# Patient Record
Sex: MALE | Race: WHITE | Employment: OTHER | URBAN - NONMETROPOLITAN AREA
[De-identification: names, ages, dates, MRNs, and addresses within clinical notes are randomized per-mention and may not be internally consistent; named-entity substitution may affect disease eponyms.]

---

## 2017-07-20 ENCOUNTER — OFFICE VISIT (OUTPATIENT)
Dept: PRIMARY CARE CLINIC | Age: 62
End: 2017-07-20
Payer: COMMERCIAL

## 2017-07-20 VITALS
BODY MASS INDEX: 23.7 KG/M2 | OXYGEN SATURATION: 97 % | WEIGHT: 175 LBS | SYSTOLIC BLOOD PRESSURE: 138 MMHG | DIASTOLIC BLOOD PRESSURE: 76 MMHG | HEIGHT: 72 IN | HEART RATE: 71 BPM | TEMPERATURE: 97.9 F

## 2017-07-20 DIAGNOSIS — L57.0 AK (ACTINIC KERATOSIS): ICD-10-CM

## 2017-07-20 DIAGNOSIS — J40 BRONCHITIS: ICD-10-CM

## 2017-07-20 DIAGNOSIS — R06.2 WHEEZING: ICD-10-CM

## 2017-07-20 DIAGNOSIS — J06.9 UPPER RESPIRATORY TRACT INFECTION, UNSPECIFIED TYPE: Primary | ICD-10-CM

## 2017-07-20 PROCEDURE — 99203 OFFICE O/P NEW LOW 30 MIN: CPT | Performed by: NURSE PRACTITIONER

## 2017-07-20 RX ORDER — CEFDINIR 300 MG/1
300 CAPSULE ORAL 2 TIMES DAILY
Qty: 20 CAPSULE | Refills: 0 | Status: SHIPPED | OUTPATIENT
Start: 2017-07-20 | End: 2017-07-30

## 2017-07-20 RX ORDER — BUTALBITAL, ACETAMINOPHEN AND CAFFEINE 50; 325; 40 MG/1; MG/1; MG/1
1 TABLET ORAL EVERY 6 HOURS PRN
Qty: 60 TABLET | Refills: 1 | Status: SHIPPED | OUTPATIENT
Start: 2017-07-20 | End: 2018-07-30 | Stop reason: SDUPTHER

## 2017-07-20 RX ORDER — METHYLPREDNISOLONE 4 MG/1
TABLET ORAL
Qty: 1 KIT | Refills: 0 | Status: SHIPPED | OUTPATIENT
Start: 2017-07-20 | End: 2018-07-30

## 2017-07-20 RX ORDER — LORATADINE 10 MG/1
10 TABLET ORAL DAILY
Qty: 30 TABLET | Refills: 5 | Status: SHIPPED | OUTPATIENT
Start: 2017-07-20 | End: 2018-07-30

## 2017-07-20 RX ORDER — OMEPRAZOLE 40 MG/1
40 CAPSULE, DELAYED RELEASE ORAL DAILY
Qty: 30 CAPSULE | Refills: 5 | Status: SHIPPED | OUTPATIENT
Start: 2017-07-20 | End: 2018-07-30 | Stop reason: SDUPTHER

## 2017-07-20 RX ORDER — ASPIRIN/CALCIUM/MAG/ALUMINUM 325 MG
325 TABLET ORAL DAILY
COMMUNITY
End: 2022-04-29

## 2017-07-20 RX ORDER — FLUTICASONE FUROATE AND VILANTEROL 200; 25 UG/1; UG/1
1 POWDER RESPIRATORY (INHALATION) DAILY
Qty: 1 EACH | Refills: 0
Start: 2017-07-20 | End: 2018-07-30

## 2017-07-20 ASSESSMENT — ENCOUNTER SYMPTOMS
SORE THROAT: 0
DIARRHEA: 0
WHEEZING: 1
EYE REDNESS: 0
EYE DISCHARGE: 0
BLOOD IN STOOL: 0
COUGH: 1
CONSTIPATION: 0
RHINORRHEA: 1
CHOKING: 0

## 2017-08-28 ENCOUNTER — HOSPITAL ENCOUNTER (EMERGENCY)
Age: 62
Discharge: HOME | End: 2017-08-28
Payer: COMMERCIAL

## 2017-08-28 VITALS
HEIGHT: 72 IN | OXYGEN SATURATION: 97 % | DIASTOLIC BLOOD PRESSURE: 99 MMHG | WEIGHT: 175 LBS | SYSTOLIC BLOOD PRESSURE: 161 MMHG | TEMPERATURE: 98.6 F | BODY MASS INDEX: 23.7 KG/M2 | HEART RATE: 88 BPM | RESPIRATION RATE: 20 BRPM

## 2017-08-28 DIAGNOSIS — S61.213A LACERATION OF LEFT MIDDLE FINGER W/O FOREIGN BODY W/O DAMAGE TO NAIL, INITIAL ENCOUNTER: Primary | ICD-10-CM

## 2017-08-28 PROCEDURE — 99282 EMERGENCY DEPT VISIT SF MDM: CPT | Performed by: NURSE PRACTITIONER

## 2017-08-28 PROCEDURE — 6360000002 HC RX W HCPCS: Performed by: NURSE PRACTITIONER

## 2017-08-28 PROCEDURE — 99282 EMERGENCY DEPT VISIT SF MDM: CPT

## 2017-08-28 PROCEDURE — 90715 TDAP VACCINE 7 YRS/> IM: CPT | Performed by: NURSE PRACTITIONER

## 2017-08-28 PROCEDURE — 90471 IMMUNIZATION ADMIN: CPT | Performed by: NURSE PRACTITIONER

## 2017-08-28 RX ORDER — CEPHALEXIN 500 MG/1
500 CAPSULE ORAL 4 TIMES DAILY
Qty: 28 CAPSULE | Refills: 0 | Status: SHIPPED | OUTPATIENT
Start: 2017-08-28 | End: 2018-07-30

## 2017-08-28 RX ADMIN — TETANUS TOXOID, REDUCED DIPHTHERIA TOXOID AND ACELLULAR PERTUSSIS VACCINE, ADSORBED 0.5 ML: 5; 2.5; 8; 8; 2.5 SUSPENSION INTRAMUSCULAR at 13:43

## 2018-07-30 ENCOUNTER — TELEPHONE (OUTPATIENT)
Dept: PRIMARY CARE CLINIC | Age: 63
End: 2018-07-30

## 2018-07-30 ENCOUNTER — OFFICE VISIT (OUTPATIENT)
Dept: PRIMARY CARE CLINIC | Age: 63
End: 2018-07-30
Payer: COMMERCIAL

## 2018-07-30 VITALS
TEMPERATURE: 97.5 F | BODY MASS INDEX: 23.16 KG/M2 | HEART RATE: 64 BPM | DIASTOLIC BLOOD PRESSURE: 88 MMHG | WEIGHT: 171 LBS | SYSTOLIC BLOOD PRESSURE: 138 MMHG | HEIGHT: 72 IN | OXYGEN SATURATION: 98 %

## 2018-07-30 DIAGNOSIS — G44.229 CHRONIC TENSION-TYPE HEADACHE, NOT INTRACTABLE: ICD-10-CM

## 2018-07-30 DIAGNOSIS — L57.0 AK (ACTINIC KERATOSIS): ICD-10-CM

## 2018-07-30 DIAGNOSIS — F51.01 PRIMARY INSOMNIA: Primary | ICD-10-CM

## 2018-07-30 DIAGNOSIS — K02.9 DENTAL CARIES: ICD-10-CM

## 2018-07-30 PROCEDURE — G8428 CUR MEDS NOT DOCUMENT: HCPCS | Performed by: NURSE PRACTITIONER

## 2018-07-30 PROCEDURE — 17003 DESTRUCT PREMALG LES 2-14: CPT | Performed by: NURSE PRACTITIONER

## 2018-07-30 PROCEDURE — 4004F PT TOBACCO SCREEN RCVD TLK: CPT | Performed by: NURSE PRACTITIONER

## 2018-07-30 PROCEDURE — 3017F COLORECTAL CA SCREEN DOC REV: CPT | Performed by: NURSE PRACTITIONER

## 2018-07-30 PROCEDURE — G8420 CALC BMI NORM PARAMETERS: HCPCS | Performed by: NURSE PRACTITIONER

## 2018-07-30 PROCEDURE — 17000 DESTRUCT PREMALG LESION: CPT | Performed by: NURSE PRACTITIONER

## 2018-07-30 PROCEDURE — 99214 OFFICE O/P EST MOD 30 MIN: CPT | Performed by: NURSE PRACTITIONER

## 2018-07-30 RX ORDER — CLONAZEPAM 1 MG/1
1 TABLET ORAL 2 TIMES DAILY PRN
Qty: 60 TABLET | Refills: 0 | Status: SHIPPED | OUTPATIENT
Start: 2018-07-30 | End: 2018-10-30 | Stop reason: SDUPTHER

## 2018-07-30 RX ORDER — PENICILLIN V POTASSIUM 500 MG/1
500 TABLET ORAL 2 TIMES DAILY
Qty: 20 TABLET | Refills: 0 | Status: SHIPPED | OUTPATIENT
Start: 2018-07-30 | End: 2019-05-24 | Stop reason: SDUPTHER

## 2018-07-30 RX ORDER — BUTALBITAL, ACETAMINOPHEN AND CAFFEINE 50; 325; 40 MG/1; MG/1; MG/1
1-2 TABLET ORAL EVERY 8 HOURS PRN
Qty: 90 TABLET | Refills: 2 | Status: SHIPPED | OUTPATIENT
Start: 2018-07-30 | End: 2018-10-30 | Stop reason: SDUPTHER

## 2018-07-30 RX ORDER — OMEPRAZOLE 40 MG/1
40 CAPSULE, DELAYED RELEASE ORAL DAILY
Qty: 30 CAPSULE | Refills: 5 | Status: SHIPPED | OUTPATIENT
Start: 2018-07-30 | End: 2018-08-31 | Stop reason: SDUPTHER

## 2018-07-30 ASSESSMENT — ENCOUNTER SYMPTOMS
DIARRHEA: 0
CHOKING: 0
EYE REDNESS: 0
COUGH: 0
ROS SKIN COMMENTS: AK
WHEEZING: 0
RHINORRHEA: 0
CONSTIPATION: 0
BLOOD IN STOOL: 0
SORE THROAT: 0
EYE DISCHARGE: 0

## 2018-07-30 NOTE — PROGRESS NOTES
tablet 0    Aspirin Buf,YuMqn-JdNvs-ThQyo, (BUFFERED ASPIRIN) 325 MG TABS Take 325 mg by mouth daily       No current facility-administered medications for this visit. No Known Allergies    Health Maintenance   Topic Date Due    Hepatitis C screen  1955    HIV screen  06/07/1970    Lipid screen  06/07/1995    Shingles Vaccine (1 of 2 - 2 Dose Series) 06/07/2005    Pneumococcal med risk (1 of 1 - PPSV23) 07/30/2019 (Originally 6/7/1974)    Colon cancer screen colonoscopy  07/30/2019 (Originally 6/7/2005)    Flu vaccine (1) 09/01/2018    DTaP/Tdap/Td vaccine (2 - Td) 08/28/2027        Subjective:      Review of Systems   Constitutional: Negative for appetite change and unexpected weight change. HENT: Positive for dental problem. Negative for congestion, ear pain, rhinorrhea and sore throat. Eyes: Negative for discharge and redness. Respiratory: Negative for cough, choking and wheezing. Cardiovascular: Negative for chest pain. Gastrointestinal: Negative for blood in stool, constipation and diarrhea. Genitourinary: Negative for decreased urine volume and dysuria. Skin: Negative for rash. AK   Neurological: Positive for headaches. Negative for weakness. Hematological: Negative for adenopathy. Psychiatric/Behavioral: Negative for suicidal ideas. Objective:     Physical Exam   Constitutional: He is oriented to person, place, and time. He appears well-developed and well-nourished. HENT:   Head: Normocephalic. Nose: Nasal deformity (septum removed, wears bandage over nose) present. Mouth/Throat: Abnormal dentition. Dental caries present. Eyes: Conjunctivae are normal.   Neck: Normal range of motion. Neck supple. Cardiovascular: Normal rate, regular rhythm and normal heart sounds. Pulmonary/Chest: Effort normal and breath sounds normal. He has no wheezes. He has no rales. Abdominal: Soft. Bowel sounds are normal. There is no tenderness.    Musculoskeletal: He

## 2018-08-31 RX ORDER — OMEPRAZOLE 40 MG/1
40 CAPSULE, DELAYED RELEASE ORAL DAILY
Qty: 30 CAPSULE | Refills: 5 | Status: SHIPPED | OUTPATIENT
Start: 2018-08-31 | End: 2019-05-24 | Stop reason: SDUPTHER

## 2018-10-30 ENCOUNTER — OFFICE VISIT (OUTPATIENT)
Dept: PRIMARY CARE CLINIC | Age: 63
End: 2018-10-30
Payer: COMMERCIAL

## 2018-10-30 VITALS
WEIGHT: 170 LBS | OXYGEN SATURATION: 97 % | BODY MASS INDEX: 23.03 KG/M2 | HEART RATE: 102 BPM | SYSTOLIC BLOOD PRESSURE: 138 MMHG | HEIGHT: 72 IN | TEMPERATURE: 98.4 F | DIASTOLIC BLOOD PRESSURE: 86 MMHG

## 2018-10-30 DIAGNOSIS — F51.01 PRIMARY INSOMNIA: ICD-10-CM

## 2018-10-30 DIAGNOSIS — I10 ESSENTIAL HYPERTENSION: ICD-10-CM

## 2018-10-30 DIAGNOSIS — G44.229 CHRONIC TENSION-TYPE HEADACHE, NOT INTRACTABLE: ICD-10-CM

## 2018-10-30 DIAGNOSIS — F41.1 GAD (GENERALIZED ANXIETY DISORDER): Primary | ICD-10-CM

## 2018-10-30 PROCEDURE — G8484 FLU IMMUNIZE NO ADMIN: HCPCS | Performed by: NURSE PRACTITIONER

## 2018-10-30 PROCEDURE — G8427 DOCREV CUR MEDS BY ELIG CLIN: HCPCS | Performed by: NURSE PRACTITIONER

## 2018-10-30 PROCEDURE — 3017F COLORECTAL CA SCREEN DOC REV: CPT | Performed by: NURSE PRACTITIONER

## 2018-10-30 PROCEDURE — 4004F PT TOBACCO SCREEN RCVD TLK: CPT | Performed by: NURSE PRACTITIONER

## 2018-10-30 PROCEDURE — 99214 OFFICE O/P EST MOD 30 MIN: CPT | Performed by: NURSE PRACTITIONER

## 2018-10-30 PROCEDURE — G8420 CALC BMI NORM PARAMETERS: HCPCS | Performed by: NURSE PRACTITIONER

## 2018-10-30 RX ORDER — CLONAZEPAM 1 MG/1
1 TABLET ORAL 2 TIMES DAILY PRN
Qty: 60 TABLET | Refills: 0 | Status: SHIPPED | OUTPATIENT
Start: 2018-10-30 | End: 2018-11-02 | Stop reason: SDUPTHER

## 2018-10-30 RX ORDER — PROPRANOLOL HCL 60 MG
60 CAPSULE, EXTENDED RELEASE 24HR ORAL DAILY
Qty: 30 CAPSULE | Refills: 5 | Status: SHIPPED | OUTPATIENT
Start: 2018-10-30 | End: 2019-05-24 | Stop reason: ALTCHOICE

## 2018-10-30 RX ORDER — BUTALBITAL, ACETAMINOPHEN AND CAFFEINE 50; 325; 40 MG/1; MG/1; MG/1
1-2 TABLET ORAL EVERY 6 HOURS PRN
Qty: 120 TABLET | Refills: 0 | Status: SHIPPED | OUTPATIENT
Start: 2018-10-30 | End: 2019-01-22 | Stop reason: SDUPTHER

## 2018-10-30 ASSESSMENT — PATIENT HEALTH QUESTIONNAIRE - PHQ9
SUM OF ALL RESPONSES TO PHQ9 QUESTIONS 1 & 2: 0
SUM OF ALL RESPONSES TO PHQ QUESTIONS 1-9: 0
2. FEELING DOWN, DEPRESSED OR HOPELESS: 0
1. LITTLE INTEREST OR PLEASURE IN DOING THINGS: 0
SUM OF ALL RESPONSES TO PHQ QUESTIONS 1-9: 0

## 2018-10-30 NOTE — PROGRESS NOTES
Refill:  5         Discussed use, benefit, and side effectsof prescribed medications. All patient questions answered. Pt voiced understanding. Reviewed health maintenance. .  Patient agreed with treatment plan. Follow up asdirected. There are no Patient Instructions on file for this visit.       Electronically signed by CAPO Garcia on11/1/2018 at 5:09 PM

## 2018-11-01 ASSESSMENT — ENCOUNTER SYMPTOMS
WHEEZING: 0
ROS SKIN COMMENTS: AK
COUGH: 0
DIARRHEA: 0
BLOOD IN STOOL: 0
RHINORRHEA: 0
EYE DISCHARGE: 0
EYE REDNESS: 0
CONSTIPATION: 0
SORE THROAT: 0
CHOKING: 0

## 2018-11-02 DIAGNOSIS — F41.1 GAD (GENERALIZED ANXIETY DISORDER): ICD-10-CM

## 2018-11-02 DIAGNOSIS — F51.01 PRIMARY INSOMNIA: ICD-10-CM

## 2018-11-02 RX ORDER — CLONAZEPAM 1 MG/1
1 TABLET ORAL 2 TIMES DAILY PRN
Qty: 60 TABLET | Refills: 0 | Status: SHIPPED | OUTPATIENT
Start: 2018-11-02 | End: 2019-01-22 | Stop reason: SDUPTHER

## 2018-11-02 NOTE — TELEPHONE ENCOUNTER
[facility] does not have Klonopin 1mg right now. Wants to [facility] instead. Called [facility] and spoke with Shiva Ferguson and cancelled rx.

## 2019-01-22 ENCOUNTER — OFFICE VISIT (OUTPATIENT)
Dept: PRIMARY CARE CLINIC | Age: 64
End: 2019-01-22
Payer: COMMERCIAL

## 2019-01-22 VITALS
HEART RATE: 67 BPM | OXYGEN SATURATION: 97 % | TEMPERATURE: 97.4 F | DIASTOLIC BLOOD PRESSURE: 84 MMHG | HEIGHT: 72 IN | BODY MASS INDEX: 22.89 KG/M2 | WEIGHT: 169 LBS | SYSTOLIC BLOOD PRESSURE: 136 MMHG

## 2019-01-22 DIAGNOSIS — Z72.0 TOBACCO ABUSE: ICD-10-CM

## 2019-01-22 DIAGNOSIS — Z85.89 HX OF SQUAMOUS CELL CARCINOMA: Primary | ICD-10-CM

## 2019-01-22 DIAGNOSIS — F51.01 PRIMARY INSOMNIA: ICD-10-CM

## 2019-01-22 DIAGNOSIS — L98.9 CHANGING SKIN LESION: ICD-10-CM

## 2019-01-22 DIAGNOSIS — F41.1 GAD (GENERALIZED ANXIETY DISORDER): ICD-10-CM

## 2019-01-22 DIAGNOSIS — G44.229 CHRONIC TENSION-TYPE HEADACHE, NOT INTRACTABLE: ICD-10-CM

## 2019-01-22 PROCEDURE — 99406 BEHAV CHNG SMOKING 3-10 MIN: CPT | Performed by: NURSE PRACTITIONER

## 2019-01-22 PROCEDURE — G8427 DOCREV CUR MEDS BY ELIG CLIN: HCPCS | Performed by: NURSE PRACTITIONER

## 2019-01-22 PROCEDURE — 3017F COLORECTAL CA SCREEN DOC REV: CPT | Performed by: NURSE PRACTITIONER

## 2019-01-22 PROCEDURE — 99214 OFFICE O/P EST MOD 30 MIN: CPT | Performed by: NURSE PRACTITIONER

## 2019-01-22 PROCEDURE — G8420 CALC BMI NORM PARAMETERS: HCPCS | Performed by: NURSE PRACTITIONER

## 2019-01-22 PROCEDURE — G8484 FLU IMMUNIZE NO ADMIN: HCPCS | Performed by: NURSE PRACTITIONER

## 2019-01-22 PROCEDURE — 4004F PT TOBACCO SCREEN RCVD TLK: CPT | Performed by: NURSE PRACTITIONER

## 2019-01-22 RX ORDER — CLONAZEPAM 1 MG/1
1 TABLET ORAL 2 TIMES DAILY PRN
Qty: 60 TABLET | Refills: 0 | Status: SHIPPED | OUTPATIENT
Start: 2019-01-22 | End: 2019-02-14 | Stop reason: SDUPTHER

## 2019-01-22 RX ORDER — BUTALBITAL, ACETAMINOPHEN AND CAFFEINE 50; 325; 40 MG/1; MG/1; MG/1
1 TABLET ORAL EVERY 6 HOURS PRN
Qty: 120 TABLET | Refills: 0 | Status: SHIPPED | OUTPATIENT
Start: 2019-01-22 | End: 2019-03-26

## 2019-01-22 ASSESSMENT — ENCOUNTER SYMPTOMS
CONSTIPATION: 0
WHEEZING: 0
BLOOD IN STOOL: 0
CHOKING: 0
COLOR CHANGE: 1
EYE DISCHARGE: 0
DIARRHEA: 0
EYE REDNESS: 0
COUGH: 0
SORE THROAT: 0
RHINORRHEA: 0

## 2019-02-14 DIAGNOSIS — F51.01 PRIMARY INSOMNIA: ICD-10-CM

## 2019-02-14 DIAGNOSIS — F41.1 GAD (GENERALIZED ANXIETY DISORDER): ICD-10-CM

## 2019-02-14 RX ORDER — CLONAZEPAM 1 MG/1
1 TABLET ORAL 2 TIMES DAILY PRN
Qty: 60 TABLET | Refills: 0 | Status: SHIPPED | OUTPATIENT
Start: 2019-02-14 | End: 2019-05-24 | Stop reason: SDUPTHER

## 2019-03-25 DIAGNOSIS — G44.229 CHRONIC TENSION-TYPE HEADACHE, NOT INTRACTABLE: ICD-10-CM

## 2019-03-26 RX ORDER — BUTALBITAL, ACETAMINOPHEN AND CAFFEINE 50; 325; 40 MG/1; MG/1; MG/1
TABLET ORAL
Qty: 120 TABLET | Refills: 0 | Status: SHIPPED | OUTPATIENT
Start: 2019-03-26 | End: 2019-03-28 | Stop reason: SDUPTHER

## 2019-03-28 DIAGNOSIS — G44.229 CHRONIC TENSION-TYPE HEADACHE, NOT INTRACTABLE: ICD-10-CM

## 2019-03-29 RX ORDER — BUTALBITAL, ACETAMINOPHEN AND CAFFEINE 50; 325; 40 MG/1; MG/1; MG/1
1 TABLET ORAL EVERY 6 HOURS PRN
Qty: 48 TABLET | Refills: 0 | Status: SHIPPED | OUTPATIENT
Start: 2019-03-29 | End: 2019-05-24 | Stop reason: SDUPTHER

## 2019-05-24 ENCOUNTER — OFFICE VISIT (OUTPATIENT)
Dept: PRIMARY CARE CLINIC | Age: 64
End: 2019-05-24
Payer: COMMERCIAL

## 2019-05-24 VITALS
BODY MASS INDEX: 22.75 KG/M2 | HEIGHT: 72 IN | HEART RATE: 62 BPM | SYSTOLIC BLOOD PRESSURE: 220 MMHG | OXYGEN SATURATION: 96 % | DIASTOLIC BLOOD PRESSURE: 102 MMHG | WEIGHT: 168 LBS | TEMPERATURE: 98 F

## 2019-05-24 DIAGNOSIS — G44.229 CHRONIC TENSION-TYPE HEADACHE, NOT INTRACTABLE: ICD-10-CM

## 2019-05-24 DIAGNOSIS — F41.1 GAD (GENERALIZED ANXIETY DISORDER): ICD-10-CM

## 2019-05-24 DIAGNOSIS — I10 ESSENTIAL HYPERTENSION: Primary | ICD-10-CM

## 2019-05-24 DIAGNOSIS — F51.01 PRIMARY INSOMNIA: ICD-10-CM

## 2019-05-24 DIAGNOSIS — J01.10 ACUTE NON-RECURRENT FRONTAL SINUSITIS: ICD-10-CM

## 2019-05-24 PROCEDURE — G8427 DOCREV CUR MEDS BY ELIG CLIN: HCPCS | Performed by: NURSE PRACTITIONER

## 2019-05-24 PROCEDURE — 99213 OFFICE O/P EST LOW 20 MIN: CPT | Performed by: NURSE PRACTITIONER

## 2019-05-24 PROCEDURE — 4004F PT TOBACCO SCREEN RCVD TLK: CPT | Performed by: NURSE PRACTITIONER

## 2019-05-24 PROCEDURE — G8420 CALC BMI NORM PARAMETERS: HCPCS | Performed by: NURSE PRACTITIONER

## 2019-05-24 PROCEDURE — 3017F COLORECTAL CA SCREEN DOC REV: CPT | Performed by: NURSE PRACTITIONER

## 2019-05-24 RX ORDER — PROPRANOLOL HYDROCHLORIDE 120 MG/1
120 CAPSULE, EXTENDED RELEASE ORAL DAILY
Qty: 30 CAPSULE | Refills: 11 | Status: SHIPPED | OUTPATIENT
Start: 2019-05-24 | End: 2020-02-21 | Stop reason: SDUPTHER

## 2019-05-24 RX ORDER — OMEPRAZOLE 40 MG/1
40 CAPSULE, DELAYED RELEASE ORAL DAILY
Qty: 30 CAPSULE | Refills: 5 | Status: SHIPPED | OUTPATIENT
Start: 2019-05-24 | End: 2019-08-22 | Stop reason: SDUPTHER

## 2019-05-24 RX ORDER — BUTALBITAL, ACETAMINOPHEN AND CAFFEINE 50; 325; 40 MG/1; MG/1; MG/1
1 TABLET ORAL EVERY 6 HOURS PRN
Qty: 48 TABLET | Refills: 0 | Status: SHIPPED | OUTPATIENT
Start: 2019-05-24 | End: 2019-08-22 | Stop reason: SDUPTHER

## 2019-05-24 RX ORDER — CLONAZEPAM 1 MG/1
1 TABLET ORAL 2 TIMES DAILY PRN
Qty: 60 TABLET | Refills: 0 | Status: SHIPPED | OUTPATIENT
Start: 2019-05-24 | End: 2019-08-22 | Stop reason: SDUPTHER

## 2019-05-24 RX ORDER — CLONIDINE HYDROCHLORIDE 0.1 MG/1
0.1 TABLET ORAL EVERY 4 HOURS PRN
Qty: 60 TABLET | Refills: 3 | Status: SHIPPED | OUTPATIENT
Start: 2019-05-24 | End: 2019-08-22 | Stop reason: SDUPTHER

## 2019-05-24 RX ORDER — PENICILLIN V POTASSIUM 500 MG/1
500 TABLET ORAL 2 TIMES DAILY
Qty: 20 TABLET | Refills: 0 | Status: SHIPPED | OUTPATIENT
Start: 2019-05-24 | End: 2019-07-22 | Stop reason: SDUPTHER

## 2019-05-24 ASSESSMENT — ENCOUNTER SYMPTOMS
SORE THROAT: 1
SHORTNESS OF BREATH: 0
BACK PAIN: 0
COUGH: 1
EYES NEGATIVE: 1
WHEEZING: 0
TROUBLE SWALLOWING: 0
ABDOMINAL PAIN: 0

## 2019-05-24 ASSESSMENT — PATIENT HEALTH QUESTIONNAIRE - PHQ9
2. FEELING DOWN, DEPRESSED OR HOPELESS: 0
1. LITTLE INTEREST OR PLEASURE IN DOING THINGS: 0
SUM OF ALL RESPONSES TO PHQ QUESTIONS 1-9: 0
SUM OF ALL RESPONSES TO PHQ QUESTIONS 1-9: 0
SUM OF ALL RESPONSES TO PHQ9 QUESTIONS 1 & 2: 0

## 2019-05-24 NOTE — PATIENT INSTRUCTIONS
Patient Education        High Blood Pressure: Care Instructions  Overview    It's normal for blood pressure to go up and down throughout the day. But if it stays up, you have high blood pressure. Another name for high blood pressure is hypertension. Despite what a lot of people think, high blood pressure usually doesn't cause headaches or make you feel dizzy or lightheaded. It usually has no symptoms. But it does increase your risk of stroke, heart attack, and other problems. You and your doctor will talk about your risks of these problems based on your blood pressure. Your doctor will give you a goal for your blood pressure. Your goal will be based on your health and your age. Lifestyle changes, such as eating healthy and being active, are always important to help lower blood pressure. You might also take medicine to reach your blood pressure goal.  Follow-up care is a key part of your treatment and safety. Be sure to make and go to all appointments, and call your doctor if you are having problems. It's also a good idea to know your test results and keep a list of the medicines you take. How can you care for yourself at home? Medical treatment  · If you stop taking your medicine, your blood pressure will go back up. You may take one or more types of medicine to lower your blood pressure. Be safe with medicines. Take your medicine exactly as prescribed. Call your doctor if you think you are having a problem with your medicine. · Talk to your doctor before you start taking aspirin every day. Aspirin can help certain people lower their risk of a heart attack or stroke. But taking aspirin isn't right for everyone, because it can cause serious bleeding. · See your doctor regularly. You may need to see the doctor more often at first or until your blood pressure comes down.   · If you are taking blood pressure medicine, talk to your doctor before you take decongestants or anti-inflammatory medicine, such as irregular heartbeat.     · You have symptoms of a stroke. These may include:  ? Sudden numbness, tingling, weakness, or loss of movement in your face, arm, or leg, especially on only one side of your body. ? Sudden vision changes. ? Sudden trouble speaking. ? Sudden confusion or trouble understanding simple statements. ? Sudden problems with walking or balance. ? A sudden, severe headache that is different from past headaches.     · You have severe back or belly pain.    Do not wait until your blood pressure comes down on its own. Get help right away.   Call your doctor now or seek immediate care if:    · Your blood pressure is much higher than normal (such as 180/120 or higher), but you don't have symptoms.     · You think high blood pressure is causing symptoms, such as:  ? Severe headache.  ? Blurry vision.    Watch closely for changes in your health, and be sure to contact your doctor if:    · Your blood pressure measures higher than your doctor recommends at least 2 times. That means the top number is higher or the bottom number is higher, or both.     · You think you may be having side effects from your blood pressure medicine. Where can you learn more? Go to https://OptiScan Biomedicalpepiceweb.[employer]. org and sign in to your [employer] account. Enter Y755 in the [employer] box to learn more about \"High Blood Pressure: Care Instructions. \"     If you do not have an account, please click on the \"Sign Up Now\" link. Current as of: July 22, 2018  Content Version: 12.0  © 4778-7033 Healthwise, Incorporated. Care instructions adapted under license by [facility] ([place], [place]) [facility] ([facility]). If you have questions about a medical condition or this instruction, always ask your healthcare professional. [place] [address] any warranty or liability for your use of this information.

## 2019-05-24 NOTE — PROGRESS NOTES
[address]  [place], [address] [place] [address] Rd  Phone [phone]   Fax [phone]      OFFICE VISIT: 2019    Komal Jacobson- : 1955      Reason For Visit:  Ernst Pryor is a 61 y.o. Thalia Burkett is here for Pharyngitis (hasnt been feeling good for a few days)         Health Maintenance       HPI    Patient is here for issues with sore throat  Reports that it was bothersome off and on  It is doing better today  But is seems to be getting better    He reports headache  Reports has been daily  Frontal headache  He has been getting it lately  He has a history of good blood pressure    He reports it comes and goes  He has started the inderal daily  Reports some days it gets better    He takes klonipin generally at bedtime  Will take 1/2-1 at night  It is helpful for sleep           height is 6' (1.829 m) and weight is 168 lb (76.2 kg). His temporal temperature is 98 °F (36.7 °C). His blood pressure is 220/102 (abnormal) and his pulse is 62. His oxygen saturation is 96%. Body mass index is 22.78 kg/m². No results found for this or any previous visit. I have reviewed the following with the Mr. Bill Raines   Lab Review   No visits with results within 6 Month(s) from this visit. Latest known visit with results is:   No results found for any previous visit. Copies of these are in the chart. Prior to Visit Medications    Medication Sig Taking? Authorizing Provider   propranolol (INDERAL LA) 120 MG extended release capsule Take 1 capsule by mouth daily Yes Rula Pro, APRN   clonazePAM (KLONOPIN) 1 MG tablet Take 1 tablet by mouth 2 times daily as needed for Anxiety for up to 30 days.  Yes Rula Pro, APRN   penicillin v potassium (VEETID) 500 MG tablet Take 1 tablet by mouth 2 times daily for 10 days Yes Rula Pro, APRN   butalbital-acetaminophen-caffeine (FIORICET, ESGIC) -40 MG per tablet Take 1 tablet by mouth every 6 hours as needed for Headaches Yes CAPO Calderón   omeprazole (PRILOSEC) 40 MG delayed release capsule Take 1 capsule by mouth daily Yes Chio CAPO Wood   Aspirin Buf,GlWqu-TuJlp-TlNat, (BUFFERED ASPIRIN) 325 MG TABS Take 325 mg by mouth daily Yes Historical Provider, MD       Allergies: Patient has no known allergies. Past Medical History:   Diagnosis Date    Cancer [facility])     Headache        Past Surgical History:   Procedure Laterality Date    NOSE SURGERY  09/2016       Social History     Tobacco Use    Smoking status: Current Every Day Smoker     Packs/day: 2.00     Years: 48.00     Pack years: 96.00     Types: Cigarettes     Start date: 7/20/1970    Smokeless tobacco: Former User   Substance Use Topics    Alcohol use: No       Review of Systems   Constitutional: Positive for activity change. Negative for fatigue and fever. HENT: Positive for congestion and sore throat. Negative for postnasal drip and trouble swallowing. Eyes: Negative. Respiratory: Positive for cough. Negative for shortness of breath and wheezing. Cardiovascular: Negative for chest pain and palpitations. Gastrointestinal: Negative for abdominal pain. Genitourinary: Negative for difficulty urinating. Musculoskeletal: Negative for arthralgias and back pain. Skin: Negative for rash. Neurological: Positive for headaches (off and on). Psychiatric/Behavioral: Negative for behavioral problems and sleep disturbance. The patient is not nervous/anxious and is not hyperactive. Physical Exam   Constitutional: He is oriented to person, place, and time. He appears well-developed and well-nourished. No distress. HENT:   Head: Normocephalic. Right Ear: External ear normal.   Left Ear: External ear normal.   Mouth/Throat: No oropharyngeal exudate. bandaid on nose     Eyes: Right eye exhibits no discharge. Left eye exhibits no discharge. Pulmonary/Chest: Effort normal and breath sounds normal. No respiratory distress. He has no wheezes.    Musculoskeletal: He exhibits no edema. Neurological: He is alert and oriented to person, place, and time. No cranial nerve deficit. Skin: Skin is warm. Capillary refill takes less than 2 seconds. No rash noted. He is not diaphoretic. Psychiatric: He has a normal mood and affect. His behavior is normal.       ASSESSMENT/ PLAN    1. Essential hypertension  Will increase  To help with headache  Daughter will check and call blood pressures   - propranolol (INDERAL LA) 120 MG extended release capsule; Take 1 capsule by mouth daily  Dispense: 30 capsule; Refill: 11    2. Chronic tension-type headache, not intractable  Reports doing well  - propranolol (INDERAL LA) 120 MG extended release capsule; Take 1 capsule by mouth daily  Dispense: 30 capsule; Refill: 11    3. Primary insomnia  Cont present  As needed stable  - clonazePAM (KLONOPIN) 1 MG tablet; Take 1 tablet by mouth 2 times daily as needed for Anxiety for up to 30 days. Dispense: 60 tablet; Refill: 0    4. ABHINAV (generalized anxiety disorder)  diong well  - clonazePAM (KLONOPIN) 1 MG tablet; Take 1 tablet by mouth 2 times daily as needed for Anxiety for up to 30 days. Dispense: 60 tablet; Refill: 0    5. Acute non-recurrent frontal sinusitis  Will do as needed  - penicillin v potassium (VEETID) 500 MG tablet; Take 1 tablet by mouth 2 times daily for 10 days  Dispense: 20 tablet; Refill: 0      No orders of the defined types were placed in this encounter. Return if symptoms worsen or fail to improve. Patient Instructions       Patient Education        High Blood Pressure: Care Instructions  Overview    It's normal for blood pressure to go up and down throughout the day. But if it stays up, you have high blood pressure. Another name for high blood pressure is hypertension. Despite what a lot of people think, high blood pressure usually doesn't cause headaches or make you feel dizzy or lightheaded. It usually has no symptoms.  But it does increase your risk of stroke, heart attack, and other problems. You and your doctor will talk about your risks of these problems based on your blood pressure. Your doctor will give you a goal for your blood pressure. Your goal will be based on your health and your age. Lifestyle changes, such as eating healthy and being active, are always important to help lower blood pressure. You might also take medicine to reach your blood pressure goal.  Follow-up care is a key part of your treatment and safety. Be sure to make and go to all appointments, and call your doctor if you are having problems. It's also a good idea to know your test results and keep a list of the medicines you take. How can you care for yourself at home? Medical treatment  · If you stop taking your medicine, your blood pressure will go back up. You may take one or more types of medicine to lower your blood pressure. Be safe with medicines. Take your medicine exactly as prescribed. Call your doctor if you think you are having a problem with your medicine. · Talk to your doctor before you start taking aspirin every day. Aspirin can help certain people lower their risk of a heart attack or stroke. But taking aspirin isn't right for everyone, because it can cause serious bleeding. · See your doctor regularly. You may need to see the doctor more often at first or until your blood pressure comes down. · If you are taking blood pressure medicine, talk to your doctor before you take decongestants or anti-inflammatory medicine, such as ibuprofen. Some of these medicines can raise blood pressure. · Learn how to check your blood pressure at home. Lifestyle changes  · Stay at a healthy weight. This is especially important if you put on weight around the waist. Losing even 10 pounds can help you lower your blood pressure. · If your doctor recommends it, get more exercise. Walking is a good choice. Bit by bit, increase the amount you walk every day.  Try for at least 30 minutes on most days of the week. You also may want to swim, bike, or do other activities. · Avoid or limit alcohol. Talk to your doctor about whether you can drink any alcohol. · Try to limit how much sodium you eat to less than 2,300 milligrams (mg) a day. Your doctor may ask you to try to eat less than 1,500 mg a day. · Eat plenty of fruits (such as bananas and oranges), vegetables, legumes, whole grains, and low-fat dairy products. · Lower the amount of saturated fat in your diet. Saturated fat is found in animal products such as milk, cheese, and meat. Limiting these foods may help you lose weight and also lower your risk for heart disease. · Do not smoke. Smoking increases your risk for heart attack and stroke. If you need help quitting, talk to your doctor about stop-smoking programs and medicines. These can increase your chances of quitting for good. When should you call for help? Call 911 anytime you think you may need emergency care. This may mean having symptoms that suggest that your blood pressure is causing a serious heart or blood vessel problem. Your blood pressure may be over 180/120.   For example, call 911 if:    · You have symptoms of a heart attack. These may include:  ? Chest pain or pressure, or a strange feeling in the chest.  ? Sweating. ? Shortness of breath. ? Nausea or vomiting. ? Pain, pressure, or a strange feeling in the back, neck, jaw, or upper belly or in one or both shoulders or arms. ? Lightheadedness or sudden weakness. ? A fast or irregular heartbeat.     · You have symptoms of a stroke. These may include:  ? Sudden numbness, tingling, weakness, or loss of movement in your face, arm, or leg, especially on only one side of your body. ? Sudden vision changes. ? Sudden trouble speaking. ? Sudden confusion or trouble understanding simple statements. ? Sudden problems with walking or balance.   ? A sudden, severe headache that is different from past headaches.     · You have severe back or belly pain.    Do not wait until your blood pressure comes down on its own. Get help right away.   Call your doctor now or seek immediate care if:    · Your blood pressure is much higher than normal (such as 180/120 or higher), but you don't have symptoms.     · You think high blood pressure is causing symptoms, such as:  ? Severe headache.  ? Blurry vision.    Watch closely for changes in your health, and be sure to contact your doctor if:    · Your blood pressure measures higher than your doctor recommends at least 2 times. That means the top number is higher or the bottom number is higher, or both.     · You think you may be having side effects from your blood pressure medicine. Where can you learn more? Go to https://WebTunerpepiceweb.[employer]. org and sign in to your [employer] account. Enter Z958 in the [employer] box to learn more about \"High Blood Pressure: Care Instructions. \"     If you do not have an account, please click on the \"Sign Up Now\" link. Current as of: July 22, 2018  Content Version: 12.0  © 1347-1788 Healthwise, Incorporated. Care instructions adapted under license by [facility] ([facility]). If you have questions about a medical condition or this instruction, always ask your healthcare professional. [place] [address] any warranty or liability for your use of this information. Controlled Substances Monitoring: Additional Instructions: As always, patient is advisedto bring in medication bottles in order to correctly reconcile with our current list.      Rencandido Kali received counseling on the following healthy behaviors: none    Patient giveneducational materials on plan of care    I have instructed Yang Bass to complete a self tracking handout on blood pressure and instructed them to bring it with them to his next appointment. Discussed use, benefit, and side effects of prescribed medications. Barriers to medication compliance addressed.   All patient questions answered. Pt voiced understanding.      Vinod Lopez, CAPO

## 2019-07-22 ENCOUNTER — OFFICE VISIT (OUTPATIENT)
Dept: PRIMARY CARE CLINIC | Age: 64
End: 2019-07-22
Payer: COMMERCIAL

## 2019-07-22 VITALS
TEMPERATURE: 97 F | HEIGHT: 67 IN | HEART RATE: 68 BPM | DIASTOLIC BLOOD PRESSURE: 92 MMHG | WEIGHT: 161.44 LBS | BODY MASS INDEX: 25.34 KG/M2 | SYSTOLIC BLOOD PRESSURE: 180 MMHG

## 2019-07-22 DIAGNOSIS — I10 ESSENTIAL HYPERTENSION: Primary | ICD-10-CM

## 2019-07-22 DIAGNOSIS — J01.10 ACUTE NON-RECURRENT FRONTAL SINUSITIS: ICD-10-CM

## 2019-07-22 PROCEDURE — 4004F PT TOBACCO SCREEN RCVD TLK: CPT | Performed by: NURSE PRACTITIONER

## 2019-07-22 PROCEDURE — 3017F COLORECTAL CA SCREEN DOC REV: CPT | Performed by: NURSE PRACTITIONER

## 2019-07-22 PROCEDURE — G8419 CALC BMI OUT NRM PARAM NOF/U: HCPCS | Performed by: NURSE PRACTITIONER

## 2019-07-22 PROCEDURE — G8427 DOCREV CUR MEDS BY ELIG CLIN: HCPCS | Performed by: NURSE PRACTITIONER

## 2019-07-22 PROCEDURE — 99213 OFFICE O/P EST LOW 20 MIN: CPT | Performed by: NURSE PRACTITIONER

## 2019-07-22 RX ORDER — PENICILLIN V POTASSIUM 500 MG/1
500 TABLET ORAL 2 TIMES DAILY
Qty: 20 TABLET | Refills: 0 | Status: SHIPPED | OUTPATIENT
Start: 2019-07-22 | End: 2019-08-01

## 2019-07-22 RX ORDER — IRBESARTAN 150 MG/1
150 TABLET ORAL DAILY
Qty: 30 TABLET | Refills: 5 | Status: SHIPPED | OUTPATIENT
Start: 2019-07-22 | End: 2019-08-22 | Stop reason: SDUPTHER

## 2019-07-22 ASSESSMENT — ENCOUNTER SYMPTOMS
SHORTNESS OF BREATH: 0
TROUBLE SWALLOWING: 0
BACK PAIN: 0
WHEEZING: 0
ABDOMINAL PAIN: 0
SORE THROAT: 0
EYES NEGATIVE: 1
COUGH: 0

## 2019-07-22 NOTE — PROGRESS NOTES
(CATAPRES) 0.1 MG tablet Take 1 tablet by mouth every 4 hours as needed for High Blood Pressure sbp great 180 or dbp great 95 Yes CAPO Dickson   Aspirin Buf,GjOsq-VsSfj-OxOjl, (BUFFERED ASPIRIN) 325 MG TABS Take 325 mg by mouth daily Yes Historical Provider, MD   clonazePAM (KLONOPIN) 1 MG tablet Take 1 tablet by mouth 2 times daily as needed for Anxiety for up to 30 days. CAPO Dickson       Allergies: Patient has no known allergies. Past Medical History:   Diagnosis Date    [facility])     Headache        Past Surgical History:   Procedure Laterality Date    NOSE SURGERY  09/2016       Social History     Tobacco Use    Smoking status: Current Every Day Smoker     Packs/day: 2.00     Years: 48.00     Pack years: 96.00     Types: Cigarettes     Start date: 7/20/1970    Smokeless tobacco: Former User   Substance Use Topics    Alcohol use: No       Review of Systems   Constitutional: Negative for activity change, fatigue and fever. HENT: Negative for congestion, postnasal drip, sore throat and trouble swallowing. Eyes: Negative. Respiratory: Negative for cough, shortness of breath and wheezing. Cardiovascular: Negative for chest pain and palpitations. Gastrointestinal: Negative for abdominal pain. Genitourinary: Negative for difficulty urinating. Musculoskeletal: Negative for arthralgias and back pain. Skin: Negative for rash. Neurological: Positive for headaches (off and on the same daily). Psychiatric/Behavioral: Negative for behavioral problems and sleep disturbance. The patient is not nervous/anxious and is not hyperactive. Physical Exam   Constitutional: He is oriented to person, place, and time. He appears well-developed and well-nourished. No distress. HENT:   Head: Normocephalic. Right Ear: External ear normal.   Left Ear: External ear normal.   Mouth/Throat: No oropharyngeal exudate.    bandaid on nose     Eyes: Right eye exhibits no

## 2019-08-13 ENCOUNTER — TELEPHONE (OUTPATIENT)
Dept: PRIMARY CARE CLINIC | Age: 64
End: 2019-08-13

## 2019-08-22 ENCOUNTER — OFFICE VISIT (OUTPATIENT)
Dept: PRIMARY CARE CLINIC | Age: 64
End: 2019-08-22
Payer: COMMERCIAL

## 2019-08-22 VITALS
WEIGHT: 167 LBS | DIASTOLIC BLOOD PRESSURE: 86 MMHG | BODY MASS INDEX: 26.21 KG/M2 | OXYGEN SATURATION: 98 % | HEIGHT: 67 IN | TEMPERATURE: 97.5 F | HEART RATE: 77 BPM | SYSTOLIC BLOOD PRESSURE: 132 MMHG

## 2019-08-22 DIAGNOSIS — F41.1 GAD (GENERALIZED ANXIETY DISORDER): ICD-10-CM

## 2019-08-22 DIAGNOSIS — I10 ESSENTIAL HYPERTENSION: ICD-10-CM

## 2019-08-22 DIAGNOSIS — G44.229 CHRONIC TENSION-TYPE HEADACHE, NOT INTRACTABLE: ICD-10-CM

## 2019-08-22 DIAGNOSIS — F51.01 PRIMARY INSOMNIA: ICD-10-CM

## 2019-08-22 PROCEDURE — G8427 DOCREV CUR MEDS BY ELIG CLIN: HCPCS | Performed by: NURSE PRACTITIONER

## 2019-08-22 PROCEDURE — G8419 CALC BMI OUT NRM PARAM NOF/U: HCPCS | Performed by: NURSE PRACTITIONER

## 2019-08-22 PROCEDURE — 4004F PT TOBACCO SCREEN RCVD TLK: CPT | Performed by: NURSE PRACTITIONER

## 2019-08-22 PROCEDURE — 3017F COLORECTAL CA SCREEN DOC REV: CPT | Performed by: NURSE PRACTITIONER

## 2019-08-22 PROCEDURE — 99214 OFFICE O/P EST MOD 30 MIN: CPT | Performed by: NURSE PRACTITIONER

## 2019-08-22 RX ORDER — CLONIDINE HYDROCHLORIDE 0.1 MG/1
0.1 TABLET ORAL EVERY 4 HOURS PRN
Qty: 60 TABLET | Refills: 3 | Status: SHIPPED | OUTPATIENT
Start: 2019-08-22 | End: 2020-02-21 | Stop reason: SDUPTHER

## 2019-08-22 RX ORDER — OMEPRAZOLE 40 MG/1
40 CAPSULE, DELAYED RELEASE ORAL DAILY
Qty: 30 CAPSULE | Refills: 5 | Status: SHIPPED | OUTPATIENT
Start: 2019-08-22 | End: 2020-02-21 | Stop reason: SDUPTHER

## 2019-08-22 RX ORDER — IRBESARTAN 300 MG/1
300 TABLET ORAL DAILY
Qty: 30 TABLET | Refills: 5 | Status: SHIPPED | OUTPATIENT
Start: 2019-08-22 | End: 2020-02-21 | Stop reason: SDUPTHER

## 2019-08-22 RX ORDER — CLONAZEPAM 1 MG/1
1 TABLET ORAL 2 TIMES DAILY PRN
Qty: 60 TABLET | Refills: 0 | Status: SHIPPED | OUTPATIENT
Start: 2019-08-22 | End: 2019-11-26 | Stop reason: SDUPTHER

## 2019-08-22 RX ORDER — BUTALBITAL, ACETAMINOPHEN AND CAFFEINE 50; 325; 40 MG/1; MG/1; MG/1
1 TABLET ORAL EVERY 6 HOURS PRN
Qty: 48 TABLET | Refills: 0 | Status: SHIPPED | OUTPATIENT
Start: 2019-08-22 | End: 2019-11-26 | Stop reason: SDUPTHER

## 2019-08-22 ASSESSMENT — ENCOUNTER SYMPTOMS
EYE REDNESS: 0
COUGH: 0
ROS SKIN COMMENTS: AK
BLOOD IN STOOL: 0
CHOKING: 0
DIARRHEA: 0
SORE THROAT: 0
SHORTNESS OF BREATH: 0
WHEEZING: 0
CONSTIPATION: 0
RHINORRHEA: 0
EYE DISCHARGE: 0

## 2019-08-22 NOTE — PROGRESS NOTES
1041 EKG completed. Results given to ROSENDA Blanco RN   tablet by mouth 2 times daily as needed for Anxiety for up to 30 days. Dispense:  60 tablet     Refill:  0    irbesartan (AVAPRO) 300 MG tablet     Sig: Take 1 tablet by mouth daily     Dispense:  30 tablet     Refill:  5    omeprazole (PRILOSEC) 40 MG delayed release capsule     Sig: Take 1 capsule by mouth daily     Dispense:  30 capsule     Refill:  5    butalbital-acetaminophen-caffeine (FIORICET, ESGIC) -40 MG per tablet     Sig: Take 1 tablet by mouth every 6 hours as needed for Headaches     Dispense:  48 tablet     Refill:  0    cloNIDine (CATAPRES) 0.1 MG tablet     Sig: Take 1 tablet by mouth every 4 hours as needed for High Blood Pressure sbp great 180 or dbp great 95     Dispense:  60 tablet     Refill:  3         Discussed use, benefit, and side effectsof prescribed medications. All patient questions answered. Pt voiced understanding. Reviewed health maintenance. .  Patient agreed with treatment plan. Follow up asdirected. There are no Patient Instructions on file for this visit.       Electronically signed by CAPO Godwin on8/22/2019 at 2:29 PM

## 2019-09-20 ENCOUNTER — TELEPHONE (OUTPATIENT)
Dept: PRIMARY CARE CLINIC | Age: 64
End: 2019-09-20

## 2019-10-07 ENCOUNTER — TELEPHONE (OUTPATIENT)
Dept: PRIMARY CARE CLINIC | Age: 64
End: 2019-10-07

## 2019-10-07 RX ORDER — AMLODIPINE BESYLATE 5 MG/1
5 TABLET ORAL DAILY
Qty: 30 TABLET | Refills: 5 | Status: SHIPPED | OUTPATIENT
Start: 2019-10-07 | End: 2019-11-26 | Stop reason: SDUPTHER

## 2019-11-26 ENCOUNTER — OFFICE VISIT (OUTPATIENT)
Dept: PRIMARY CARE CLINIC | Age: 64
End: 2019-11-26
Payer: COMMERCIAL

## 2019-11-26 VITALS
BODY MASS INDEX: 22.35 KG/M2 | DIASTOLIC BLOOD PRESSURE: 88 MMHG | OXYGEN SATURATION: 91 % | SYSTOLIC BLOOD PRESSURE: 138 MMHG | WEIGHT: 165 LBS | TEMPERATURE: 96.4 F | HEIGHT: 72 IN | HEART RATE: 56 BPM

## 2019-11-26 DIAGNOSIS — I10 ESSENTIAL HYPERTENSION: Primary | ICD-10-CM

## 2019-11-26 DIAGNOSIS — G44.229 CHRONIC TENSION-TYPE HEADACHE, NOT INTRACTABLE: ICD-10-CM

## 2019-11-26 DIAGNOSIS — F51.01 PRIMARY INSOMNIA: ICD-10-CM

## 2019-11-26 DIAGNOSIS — F41.1 GAD (GENERALIZED ANXIETY DISORDER): ICD-10-CM

## 2019-11-26 PROCEDURE — 3017F COLORECTAL CA SCREEN DOC REV: CPT | Performed by: NURSE PRACTITIONER

## 2019-11-26 PROCEDURE — G8484 FLU IMMUNIZE NO ADMIN: HCPCS | Performed by: NURSE PRACTITIONER

## 2019-11-26 PROCEDURE — G8420 CALC BMI NORM PARAMETERS: HCPCS | Performed by: NURSE PRACTITIONER

## 2019-11-26 PROCEDURE — 99214 OFFICE O/P EST MOD 30 MIN: CPT | Performed by: NURSE PRACTITIONER

## 2019-11-26 PROCEDURE — 4004F PT TOBACCO SCREEN RCVD TLK: CPT | Performed by: NURSE PRACTITIONER

## 2019-11-26 PROCEDURE — G8428 CUR MEDS NOT DOCUMENT: HCPCS | Performed by: NURSE PRACTITIONER

## 2019-11-26 RX ORDER — AMLODIPINE BESYLATE 10 MG/1
10 TABLET ORAL DAILY
Qty: 30 TABLET | Refills: 5 | Status: SHIPPED | OUTPATIENT
Start: 2019-11-26 | End: 2020-02-21 | Stop reason: SDUPTHER

## 2019-11-26 RX ORDER — BUTALBITAL, ACETAMINOPHEN AND CAFFEINE 50; 325; 40 MG/1; MG/1; MG/1
1 TABLET ORAL EVERY 6 HOURS PRN
Qty: 48 TABLET | Refills: 0 | Status: SHIPPED | OUTPATIENT
Start: 2019-11-26 | End: 2020-01-16 | Stop reason: SDUPTHER

## 2019-11-26 RX ORDER — CLONAZEPAM 1 MG/1
1 TABLET ORAL 3 TIMES DAILY PRN
Qty: 90 TABLET | Refills: 0 | Status: SHIPPED | OUTPATIENT
Start: 2019-11-26 | End: 2020-02-21 | Stop reason: SDUPTHER

## 2019-11-26 RX ORDER — AMITRIPTYLINE HYDROCHLORIDE 25 MG/1
25 TABLET, FILM COATED ORAL NIGHTLY
Qty: 30 TABLET | Refills: 5 | Status: SHIPPED | OUTPATIENT
Start: 2019-11-26 | End: 2020-02-21 | Stop reason: SDUPTHER

## 2019-11-26 ASSESSMENT — ENCOUNTER SYMPTOMS
COUGH: 0
CHOKING: 0
SORE THROAT: 0
ROS SKIN COMMENTS: AK
SHORTNESS OF BREATH: 0
DIARRHEA: 0
BLOOD IN STOOL: 0
EYE REDNESS: 0
WHEEZING: 0
RHINORRHEA: 0
CONSTIPATION: 0
EYE DISCHARGE: 0

## 2019-12-03 ENCOUNTER — TELEPHONE (OUTPATIENT)
Dept: PRIMARY CARE CLINIC | Age: 64
End: 2019-12-03

## 2020-01-16 RX ORDER — BUTALBITAL, ACETAMINOPHEN AND CAFFEINE 50; 325; 40 MG/1; MG/1; MG/1
1 TABLET ORAL EVERY 6 HOURS PRN
Qty: 48 TABLET | Refills: 0 | Status: SHIPPED | OUTPATIENT
Start: 2020-01-16 | End: 2020-02-21 | Stop reason: SDUPTHER

## 2020-01-16 NOTE — TELEPHONE ENCOUNTER
Received fax from pharmacy requesting refill on pts medication(s). Pt was last seen in office on 11/26/2019  and has a follow up scheduled for Visit date not found. Will send request to  [facility]  for patient.      Requested Prescriptions     Pending Prescriptions Disp Refills    butalbital-acetaminophen-caffeine (FIORICET, ESGIC) -40 MG per tablet 48 tablet 0     Sig: Take 1 tablet by mouth every 6 hours as needed for Headaches yes

## 2020-02-21 ENCOUNTER — TELEPHONE (OUTPATIENT)
Dept: PRIMARY CARE CLINIC | Age: 65
End: 2020-02-21

## 2020-02-21 ENCOUNTER — OFFICE VISIT (OUTPATIENT)
Dept: PRIMARY CARE CLINIC | Age: 65
End: 2020-02-21
Payer: MEDICAID

## 2020-02-21 VITALS
TEMPERATURE: 97.3 F | OXYGEN SATURATION: 94 % | SYSTOLIC BLOOD PRESSURE: 128 MMHG | HEART RATE: 78 BPM | DIASTOLIC BLOOD PRESSURE: 82 MMHG | BODY MASS INDEX: 22.67 KG/M2 | HEIGHT: 72 IN | WEIGHT: 167.4 LBS

## 2020-02-21 PROCEDURE — 99213 OFFICE O/P EST LOW 20 MIN: CPT | Performed by: NURSE PRACTITIONER

## 2020-02-21 RX ORDER — AMITRIPTYLINE HYDROCHLORIDE 25 MG/1
25 TABLET, FILM COATED ORAL NIGHTLY
Qty: 30 TABLET | Refills: 11 | Status: SHIPPED | OUTPATIENT
Start: 2020-02-21 | End: 2022-05-11

## 2020-02-21 RX ORDER — PROPRANOLOL HYDROCHLORIDE 120 MG/1
120 CAPSULE, EXTENDED RELEASE ORAL DAILY
Qty: 30 CAPSULE | Refills: 11 | Status: SHIPPED | OUTPATIENT
Start: 2020-02-21 | End: 2021-10-29

## 2020-02-21 RX ORDER — OMEPRAZOLE 40 MG/1
40 CAPSULE, DELAYED RELEASE ORAL DAILY
Qty: 30 CAPSULE | Refills: 11 | Status: SHIPPED | OUTPATIENT
Start: 2020-02-21 | End: 2021-03-22 | Stop reason: SDUPTHER

## 2020-02-21 RX ORDER — METHYLPREDNISOLONE 4 MG/1
TABLET ORAL
Qty: 1 KIT | Refills: 0 | Status: SHIPPED | OUTPATIENT
Start: 2020-02-21 | End: 2021-10-05

## 2020-02-21 RX ORDER — IRBESARTAN 300 MG/1
300 TABLET ORAL DAILY
Qty: 30 TABLET | Refills: 11 | Status: SHIPPED | OUTPATIENT
Start: 2020-02-21 | End: 2021-02-17 | Stop reason: SDUPTHER

## 2020-02-21 RX ORDER — CLONIDINE HYDROCHLORIDE 0.1 MG/1
0.1 TABLET ORAL EVERY 4 HOURS PRN
Qty: 60 TABLET | Refills: 11 | Status: SHIPPED | OUTPATIENT
Start: 2020-02-21 | End: 2021-10-29

## 2020-02-21 RX ORDER — BUTALBITAL, ACETAMINOPHEN AND CAFFEINE 50; 325; 40 MG/1; MG/1; MG/1
1 TABLET ORAL EVERY 6 HOURS PRN
Qty: 48 TABLET | Refills: 0 | Status: SHIPPED | OUTPATIENT
Start: 2020-02-21 | End: 2020-04-16 | Stop reason: SDUPTHER

## 2020-02-21 RX ORDER — AMLODIPINE BESYLATE 10 MG/1
10 TABLET ORAL DAILY
Qty: 30 TABLET | Refills: 11 | Status: SHIPPED | OUTPATIENT
Start: 2020-02-21 | End: 2021-02-03 | Stop reason: SDUPTHER

## 2020-02-21 RX ORDER — HYDROCODONE BITARTRATE AND ACETAMINOPHEN 7.5; 325 MG/1; MG/1
1 TABLET ORAL EVERY 6 HOURS PRN
Qty: 12 TABLET | Refills: 0 | Status: SHIPPED | OUTPATIENT
Start: 2020-02-21 | End: 2020-02-24

## 2020-02-21 RX ORDER — CLONAZEPAM 1 MG/1
1 TABLET ORAL 3 TIMES DAILY PRN
Qty: 90 TABLET | Refills: 0 | Status: SHIPPED | OUTPATIENT
Start: 2020-02-21 | End: 2020-05-22 | Stop reason: SDUPTHER

## 2020-02-21 ASSESSMENT — PATIENT HEALTH QUESTIONNAIRE - PHQ9
1. LITTLE INTEREST OR PLEASURE IN DOING THINGS: 0
2. FEELING DOWN, DEPRESSED OR HOPELESS: 0
SUM OF ALL RESPONSES TO PHQ9 QUESTIONS 1 & 2: 0
SUM OF ALL RESPONSES TO PHQ QUESTIONS 1-9: 0
SUM OF ALL RESPONSES TO PHQ QUESTIONS 1-9: 0

## 2020-02-21 ASSESSMENT — ENCOUNTER SYMPTOMS
SORE THROAT: 0
BLOOD IN STOOL: 0
SHORTNESS OF BREATH: 0
CONSTIPATION: 0
DIARRHEA: 0
EYE DISCHARGE: 0
BACK PAIN: 1
WHEEZING: 0
EYE REDNESS: 0
COUGH: 0
ROS SKIN COMMENTS: AK
RHINORRHEA: 0
CHOKING: 0

## 2020-02-21 NOTE — TELEPHONE ENCOUNTER
Pharmacy calls and leaves a message saying that they need more detailed Dx than low back pain for his Hydrocodone.

## 2020-02-21 NOTE — PROGRESS NOTES
[address]  [place], [address] [place] [address] Rd  Phone [phone]   Fax [phone]      OFFICE VISIT: 2/21/2020    Jarred Lyon is a 59 y.o. male whopresents today for his medical conditions/complaints as noted below. Jarred Lyon isc/o of Check-Up and Medication Refill        HPI:      HPI  Hypertension   This is a chronic problem. The current episode started more than 1 year ago. The problem has been gradually improving since onset. The problem is uncontrolled. Associated symptoms include headaches. Pertinent negatives include no chest pain, palpitations, peripheral edema or shortness of breath. There are no associated agents to hypertension. The current treatment provides moderate improvement. There are no compliance problems. There is no history of angina. BP has been improving. Still running too high but much better. He brought in his log.         Headaches  fiorecet helps. He will have somedays and not have a HA and then some days it will hurt all day. Denies imaging and referral to neurology     HM  Declines all     Insomnia. This has improved with the klonopin. He is taking it at night. It is working to help him sleep   He is needing a refill on this medication. Back pain  Has been hurting for about 2 weeks. This is a chronic ongoing problem. Reports that some days are better than others some times it does not hurt and just has flareups from time to time. Denies any paresthesias or loss of bowel or bladder control. No sciatica    Past Medical History:   Diagnosis Date    Cancer [facility])     Headache       Past Surgical History:   Procedure Laterality Date    NOSE SURGERY  09/2016       No family history on file.     Social History     Tobacco Use    Smoking status: Current Every Day Smoker     Packs/day: 2.00     Years: 48.00     Pack years: 96.00     Types: Cigarettes     Start date: 7/20/1970    Smokeless tobacco: Former User   Substance Use Topics    Alcohol use: No      Current vaccine  Aged Out    Hepatitis B vaccine  Aged Out    Hib vaccine  Aged Out    Meningococcal (ACWY) vaccine  Aged Out        Subjective:     Review of Systems   Constitutional: Negative for appetite change and unexpected weight change. HENT: Negative for congestion, ear pain, rhinorrhea and sore throat. Eyes: Negative for discharge and redness. Respiratory: Negative for cough, choking, shortness of breath and wheezing. Cardiovascular: Negative for chest pain and palpitations. Gastrointestinal: Negative for blood in stool, constipation and diarrhea. Genitourinary: Negative for decreased urine volume and dysuria. Musculoskeletal: Positive for back pain. Skin: Negative for rash. AK   Neurological: Positive for headaches. Negative for weakness. Hematological: Negative for adenopathy. Psychiatric/Behavioral: Negative for suicidal ideas. Objective:      Physical Exam  Vitals signs reviewed. Constitutional:       Appearance: He is well-developed. HENT:      Head: Normocephalic. Nose: Nasal deformity (septum removed, wears bandage over nose) present. Mouth/Throat:      Dentition: Abnormal dentition. Dental caries present. Eyes:      Conjunctiva/sclera: Conjunctivae normal.   Neck:      Musculoskeletal: Normal range of motion and neck supple. Cardiovascular:      Rate and Rhythm: Normal rate and regular rhythm. Heart sounds: Normal heart sounds. Pulmonary:      Effort: Pulmonary effort is normal.      Breath sounds: Normal breath sounds. No wheezing or rales. Abdominal:      General: Bowel sounds are normal.      Palpations: Abdomen is soft. Tenderness: There is no abdominal tenderness. Musculoskeletal:      Lumbar back: He exhibits pain. Skin:     General: Skin is warm and dry. Comments: Multiple AK on arms, hands,ears, and face   Neurological:      Mental Status: He is alert and oriented to person, place, and time.    Psychiatric: Behavior: Behavior normal.       /82 (Site: Left Upper Arm, Position: Sitting, Cuff Size: Large Adult)   Pulse 78   Temp 97.3 °F (36.3 °C) (Temporal)   Ht 6' (1.829 m)   Wt 167 lb 6.4 oz (75.9 kg)   SpO2 94%   BMI 22.70 kg/m²     Assessment:           ICD-10-CM    1. Acute midline low back pain without sciatica M54.5 HYDROcodone-acetaminophen (NORCO) 7.5-325 MG per tablet   2. Primary insomnia F51.01 clonazePAM (KLONOPIN) 1 MG tablet   3. ABHINAV (generalized anxiety disorder) F41.1 clonazePAM (KLONOPIN) 1 MG tablet   4. Chronic tension-type headache, not intractable G44.229 butalbital-acetaminophen-caffeine (FIORICET, ESGIC) -40 MG per tablet     amitriptyline (ELAVIL) 25 MG tablet     propranolol (INDERAL LA) 120 MG extended release capsule   5. Essential hypertension I10 amLODIPine (NORVASC) 10 MG tablet     irbesartan (AVAPRO) 300 MG tablet     cloNIDine (CATAPRES) 0.1 MG tablet     propranolol (INDERAL LA) 120 MG extended release capsule       Plan:      Controlled Substance Monitoring:    Acute and Chronic Pain Monitoring:   RX Monitoring 2/21/2020   Attestation The Prescription Monitoring Report for this patient was reviewed today. Periodic Controlled Substance Monitoring Possible medication side effects, risk of tolerance/dependence & alternative treatments discussed. ;No signs of potential drug abuse or diversion identified. Chronic Pain > 80 MEDD -         Return in about 3 months (around 5/21/2020). No orders of the defined types were placed in this encounter. Orders Placed This Encounter   Medications    clonazePAM (KLONOPIN) 1 MG tablet     Sig: Take 1 tablet by mouth 3 times daily as needed for Anxiety for up to 30 days.      Dispense:  90 tablet     Refill:  0    butalbital-acetaminophen-caffeine (FIORICET, ESGIC) -40 MG per tablet     Sig: Take 1 tablet by mouth every 6 hours as needed for Headaches     Dispense:  48 tablet     Refill:  0    amLODIPine (NORVASC) 10 MG tablet     Sig: Take 1 tablet by mouth daily     Dispense:  30 tablet     Refill:  11    amitriptyline (ELAVIL) 25 MG tablet     Sig: Take 1 tablet by mouth nightly For headache prevention and sleep     Dispense:  30 tablet     Refill:  11    irbesartan (AVAPRO) 300 MG tablet     Sig: Take 1 tablet by mouth daily     Dispense:  30 tablet     Refill:  11    omeprazole (PRILOSEC) 40 MG delayed release capsule     Sig: Take 1 capsule by mouth daily     Dispense:  30 capsule     Refill:  11    cloNIDine (CATAPRES) 0.1 MG tablet     Sig: Take 1 tablet by mouth every 4 hours as needed for High Blood Pressure sbp great 180 or dbp great 95     Dispense:  60 tablet     Refill:  11    propranolol (INDERAL LA) 120 MG extended release capsule     Sig: Take 1 capsule by mouth daily     Dispense:  30 capsule     Refill:  11    HYDROcodone-acetaminophen (NORCO) 7.5-325 MG per tablet     Sig: Take 1 tablet by mouth every 6 hours as needed for Pain for up to 3 days. Intended supply: 3 days. Take lowest dose possible to manage pain     Dispense:  12 tablet     Refill:  0     Reduce doses taken as pain becomes manageable    methylPREDNISolone (MEDROL, BEVERLY,) 4 MG tablet     Sig: Take p.o. as directed     Dispense:  1 kit     Refill:  0         Discussed use, benefit, and side effectsof prescribed medications. All patient questions answered. Pt voiced understanding. Reviewed health maintenance. .  Patient agreed with treatment plan. Follow up asdirected. There are no Patient Instructions on file for this visit.       Electronically signed by CAPO Goldsmith on2/21/2020 at 12:53 PM

## 2020-03-03 ENCOUNTER — TELEPHONE (OUTPATIENT)
Dept: PRIMARY CARE CLINIC | Age: 65
End: 2020-03-03

## 2020-03-05 NOTE — TELEPHONE ENCOUNTER
Called and spoke with daughter. She will call back after speaking with him to make him a follow up appt.

## 2020-04-16 RX ORDER — BUTALBITAL, ACETAMINOPHEN AND CAFFEINE 50; 325; 40 MG/1; MG/1; MG/1
1 TABLET ORAL EVERY 6 HOURS PRN
Qty: 48 TABLET | Refills: 0 | Status: SHIPPED | OUTPATIENT
Start: 2020-04-16 | End: 2020-05-22 | Stop reason: SDUPTHER

## 2020-05-22 ENCOUNTER — OFFICE VISIT (OUTPATIENT)
Dept: PRIMARY CARE CLINIC | Age: 65
End: 2020-05-22
Payer: MEDICAID

## 2020-05-22 PROCEDURE — 99443 PR PHYS/QHP TELEPHONE EVALUATION 21-30 MIN: CPT | Performed by: NURSE PRACTITIONER

## 2020-05-22 RX ORDER — CLONAZEPAM 1 MG/1
1 TABLET ORAL 3 TIMES DAILY PRN
Qty: 90 TABLET | Refills: 0 | Status: SHIPPED | OUTPATIENT
Start: 2020-05-22 | End: 2020-09-03 | Stop reason: SDUPTHER

## 2020-05-22 RX ORDER — BUTALBITAL, ACETAMINOPHEN AND CAFFEINE 50; 325; 40 MG/1; MG/1; MG/1
1 TABLET ORAL EVERY 6 HOURS PRN
Qty: 48 TABLET | Refills: 0 | Status: SHIPPED | OUTPATIENT
Start: 2020-05-22 | End: 2020-07-16 | Stop reason: SDUPTHER

## 2020-05-22 ASSESSMENT — ENCOUNTER SYMPTOMS
EYE REDNESS: 0
DIARRHEA: 0
WHEEZING: 0
RHINORRHEA: 0
COUGH: 0
BACK PAIN: 1
CHOKING: 0
SORE THROAT: 0
BLOOD IN STOOL: 0
EYE DISCHARGE: 0
SHORTNESS OF BREATH: 0
CONSTIPATION: 0

## 2020-05-22 NOTE — PROGRESS NOTES
[address], [address]  Phone [phone]   Fax [phone]      telephone VISIT: 5/22/2020    Cammy Zhao is a 59 y.o. male who presents today for his medical conditions/complaints as noted below. Cammy Zhao isc/o of Insomnia and Headache        :     HPI  Headaches  fiorecet helps. He will have somedays and not have a HA and then some days it will hurt all day. Denies imaging and referral to neurology    Insomnia. This has improved with the klonopin. He is taking it at night. It is working to help him sleep   He is needing a refill on this medication.     Hypertension   This is a chronic problem. The current episode started more than 1 year ago. The problem has been gradually improving since onset. The problem is uncontrolled. Associated symptoms include headaches. Pertinent negatives include no chest pain, palpitations, peripheral edema or shortness of breath. There are no associated agents to hypertension. The current treatment provides moderate improvement. There are no compliance problems. Julio Brown is no history of angina.   BP has been improving. The highest it has been in 138/ 88 otherwise it has been a lot lower. Past Medical History:   Diagnosis Date    Cancer [facility])     Headache       Past Surgical History:   Procedure Laterality Date    NOSE SURGERY  09/2016       No family history on file. Social History     Tobacco Use    Smoking status: Current Every Day Smoker     Packs/day: 2.00     Years: 48.00     Pack years: 96.00     Types: Cigarettes     Start date: 7/20/1970    Smokeless tobacco: Former User   Substance Use Topics    Alcohol use: No      Current Outpatient Medications   Medication Sig Dispense Refill    clonazePAM (KLONOPIN) 1 MG tablet Take 1 tablet by mouth 3 times daily as needed for Anxiety for up to 30 days.  90 tablet 0    butalbital-acetaminophen-caffeine (FIORICET, ESGIC) -40 MG per tablet Take 1 tablet by mouth every 6 hours as Negative for blood in stool, constipation and diarrhea. Genitourinary: Negative for decreased urine volume and dysuria. Musculoskeletal: Positive for back pain. Skin: Negative for rash. Neurological: Positive for headaches. Negative for weakness. Hematological: Negative for adenopathy. Psychiatric/Behavioral: Negative for suicidal ideas.       :     Physical Exam  Neurological:      Mental Status: He is alert and oriented to person, place, and time. Psychiatric:         Mood and Affect: Mood normal.     telephone encounter  There were no vitals taken for this visit.    :        ICD-10-CM    1. Essential hypertension I10    2. Primary insomnia F51.01 clonazePAM (KLONOPIN) 1 MG tablet   3. ABHINAV (generalized anxiety disorder) F41.1 clonazePAM (KLONOPIN) 1 MG tablet   4. Chronic tension-type headache, not intractable G44.229 butalbital-acetaminophen-caffeine (FIORICET, ESGIC) -40 MG per tablet       :    Due to patients co-morbidities and risk for potential exposure of COVID 19 pandemic, Telehealth was initiated. Verbal consent was given to conduct a teleheath visit. Provider performed history of present illness and review of systems. Diagnosis and treatment plan was discussed with patient. Pharmacy of choice was reviewed along with past medical history, medication allergies, and current medications. Education provided to patient or patient parents/guardian with current illness diagnosis as well as when to seek additional healthcare due to changing or for worsening symptoms. Patient voiced understanding. Services were provided through a telephone encounter to substitute for in-person clinic visit. Patient was located at their individual home and provider was located at the office of [facility]. Patient identification was verified at the start of the visit: Yes    Total time spent on this encounter: 22 min    Return in about 3 months (around 8/22/2020).     No orders of the

## 2020-07-16 RX ORDER — BUTALBITAL, ACETAMINOPHEN AND CAFFEINE 50; 325; 40 MG/1; MG/1; MG/1
1 TABLET ORAL EVERY 6 HOURS PRN
Qty: 48 TABLET | Refills: 0 | Status: SHIPPED | OUTPATIENT
Start: 2020-07-16 | End: 2020-09-03 | Stop reason: SDUPTHER

## 2020-07-16 NOTE — TELEPHONE ENCOUNTER
Received fax from pharmacy requesting refill on pts medication(s). Pt was last seen in office on 5/22/2020  and has a follow up scheduled for Visit date not found. Will send request to  Andra Rosario  for patient.      Requested Prescriptions     Pending Prescriptions Disp Refills    butalbital-acetaminophen-caffeine (FIORICET, ESGIC) -40 MG per tablet 48 tablet 0     Sig: Take 1 tablet by mouth every 6 hours as needed for Headaches

## 2020-09-03 ENCOUNTER — VIRTUAL VISIT (OUTPATIENT)
Dept: PRIMARY CARE CLINIC | Age: 65
End: 2020-09-03
Payer: MEDICAID

## 2020-09-03 PROCEDURE — 99214 OFFICE O/P EST MOD 30 MIN: CPT | Performed by: NURSE PRACTITIONER

## 2020-09-03 RX ORDER — CLONAZEPAM 1 MG/1
1 TABLET ORAL 3 TIMES DAILY PRN
Qty: 90 TABLET | Refills: 0 | Status: SHIPPED | OUTPATIENT
Start: 2020-09-03 | End: 2020-12-03 | Stop reason: SDUPTHER

## 2020-09-03 RX ORDER — BUTALBITAL, ACETAMINOPHEN AND CAFFEINE 50; 325; 40 MG/1; MG/1; MG/1
1 TABLET ORAL EVERY 6 HOURS PRN
Qty: 120 TABLET | Refills: 0 | Status: SHIPPED | OUTPATIENT
Start: 2020-09-03 | End: 2020-12-03 | Stop reason: SDUPTHER

## 2020-09-03 ASSESSMENT — ENCOUNTER SYMPTOMS
WHEEZING: 0
SORE THROAT: 0
ROS SKIN COMMENTS: AK
DIARRHEA: 0
EYE REDNESS: 0
SHORTNESS OF BREATH: 0
CHOKING: 0
EYE DISCHARGE: 0
RHINORRHEA: 0
COUGH: 0
CONSTIPATION: 0
BLOOD IN STOOL: 0

## 2020-09-03 NOTE — PROGRESS NOTES
Medication Sig Taking? Authorizing Provider   clonazePAM (KLONOPIN) 1 MG tablet Take 1 tablet by mouth 3 times daily as needed for Anxiety for up to 30 days.  Yes CAPO Charles   butalbital-acetaminophen-caffeine (FIORICET, ESGIC) -40 MG per tablet Take 1 tablet by mouth every 6 hours as needed for Headaches Yes CAPO Gutierrez   amLODIPine (NORVASC) 10 MG tablet Take 1 tablet by mouth daily  CAPO Gutierrez   amitriptyline (ELAVIL) 25 MG tablet Take 1 tablet by mouth nightly For headache prevention and sleep  CAPO Gutierrez   irbesartan (AVAPRO) 300 MG tablet Take 1 tablet by mouth daily  CAPO Gutierrez   omeprazole (PRILOSEC) 40 MG delayed release capsule Take 1 capsule by mouth daily  CAPO Gutierrez   cloNIDine (CATAPRES) 0.1 MG tablet Take 1 tablet by mouth every 4 hours as needed for High Blood Pressure sbp great 180 or dbp great 95  CAPO Gutierrez   propranolol (INDERAL LA) 120 MG extended release capsule Take 1 capsule by mouth daily  CAPO Gutierrez   methylPREDNISolone (MEDROL, BEVERLY,) 4 MG tablet Take p.o. as directed  CAPO Mendez   Aspirin Buf,LjIsd-AlFet-CwYpz, (BUFFERED ASPIRIN) 325 MG TABS Take 325 mg by mouth daily  Historical Provider, MD       Social History     Tobacco Use    Smoking status: Current Every Day Smoker     Packs/day: 2.00     Years: 48.00     Pack years: 96.00     Types: Cigarettes     Start date: 7/20/1970    Smokeless tobacco: Former User   Substance Use Topics    Alcohol use: No    Drug use: No        No Known Allergies,   Past Medical History:   Diagnosis Date    [facility] ([facility] [address]     Headache    ,   Past Surgical History:   Procedure Laterality Date    NOSE SURGERY  09/2016   ,   Social History     Tobacco Use    Smoking status: Current Every Day Smoker     Packs/day: 2.00     Years: 48.00     Pack years: 96.00     Types: Cigarettes     Start date: 7/20/1970    Smokeless tobacco: Former User   Substance Use Topics    Alcohol use: No    Drug use: No   , No family history on file.,   Immunization History   Administered Date(s) Administered    Tdap (Boostrix, Adacel) 08/28/2017   ,   Health Maintenance   Topic Date Due    Potassium monitoring  1955    Creatinine monitoring  1955    AAA screen  1955    Hepatitis C screen  1955    HIV screen  06/07/1970    Lipid screen  06/07/1995    Shingles Vaccine (1 of 2) 06/07/2005    Colon cancer screen colonoscopy  06/07/2005    Low dose CT lung screening  06/07/2010    Flu vaccine (1) 09/01/2020    Pneumococcal 65+ years Vaccine (1 of 1 - PPSV23) 09/03/2021 (Originally 6/7/2020)    DTaP/Tdap/Td vaccine (2 - Td) 08/28/2027    Hepatitis A vaccine  Aged Out    Hepatitis B vaccine  Aged Out    Hib vaccine  Aged Out    Meningococcal (ACWY) vaccine  Aged Out       PHYSICAL EXAMINATION:  [ INSTRUCTIONS:  \"[x]\" Indicates a positive item  \"[]\" Indicates a negative item  -- DELETE ALL ITEMS NOT EXAMINED]  Vital Signs: (As obtained by patient/caregiver or practitioner observation)    Blood pressure-  Heart rate-    Respiratory rate-    Temperature-  Pulse oximetry-     Constitutional: [x] Appears well-developed and well-nourished [x] No apparent distress      [] Abnormal-   Mental status  [x] Alert and awake  [] Oriented to person/place/time []Able to follow commands      Eyes:  EOM    []  Normal  [] Abnormal-  Sclera  []  Normal  [] Abnormal -         Discharge [x]  None visible  [] Abnormal -    HENT:   [] Normocephalic, atraumatic.   [] Abnormal   [x] Mouth/Throat: Mucous membranes are moist.     External Ears [x] Normal  [] Abnormal-     Neck: [x] No visualized mass     Pulmonary/Chest: [x] Respiratory effort normal.  [x] No visualized signs of difficulty breathing or respiratory distress        [] Abnormal-      Musculoskeletal:   [] Normal gait with no signs of ataxia         [x] Normal range of motion of neck        [] Abnormal- Neurological:        [x] No Facial Asymmetry (Cranial nerve 7 motor function) (limited exam to video visit)          [] No gaze palsy        [] Abnormal-         Skin:        [x] No significant exanthematous lesions or discoloration noted on facial skin         [] Abnormal-            Psychiatric:       [] Normal Affect [] No Hallucinations        [] Abnormal-     Other pertinent observable physical exam findings-     ASSESSMENT/PLAN:    ICD-10-CM    1. Essential hypertension  I10    2. Primary insomnia  F51.01 clonazePAM (KLONOPIN) 1 MG tablet   3. ABHINAV (generalized anxiety disorder)  F41.1 clonazePAM (KLONOPIN) 1 MG tablet   4. Chronic tension-type headache, not intractable  G44.229 butalbital-acetaminophen-caffeine (FIORICET, ESGIC) -40 MG per tablet       Return in about 3 months (around 12/3/2020). Lonny Hdez is a 72 y.o. male being evaluated by a Virtual Visit (video visit) encounter to address concerns as mentioned above. Verbal consent was given to conduct a teleheath visit. A caregiver was present when appropriate. Due to this being a TeleHealth encounter (During GYYOX-41 public health emergency), evaluation of the following organ systems was limited: Vitals/Constitutional/EENT/Resp/CV/GI//MS/Neuro/Skin/Heme-Lymph-Imm. Pursuant to the emergency declaration under the [address] [place] [address] authority and the [employer] Act, this Virtual Visit was conducted with patient's (and/or legal guardian's) consent, to reduce the patient's risk of exposure to COVID-19 and provide necessary medical care. The patient (and/or legal guardian) has also been advised to contact this office for worsening conditions or problems, and seek emergency medical treatment and/or call 911 if deemed necessary.      Services were provided through a video synchronous discussion virtually to substitute for in-person clinic visit. Patient was located at their individual home and provider was located at the office of [facility]. Patient identification was verified at the start of the visit: Yes    Total time spent on this encounter: Not billed by time    Due to patients risk for potential exposure of COVID 19 pandemic, a virtual visit was initiated. Provider performed history of present illness and review of systems. Diagnosis and treatment plan was discussed with patient. Pharmacy of choice was reviewed along with past medical history, medication allergies, and current medications. Education provided to patient or patient parents/guardian with current illness diagnosis as well as when to seek additional healthcare due to changing or for worsening symptoms. Patient voiced understanding. --CAPO Ford on 9/3/2020 at 10:40 AM    An electronic signature was used to authenticate this note.

## 2020-12-03 ENCOUNTER — VIRTUAL VISIT (OUTPATIENT)
Dept: PRIMARY CARE CLINIC | Age: 65
End: 2020-12-03
Payer: MEDICAID

## 2020-12-03 VITALS — DIASTOLIC BLOOD PRESSURE: 88 MMHG | SYSTOLIC BLOOD PRESSURE: 138 MMHG

## 2020-12-03 PROCEDURE — 99214 OFFICE O/P EST MOD 30 MIN: CPT | Performed by: NURSE PRACTITIONER

## 2020-12-03 RX ORDER — AMOXICILLIN AND CLAVULANATE POTASSIUM 875; 125 MG/1; MG/1
1 TABLET, FILM COATED ORAL 2 TIMES DAILY
Qty: 20 TABLET | Refills: 0 | Status: SHIPPED | OUTPATIENT
Start: 2020-12-03 | End: 2020-12-13

## 2020-12-03 RX ORDER — CLONAZEPAM 1 MG/1
1 TABLET ORAL 3 TIMES DAILY PRN
Qty: 90 TABLET | Refills: 0 | Status: SHIPPED | OUTPATIENT
Start: 2020-12-03 | End: 2021-03-25 | Stop reason: SDUPTHER

## 2020-12-03 RX ORDER — BUTALBITAL, ACETAMINOPHEN AND CAFFEINE 50; 325; 40 MG/1; MG/1; MG/1
1 TABLET ORAL EVERY 6 HOURS PRN
Qty: 120 TABLET | Refills: 0 | Status: SHIPPED | OUTPATIENT
Start: 2020-12-03 | End: 2021-03-22 | Stop reason: SDUPTHER

## 2020-12-03 ASSESSMENT — ENCOUNTER SYMPTOMS
DIARRHEA: 0
ROS SKIN COMMENTS: AK
EYE REDNESS: 0
CHOKING: 0
SHORTNESS OF BREATH: 0
RHINORRHEA: 0
COUGH: 0
WHEEZING: 0
BLOOD IN STOOL: 0
SORE THROAT: 0
CONSTIPATION: 0
EYE DISCHARGE: 0

## 2020-12-03 NOTE — PROGRESS NOTES
Negative for adenopathy. Psychiatric/Behavioral: Negative for suicidal ideas. Prior to Visit Medications    Medication Sig Taking? Authorizing Provider   clonazePAM (KLONOPIN) 1 MG tablet Take 1 tablet by mouth 3 times daily as needed for Anxiety for up to 30 days.  Yes CAPO Campos   butalbital-acetaminophen-caffeine (FIORICET, ESGIC) -40 MG per tablet Take 1 tablet by mouth every 6 hours as needed for Headaches Yes CAPO Gutierrez   amoxicillin-clavulanate (AUGMENTIN) 875-125 MG per tablet Take 1 tablet by mouth 2 times daily for 10 days Yes CAPO Mendoza   amLODIPine (NORVASC) 10 MG tablet Take 1 tablet by mouth daily  CAPO Gutierrez   amitriptyline (ELAVIL) 25 MG tablet Take 1 tablet by mouth nightly For headache prevention and sleep  CAPO Gutierrez   irbesartan (AVAPRO) 300 MG tablet Take 1 tablet by mouth daily  CAPO Gutierrez   omeprazole (PRILOSEC) 40 MG delayed release capsule Take 1 capsule by mouth daily  CAPO Gutierrez   cloNIDine (CATAPRES) 0.1 MG tablet Take 1 tablet by mouth every 4 hours as needed for High Blood Pressure sbp great 180 or dbp great 95  CAPO Gutierrez   propranolol (INDERAL LA) 120 MG extended release capsule Take 1 capsule by mouth daily  CAPO Gutierrez   methylPREDNISolone (MEDROLBEVERLY,) 4 MG tablet Take p.o. as directed  CAPO Mendoza   Aspirin Buf,GwGrq-PtIju-AuJrt, (BUFFERED ASPIRIN) 325 MG TABS Take 325 mg by mouth daily  Historical Provider, MD       Social History     Tobacco Use    Smoking status: Current Every Day Smoker     Packs/day: 2.00     Years: 48.00     Pack years: 96.00     Types: Cigarettes     Start date: 7/20/1970    Smokeless tobacco: Former User   Substance Use Topics    Alcohol use: No    Drug use: No        No Known Allergies,   Past Medical History:   Diagnosis Date    Cancer ([facility] [address]     Headache    ,   Past Surgical History:   Procedure Laterality Date    NOSE SURGERY 09/2016   ,   Social History     Tobacco Use    Smoking status: Current Every Day Smoker     Packs/day: 2.00     Years: 48.00     Pack years: 96.00     Types: Cigarettes     Start date: 7/20/1970    Smokeless tobacco: Former User   Substance Use Topics    Alcohol use: No    Drug use: No   , No family history on file.,   Immunization History   Administered Date(s) Administered    Tdap (Boostrix, Adacel) 08/28/2017   ,   Health Maintenance   Topic Date Due    Potassium monitoring  1955    Creatinine monitoring  1955    AAA screen  1955    Hepatitis C screen  1955    HIV screen  06/07/1970    Lipid screen  06/07/1995    Shingles Vaccine (1 of 2) 06/07/2005    Colon cancer screen colonoscopy  06/07/2005    Low dose CT lung screening  06/07/2010    Flu vaccine (1) 09/01/2020    Pneumococcal 65+ years Vaccine (1 of 1 - PPSV23) 09/03/2021 (Originally 6/7/2020)    DTaP/Tdap/Td vaccine (2 - Td) 08/28/2027    Hepatitis A vaccine  Aged Out    Hepatitis B vaccine  Aged Out    Hib vaccine  Aged Out    Meningococcal (ACWY) vaccine  Aged Out       PHYSICAL EXAMINATION:  [ INSTRUCTIONS:  \"[x]\" Indicates a positive item  \"[]\" Indicates a negative item  -- DELETE ALL ITEMS NOT EXAMINED]  Vital Signs: (As obtained by patient/caregiver or practitioner observation)    Blood pressure-  Heart rate-    Respiratory rate-    Temperature-  Pulse oximetry-     Constitutional: [x] Appears well-developed and well-nourished [x] No apparent distress      [] Abnormal-   Mental status  [x] Alert and awake  [] Oriented to person/place/time []Able to follow commands      Eyes:  EOM    []  Normal  [] Abnormal-  Sclera  []  Normal  [] Abnormal -         Discharge [x]  None visible  [] Abnormal -    HENT:   [] Normocephalic, atraumatic.   [] Abnormal   [x] Mouth/Throat: Mucous membranes are moist.     External Ears [x] Normal  [] Abnormal-     Neck: [x] No visualized mass     Pulmonary/Chest: [x] Respiratory effort normal.  [x] No visualized signs of difficulty breathing or respiratory distress        [] Abnormal-      Musculoskeletal:   [] Normal gait with no signs of ataxia         [x] Normal range of motion of neck        [] Abnormal-       Neurological:        [x] No Facial Asymmetry (Cranial nerve 7 motor function) (limited exam to video visit)          [] No gaze palsy        [] Abnormal-         Skin:        [x] No significant exanthematous lesions or discoloration noted on facial skin         [] Abnormal-            Psychiatric:       [] Normal Affect [] No Hallucinations        [] Abnormal-     Other pertinent observable physical exam findings-     ASSESSMENT/PLAN:    ICD-10-CM    1. Chronic tension-type headache, not intractable  G44.229 butalbital-acetaminophen-caffeine (FIORICET, ESGIC) -40 MG per tablet   2. Primary insomnia  F51.01 clonazePAM (KLONOPIN) 1 MG tablet   3. ABHINAV (generalized anxiety disorder)  F41.1 clonazePAM (KLONOPIN) 1 MG tablet   4. Absence of tooth, acquired  K08.409    5. Abscessed tooth  K04.7 amoxicillin-clavulanate (AUGMENTIN) 875-125 MG per tablet     Controlled Substance Monitoring:    Acute and Chronic Pain Monitoring:   RX Monitoring 12/3/2020   Attestation The Prescription Monitoring Report for this patient was reviewed today. Periodic Controlled Substance Monitoring Possible medication side effects, risk of tolerance/dependence & alternative treatments discussed. ;No signs of potential drug abuse or diversion identified. Chronic Pain > 80 MEDD -         Return in about 3 months (around 3/3/2021). Keke Hi is a 72 y.o. male being evaluated by a Virtual Visit (video visit) encounter to address concerns as mentioned above. Verbal consent was given to conduct a teleheath visit. A caregiver was present when appropriate.  Due to this being a TeleHealth encounter (During KXCEG-00 public health emergency), evaluation of the following organ systems was limited:

## 2021-02-03 DIAGNOSIS — I10 ESSENTIAL HYPERTENSION: ICD-10-CM

## 2021-02-03 RX ORDER — AMLODIPINE BESYLATE 10 MG/1
10 TABLET ORAL DAILY
Qty: 30 TABLET | Refills: 11 | Status: SHIPPED | OUTPATIENT
Start: 2021-02-03 | End: 2021-10-29

## 2021-02-03 NOTE — TELEPHONE ENCOUNTER
Received call from daughter requesting refill on pts medication(s). Pt was last seen in office on 12/3/2020  and has a follow up scheduled for Visit date not found. Will send request to  Beny Matthews  for patient.      Requested Prescriptions     Pending Prescriptions Disp Refills    amLODIPine (NORVASC) 10 MG tablet 30 tablet 11     Sig: Take 1 tablet by mouth daily

## 2021-02-17 DIAGNOSIS — I10 ESSENTIAL HYPERTENSION: ICD-10-CM

## 2021-02-17 NOTE — TELEPHONE ENCOUNTER
Received fax from pharmacy requesting refill on pts medication(s). Pt was last seen in office on 12/3/2020  and has a follow up scheduled for Visit date not found. Will send request to  Dr. Yvonne Thomas  for patient   .      Requested Prescriptions     Pending Prescriptions Disp Refills    irbesartan (AVAPRO) 300 MG tablet 30 tablet 11     Sig: Take 1 tablet by mouth daily

## 2021-02-18 RX ORDER — IRBESARTAN 300 MG/1
300 TABLET ORAL DAILY
Qty: 90 TABLET | Refills: 3 | Status: SHIPPED | OUTPATIENT
Start: 2021-02-18 | End: 2021-10-29

## 2021-03-22 DIAGNOSIS — G44.229 CHRONIC TENSION-TYPE HEADACHE, NOT INTRACTABLE: ICD-10-CM

## 2021-03-22 RX ORDER — OMEPRAZOLE 40 MG/1
40 CAPSULE, DELAYED RELEASE ORAL DAILY
Qty: 90 CAPSULE | Refills: 3 | Status: SHIPPED | OUTPATIENT
Start: 2021-03-22 | End: 2022-05-11

## 2021-03-22 RX ORDER — BUTALBITAL, ACETAMINOPHEN AND CAFFEINE 50; 325; 40 MG/1; MG/1; MG/1
1 TABLET ORAL EVERY 6 HOURS PRN
Qty: 120 TABLET | Refills: 0 | Status: SHIPPED | OUTPATIENT
Start: 2021-03-22 | End: 2021-06-25 | Stop reason: SDUPTHER

## 2021-03-25 ENCOUNTER — VIRTUAL VISIT (OUTPATIENT)
Dept: PRIMARY CARE CLINIC | Age: 66
End: 2021-03-25
Payer: MEDICARE

## 2021-03-25 DIAGNOSIS — F51.01 PRIMARY INSOMNIA: ICD-10-CM

## 2021-03-25 DIAGNOSIS — I10 ESSENTIAL HYPERTENSION: Primary | ICD-10-CM

## 2021-03-25 DIAGNOSIS — G44.229 CHRONIC TENSION-TYPE HEADACHE, NOT INTRACTABLE: ICD-10-CM

## 2021-03-25 DIAGNOSIS — F41.1 GAD (GENERALIZED ANXIETY DISORDER): ICD-10-CM

## 2021-03-25 PROCEDURE — 99214 OFFICE O/P EST MOD 30 MIN: CPT | Performed by: NURSE PRACTITIONER

## 2021-03-25 PROCEDURE — 1123F ACP DISCUSS/DSCN MKR DOCD: CPT | Performed by: NURSE PRACTITIONER

## 2021-03-25 PROCEDURE — G8427 DOCREV CUR MEDS BY ELIG CLIN: HCPCS | Performed by: NURSE PRACTITIONER

## 2021-03-25 PROCEDURE — 3017F COLORECTAL CA SCREEN DOC REV: CPT | Performed by: NURSE PRACTITIONER

## 2021-03-25 PROCEDURE — 4040F PNEUMOC VAC/ADMIN/RCVD: CPT | Performed by: NURSE PRACTITIONER

## 2021-03-25 RX ORDER — CLONAZEPAM 1 MG/1
1 TABLET ORAL 3 TIMES DAILY PRN
Qty: 90 TABLET | Refills: 0 | Status: SHIPPED | OUTPATIENT
Start: 2021-03-25 | End: 2021-06-25 | Stop reason: SDUPTHER

## 2021-03-25 ASSESSMENT — ENCOUNTER SYMPTOMS
EYE DISCHARGE: 0
WHEEZING: 0
CONSTIPATION: 0
SORE THROAT: 0
COUGH: 0
EYE REDNESS: 0
DIARRHEA: 0
BLOOD IN STOOL: 0
CHOKING: 0
SHORTNESS OF BREATH: 0
RHINORRHEA: 0

## 2021-03-25 ASSESSMENT — PATIENT HEALTH QUESTIONNAIRE - PHQ9
1. LITTLE INTEREST OR PLEASURE IN DOING THINGS: 0
2. FEELING DOWN, DEPRESSED OR HOPELESS: 0
SUM OF ALL RESPONSES TO PHQ QUESTIONS 1-9: 0

## 2021-03-25 NOTE — PROGRESS NOTES
Nehemias Simpson (:  1955) is a 72 y.o. male,Established patient, here for evaluation of the following chief complaint(s): Headache, Hypertension, and Insomnia      ASSESSMENT/PLAN:  1. Essential hypertension  2. Primary insomnia  -     clonazePAM (KLONOPIN) 1 MG tablet; Take 1 tablet by mouth 3 times daily as needed for Anxiety for up to 30 days. , Disp-90 tablet, R-0Normal  3. ABHINAV (generalized anxiety disorder)  -     clonazePAM (KLONOPIN) 1 MG tablet; Take 1 tablet by mouth 3 times daily as needed for Anxiety for up to 30 days. , Disp-90 tablet, R-0Normal  4. Chronic tension-type headache, not intractable    Controlled Substance Monitoring:    Acute and Chronic Pain Monitoring:   RX Monitoring 3/25/2021   Attestation The Prescription Monitoring Report for this patient was reviewed today. Periodic Controlled Substance Monitoring Possible medication side effects, risk of tolerance/dependence & alternative treatments discussed. ;No signs of potential drug abuse or diversion identified. Chronic Pain > 80 MEDD -         Return in about 3 months (around 2021) for bp, insomnia, HAs.    SUBJECTIVE/OBJECTIVE:  HPI  Headaches  fiorecet helps. He will have somedays and not have a HA and then some days it will hurt all day.   Denies imaging and referral to neurology    Insomnia. This has improved with the klonopin. He is taking it at night. It is working to help him sleep   He is needing a refill on this medication    Hypertension   This is a chronic problem. The current episode started more than 1 year ago. The problem has been gradually improving since onset. The problem is uncontrolled. Associated symptoms include headaches. Pertinent negatives include no chest pain, palpitations, peripheral edema or shortness of breath. There are no associated agents to hypertension. The current treatment provides moderate improvement.  There are no compliance problems. Tala Gill is no history of angina.     Review of Systems   Constitutional: Negative for appetite change and unexpected weight change. HENT: Negative for congestion, ear pain, rhinorrhea and sore throat. Eyes: Negative for discharge and redness. Respiratory: Negative for cough, choking, shortness of breath and wheezing. Cardiovascular: Negative for chest pain and palpitations. Gastrointestinal: Negative for blood in stool, constipation and diarrhea. Genitourinary: Negative for decreased urine volume and dysuria. Skin: Negative for rash. Neurological: Positive for headaches. Negative for weakness. Hematological: Negative for adenopathy. Psychiatric/Behavioral: Negative for suicidal ideas.        Patient-Reported Vitals 3/25/2021   Patient-Reported Systolic 293   Patient-Reported Diastolic 82        Physical Exam    [INSTRUCTIONS:  \"[x]\" Indicates a positive item  \"[]\" Indicates a negative item  -- DELETE ALL ITEMS NOT EXAMINED]    Constitutional: [x] Appears well-developed and well-nourished [x] No apparent distress      [] Abnormal -     Mental status: [x] Alert and awake  [x] Oriented to person/place/time [x] Able to follow commands    [] Abnormal -     Eyes:   EOM    [x]  Normal    [] Abnormal -   Sclera  [x]  Normal    [] Abnormal -          Discharge [x]  None visible   [] Abnormal -     HENT: [x] Normocephalic, atraumatic  [] Abnormal -   [x] Mouth/Throat: Mucous membranes are moist    External Ears [x] Normal  [] Abnormal -    Neck: [x] No visualized mass [] Abnormal -     Pulmonary/Chest: [x] Respiratory effort normal   [x] No visualized signs of difficulty breathing or respiratory distress        [] Abnormal -      Musculoskeletal:   [x] Normal gait with no signs of ataxia         [x] Normal range of motion of neck        [] Abnormal -     Neurological:        [x] No Facial Asymmetry (Cranial nerve 7 motor function) (limited exam due to video visit)          [x] No gaze palsy        [] Abnormal -          Skin:        [] No significant exanthematous lesions or discoloration noted on facial skin         [x] Abnormal - bandage on nose           Psychiatric:       [x] Normal Affect [] Abnormal -        [x] No Hallucinations    Other pertinent observable physical exam findings:-                Falguni Rodriguez, was evaluated through a synchronous (real-time) audio-video encounter. The patient (or guardian if applicable) is aware that this is a billable service. Verbal consent to proceed has been obtained within the past 12 months. The visit was conducted pursuant to the emergency declaration under the [address] [place] [address], [address] [place] [address] authority and the [employer] and [employer] Act. Patient identification was verified, and a caregiver was present when appropriate. The patient was located in a state where the provider was credentialed to provide care. An electronic signature was used to authenticate this note.     --CAPO Espinoza

## 2021-06-25 ENCOUNTER — VIRTUAL VISIT (OUTPATIENT)
Dept: PRIMARY CARE CLINIC | Age: 66
End: 2021-06-25
Payer: MEDICARE

## 2021-06-25 DIAGNOSIS — F41.1 GAD (GENERALIZED ANXIETY DISORDER): ICD-10-CM

## 2021-06-25 DIAGNOSIS — G44.229 CHRONIC TENSION-TYPE HEADACHE, NOT INTRACTABLE: ICD-10-CM

## 2021-06-25 DIAGNOSIS — K04.7 ABSCESSED TOOTH: Primary | ICD-10-CM

## 2021-06-25 DIAGNOSIS — F51.01 PRIMARY INSOMNIA: ICD-10-CM

## 2021-06-25 PROCEDURE — 3017F COLORECTAL CA SCREEN DOC REV: CPT | Performed by: NURSE PRACTITIONER

## 2021-06-25 PROCEDURE — 4040F PNEUMOC VAC/ADMIN/RCVD: CPT | Performed by: NURSE PRACTITIONER

## 2021-06-25 PROCEDURE — G8427 DOCREV CUR MEDS BY ELIG CLIN: HCPCS | Performed by: NURSE PRACTITIONER

## 2021-06-25 PROCEDURE — 1123F ACP DISCUSS/DSCN MKR DOCD: CPT | Performed by: NURSE PRACTITIONER

## 2021-06-25 PROCEDURE — 99214 OFFICE O/P EST MOD 30 MIN: CPT | Performed by: NURSE PRACTITIONER

## 2021-06-25 RX ORDER — BUTALBITAL, ACETAMINOPHEN AND CAFFEINE 50; 325; 40 MG/1; MG/1; MG/1
1 TABLET ORAL EVERY 6 HOURS PRN
Qty: 120 TABLET | Refills: 0 | Status: SHIPPED | OUTPATIENT
Start: 2021-06-25 | End: 2021-10-05 | Stop reason: SDUPTHER

## 2021-06-25 RX ORDER — CLONAZEPAM 1 MG/1
1 TABLET ORAL 3 TIMES DAILY PRN
Qty: 90 TABLET | Refills: 0 | Status: SHIPPED | OUTPATIENT
Start: 2021-06-25 | End: 2021-10-05 | Stop reason: SDUPTHER

## 2021-06-25 RX ORDER — CLINDAMYCIN HYDROCHLORIDE 300 MG/1
300 CAPSULE ORAL 3 TIMES DAILY
Qty: 30 CAPSULE | Refills: 0 | Status: SHIPPED | OUTPATIENT
Start: 2021-06-25 | End: 2021-07-05

## 2021-06-25 ASSESSMENT — ENCOUNTER SYMPTOMS
SHORTNESS OF BREATH: 0
CONSTIPATION: 0
EYE REDNESS: 0
RHINORRHEA: 0
COUGH: 0
DIARRHEA: 0
WHEEZING: 0
BLOOD IN STOOL: 0
EYE DISCHARGE: 0
CHOKING: 0
SORE THROAT: 0

## 2021-06-25 NOTE — PROGRESS NOTES
[x] Normal  [] Abnormal -    Neck: [x] No visualized mass [] Abnormal -     Pulmonary/Chest: [x] Respiratory effort normal   [x] No visualized signs of difficulty breathing or respiratory distress        [] Abnormal -      Musculoskeletal:   [x] Normal gait with no signs of ataxia         [x] Normal range of motion of neck        [] Abnormal -     Neurological:        [x] No Facial Asymmetry (Cranial nerve 7 motor function) (limited exam due to video visit)          [x] No gaze palsy        [] Abnormal -          Skin:        [] No significant exanthematous lesions or discoloration noted on facial skin         [x] Abnormal - bandage on nose           Psychiatric:       [x] Normal Affect [] Abnormal -        [x] No Hallucinations    Other pertinent observable physical exam findings:-                Alivia Guzman, was evaluated through a synchronous (real-time) audio-video encounter. The patient (or guardian if applicable) is aware that this is a billable service. Verbal consent to proceed has been obtained within the past 12 months. The visit was conducted pursuant to the emergency declaration under the [address] [place] [address] authority and the [employer] Act. Patient identification was verified, and a caregiver was present when appropriate. The patient was located in a state where the provider was credentialed to provide care. An electronic signature was used to authenticate this note.     --CAPO Brennan

## 2021-09-17 ENCOUNTER — TELEPHONE (OUTPATIENT)
Dept: PRIMARY CARE CLINIC | Age: 66
End: 2021-09-17

## 2021-09-17 DIAGNOSIS — R06.02 SOB (SHORTNESS OF BREATH): Primary | ICD-10-CM

## 2021-09-17 NOTE — TELEPHONE ENCOUNTER
Daughter called, wants to know if she can get an inhaler for him. He is getting short of breath walking long distances. Pt has had his nose removed due to cancer. He smokes about 2 ppd. He is very stubborn. He has been walking from the parking lot up to the 7th floor of the hospital to visit his wife.

## 2021-09-24 ENCOUNTER — NURSE TRIAGE (OUTPATIENT)
Dept: OTHER | Facility: CLINIC | Age: 66
End: 2021-09-24

## 2021-09-24 NOTE — TELEPHONE ENCOUNTER
Reason for Disposition  Zannie Cowden Caller has already spoken with another triager or PCP AND has further questions AND triager able to answer questions. Protocols used: NO CONTACT OR DUPLICATE CONTACT CALL-ADULT-AH    Received call from Nicollette at [facility] with Red Flag Complaint. Brief description of triage: No triage. Pt/daughter called to state that inhaler prescribed for pt has not improved his SOB w/exertion over the last 2-3 weeks. Needs office appt per PCP instruction if no improvement. Care advice provided, patient verbalizes understanding; denies any other questions or concerns; instructed to call back for any new or worsening symptoms. Writer provided warm transfer to [facility] for appointment scheduling. Attention Provider: Thank you for allowing me to participate in the care of your patient. The patient was connected to triage in response to information provided to the ECC/PSC. Please do not respond through this encounter as the response is not directed to a shared pool.

## 2021-09-27 ENCOUNTER — TELEPHONE (OUTPATIENT)
Dept: PRIMARY CARE CLINIC | Age: 66
End: 2021-09-27

## 2021-09-27 NOTE — TELEPHONE ENCOUNTER
----- Message from Joe Face sent at 9/24/2021  4:42 PM CDT -----  Subject: Appointment Request    Reason for Call: Semi-Routine Return from RN Triage    QUESTIONS  Type of Appointment? Established Patient  Reason for appointment request? No appointments available during search  Additional Information for Provider? Return nurse triage needs to be seen   sometime next week or before his appointment on October 5th.   ---------------------------------------------------------------------------  --------------  CALL BACK INFO  What is the best way for the office to contact you? OK to leave message on   voicemail  Preferred Call Back Phone Number? [phone]  ---------------------------------------------------------------------------  --------------  SCRIPT ANSWERS  Patient needs to be seen within 5 days? Yes   Nurse Name? Krissy Zamora  Have you been diagnosed with, awaiting test results for, or told that you   are suspected of having COVID-19 (Coronavirus)? (If patient has tested   negative or was tested as a requirement for work, school, or travel and   not based on symptoms, answer no)? No  Within the past two weeks have you developed any of the following symptoms   (answer no if symptoms have been present longer than 2 weeks or began   more than 2 weeks ago)? Fever or Chills, Cough, Shortness of breath or   difficulty breathing, Loss of taste or smell, Sore throat, Nasal   congestion, Sneezing or runny nose, Fatigue or generalized body aches   (answer no if pain is specific to a body part e.g. back pain), Diarrhea,   Headache? No  Have you had close contact with someone with COVID-19 in the last 14 days? No  (Service Expert  click yes below to proceed with [employer] As Usual   Scheduling)?  Yes

## 2021-10-05 ENCOUNTER — TELEPHONE (OUTPATIENT)
Dept: PRIMARY CARE CLINIC | Age: 66
End: 2021-10-05

## 2021-10-05 ENCOUNTER — HOSPITAL ENCOUNTER (OUTPATIENT)
Dept: GENERAL RADIOLOGY | Age: 66
Discharge: HOME | End: 2021-10-05
Payer: MEDICARE

## 2021-10-05 ENCOUNTER — OFFICE VISIT (OUTPATIENT)
Dept: PRIMARY CARE CLINIC | Age: 66
End: 2021-10-05
Payer: MEDICARE

## 2021-10-05 VITALS
SYSTOLIC BLOOD PRESSURE: 130 MMHG | TEMPERATURE: 97.3 F | HEIGHT: 70 IN | OXYGEN SATURATION: 97 % | HEART RATE: 80 BPM | BODY MASS INDEX: 25.02 KG/M2 | DIASTOLIC BLOOD PRESSURE: 88 MMHG | WEIGHT: 174.75 LBS

## 2021-10-05 DIAGNOSIS — R07.89 CHEST TIGHTNESS: Primary | ICD-10-CM

## 2021-10-05 DIAGNOSIS — F17.218 NICOTINE DEPENDENCE, CIGARETTES, WITH OTHER NICOTINE-INDUCED DISORDERS: ICD-10-CM

## 2021-10-05 DIAGNOSIS — G44.229 CHRONIC TENSION-TYPE HEADACHE, NOT INTRACTABLE: ICD-10-CM

## 2021-10-05 DIAGNOSIS — R07.89 CHEST TIGHTNESS: ICD-10-CM

## 2021-10-05 DIAGNOSIS — R94.31 ABNORMAL EKG: ICD-10-CM

## 2021-10-05 DIAGNOSIS — R06.02 SOB (SHORTNESS OF BREATH): ICD-10-CM

## 2021-10-05 DIAGNOSIS — R07.89 CHEST DISCOMFORT: ICD-10-CM

## 2021-10-05 DIAGNOSIS — Z11.59 ENCOUNTER FOR HEPATITIS C SCREENING TEST FOR LOW RISK PATIENT: ICD-10-CM

## 2021-10-05 DIAGNOSIS — F41.1 GAD (GENERALIZED ANXIETY DISORDER): ICD-10-CM

## 2021-10-05 DIAGNOSIS — F51.01 PRIMARY INSOMNIA: ICD-10-CM

## 2021-10-05 PROCEDURE — 93000 ELECTROCARDIOGRAM COMPLETE: CPT | Performed by: NURSE PRACTITIONER

## 2021-10-05 PROCEDURE — 99214 OFFICE O/P EST MOD 30 MIN: CPT | Performed by: NURSE PRACTITIONER

## 2021-10-05 PROCEDURE — 99406 BEHAV CHNG SMOKING 3-10 MIN: CPT | Performed by: NURSE PRACTITIONER

## 2021-10-05 PROCEDURE — 71046 X-RAY EXAM CHEST 2 VIEWS: CPT

## 2021-10-05 RX ORDER — BUTALBITAL, ACETAMINOPHEN AND CAFFEINE 50; 325; 40 MG/1; MG/1; MG/1
1 TABLET ORAL EVERY 6 HOURS PRN
Qty: 120 TABLET | Refills: 0 | Status: SHIPPED | OUTPATIENT
Start: 2021-10-05 | End: 2021-12-10 | Stop reason: SDUPTHER

## 2021-10-05 RX ORDER — CLONAZEPAM 1 MG/1
1 TABLET ORAL 3 TIMES DAILY PRN
Qty: 90 TABLET | Refills: 0 | Status: SHIPPED | OUTPATIENT
Start: 2021-10-05 | End: 2021-12-10 | Stop reason: SDUPTHER

## 2021-10-05 RX ORDER — NICOTINE 21 MG/24HR
1 PATCH, TRANSDERMAL 24 HOURS TRANSDERMAL EVERY 24 HOURS
Qty: 30 PATCH | Refills: 3 | Status: SHIPPED | OUTPATIENT
Start: 2021-10-05 | End: 2022-04-29

## 2021-10-05 ASSESSMENT — ENCOUNTER SYMPTOMS
DIARRHEA: 0
CONSTIPATION: 0
EYE REDNESS: 0
BLOOD IN STOOL: 0
COUGH: 0
WHEEZING: 0
CHOKING: 0
SHORTNESS OF BREATH: 1
EYE DISCHARGE: 0
RHINORRHEA: 0
SORE THROAT: 0

## 2021-10-05 ASSESSMENT — PATIENT HEALTH QUESTIONNAIRE - PHQ9
2. FEELING DOWN, DEPRESSED OR HOPELESS: 0
SUM OF ALL RESPONSES TO PHQ QUESTIONS 1-9: 0
1. LITTLE INTEREST OR PLEASURE IN DOING THINGS: 0
SUM OF ALL RESPONSES TO PHQ9 QUESTIONS 1 & 2: 0
SUM OF ALL RESPONSES TO PHQ QUESTIONS 1-9: 0
SUM OF ALL RESPONSES TO PHQ QUESTIONS 1-9: 0

## 2021-10-05 NOTE — PROGRESS NOTES
Maryellen Ervin (:  1955) is a 77 y.o. male,Established patient, here for evaluation of the following chief complaint(s):  3 Month Follow-Up, Health Maintenance (labs, AAA screen, Colon cancer screen, CT lung screen, Shingles shot, Pneumonia shot, Flu shot, Covid shot), Hypertension (pt doesn't check it all the time. Has checked it a couple of times. Was fine. ), Shortness of Breath (been having alot of SOB. Did get an inhaler sent in but this isn't helping. ), Chest Pain (tightness. Will agree to do a chest xray. ), and Discuss Medications (would like some nicotine patches to slow down on smoking. )      ASSESSMENT/PLAN:    ICD-10-CM    1. Chest tightness  R07.89 XR CHEST (2 VW)     ECHO Stress Test   2. Encounter for hepatitis C screening test for low risk patient  Z11.59    3. Chest discomfort  R07.89 EKG 12 Lead     EKG 12 Lead     XR CHEST (2 VW)     ECHO Stress Test   4. SOB (shortness of breath)  R06.02 XR CHEST (2 VW)     ECHO Stress Test   5. Abnormal EKG  R94.31 ECHO Stress Test   6. Chronic tension-type headache, not intractable  G44.229 butalbital-acetaminophen-caffeine (FIORICET, ESGIC) -40 MG per tablet   7. Primary insomnia  F51.01 clonazePAM (KLONOPIN) 1 MG tablet   8. ABHINAV (generalized anxiety disorder)  F41.1 clonazePAM (KLONOPIN) 1 MG tablet   9. Nicotine dependence, cigarettes, with other nicotine-induced disorders  F17.218 nicotine (NICODERM CQ) 21 MG/24HR     MI TOBACCO USE CESSATION INTERMEDIATE 3-10 MINUTES     Approximately 3 minutes of education was provided about quit smoking and the harms of tobacco.  Patient does show understanding. Patient has  the desire to quit smoking in the near future. No follow-ups on file. SUBJECTIVE/OBJECTIVE:  HPI  He declines lab work and vaccines. And colonoscopy. SOB  With activity he is getting sob when he walks sometimes even short distances and will have to sit and catch his breath.  Gets chest tightness and tingling down his right and left arm. His inhaler does not help. Headaches  fiorecet helps. He will have somedays and not have a HA and then some days it will hurt all day.   Denies imaging and referral to neurology     Insomnia. This has improved with the klonopin. He is taking it at night. It is working to help him sleep   He is needing a refill on this medication     Review of Systems   Constitutional: Negative for appetite change and unexpected weight change. HENT: Negative for congestion, ear pain, rhinorrhea and sore throat. Eyes: Negative for discharge and redness. Respiratory: Positive for shortness of breath. Negative for cough, choking and wheezing. Cardiovascular: Positive for chest pain. Negative for palpitations and leg swelling. Gastrointestinal: Negative for blood in stool, constipation and diarrhea. Genitourinary: Negative for decreased urine volume and dysuria. Skin: Negative for rash. Neurological: Positive for headaches. Negative for weakness. Hematological: Negative for adenopathy. Psychiatric/Behavioral: Positive for sleep disturbance. Negative for suicidal ideas. The patient is nervous/anxious. /88   Pulse 80   Temp 97.3 °F (36.3 °C) (Temporal)   Ht 5' 10\" (1.778 m)   Wt 174 lb 12 oz (79.3 kg)   SpO2 97%   BMI 25.07 kg/m²    Physical Exam  Vitals reviewed. Constitutional:       Appearance: He is well-developed. HENT:      Head: Normocephalic. Nose: Nasal deformity (septum removed, wears bandage over nose) present. Mouth/Throat:      Dentition: Abnormal dentition. Dental caries present. Eyes:      Conjunctiva/sclera: Conjunctivae normal.   Cardiovascular:      Rate and Rhythm: Normal rate and regular rhythm. Heart sounds: Normal heart sounds. Pulmonary:      Effort: Pulmonary effort is normal.      Breath sounds: Normal breath sounds. No wheezing or rales.    Abdominal:      General: Bowel sounds are normal.      Palpations: Abdomen is soft.      Tenderness: There is no abdominal tenderness. Musculoskeletal:      Cervical back: Normal range of motion and neck supple. Skin:     General: Skin is warm and dry. Comments: Multiple AK on arms, hands,ears, and face   Neurological:      Mental Status: He is alert and oriented to person, place, and time. Psychiatric:         Behavior: Behavior normal.                 An electronic signature was used to authenticate this note.     --CAPO Villa

## 2021-10-05 NOTE — TELEPHONE ENCOUNTER
----- Message from CAPO Urbano sent at 10/5/2021  2:25 PM CDT -----  Please inform patient results show  Chronic lung changes with no acute disease.

## 2021-10-13 ENCOUNTER — HOSPITAL ENCOUNTER (OUTPATIENT)
Dept: NON INVASIVE DIAGNOSTICS | Age: 66
Discharge: HOME | End: 2021-10-13
Payer: MEDICARE

## 2021-10-13 DIAGNOSIS — R07.89 CHEST TIGHTNESS: ICD-10-CM

## 2021-10-13 DIAGNOSIS — R07.89 CHEST DISCOMFORT: ICD-10-CM

## 2021-10-13 DIAGNOSIS — R06.02 SOB (SHORTNESS OF BREATH): ICD-10-CM

## 2021-10-13 DIAGNOSIS — R94.31 ABNORMAL EKG: ICD-10-CM

## 2021-10-13 LAB
LV EF: 45 %
LVEF MODALITY: NORMAL

## 2021-10-13 PROCEDURE — 93350 STRESS TTE ONLY: CPT

## 2021-10-14 ENCOUNTER — TELEPHONE (OUTPATIENT)
Dept: CARDIOLOGY CLINIC | Age: 66
End: 2021-10-14

## 2021-10-14 NOTE — TELEPHONE ENCOUNTER
Called and spoke with patient, have cath scheduled for 10/20/21 at 130 with arrival of 1100. Patient is to be NPO after midnight. Patient instructed to arrive through front entrance of hospital and make immediate left. Patient advised they can have one person with them but they both must wear a mask. Patient advised may take morning medications with sip of water. Also advised patient must have COVID testing completed on 10/18/21 anywhere from 700 am - 1200 pm at [facility]. Advised patient that they will be able to proceed with procedure as long as test results are negative. Patient made aware that if testing is not resulted evening prior to procedure that they may have to be rescheduled and possibly retested. Given instructions on where to go and to self quarantine between testing and procedure. Patient does not have IV dye allergy. Patient verbally understood.

## 2021-10-18 ENCOUNTER — OFFICE VISIT (OUTPATIENT)
Age: 66
End: 2021-10-18

## 2021-10-18 DIAGNOSIS — Z11.59 SCREENING FOR VIRAL DISEASE: Primary | ICD-10-CM

## 2021-10-18 LAB — SARS-COV-2, PCR: NOT DETECTED

## 2021-10-18 PROCEDURE — 99999 PR OFFICE/OUTPT VISIT,PROCEDURE ONLY: CPT | Performed by: NURSE PRACTITIONER

## 2021-10-19 ENCOUNTER — TELEPHONE (OUTPATIENT)
Dept: PRIMARY CARE CLINIC | Age: 66
End: 2021-10-19

## 2021-10-19 DIAGNOSIS — R94.31 ABNORMAL EKG: ICD-10-CM

## 2021-10-19 DIAGNOSIS — R94.39 POSITIVE CARDIAC STRESS TEST: Primary | ICD-10-CM

## 2021-10-19 DIAGNOSIS — R07.89 CHEST DISCOMFORT: ICD-10-CM

## 2021-10-19 DIAGNOSIS — R06.02 SOB (SHORTNESS OF BREATH): ICD-10-CM

## 2021-10-19 DIAGNOSIS — R07.89 CHEST TIGHTNESS: ICD-10-CM

## 2021-10-19 NOTE — TELEPHONE ENCOUNTER
[id number] Clinical Staff  Subject: Message to Provider     QUESTIONS   Information for Provider? Pt had a heart cath appt but it had to be   cancelled because they couldnt get a PA for him because the doctors arent   providers on his insurance and daughter wants to know what they should do.     ---------------------------------------------------------------------------   --------------   CALL BACK INFO   What is the best way for the office to contact you? OK to leave message on   voicemail   Preferred Call Back Phone Number? [phone]   ---------------------------------------------------------------------------   --------------   SCRIPT ANSWERS   Relationship to Patient? Other   Representative Name? Marbella Thao   Is the Representative on the appropriate HIPAA document in Epic?  Yes

## 2021-10-19 NOTE — TELEPHONE ENCOUNTER
Called and spoke with patient's daughter, advised that Dr. Marco Heard was not in network for patient and procedure was not going to be covered. Advised we have to cancel at this time and [facility]. Advised to contact their office if she has not heard anything about getting scheduled with an in network provider. Verbally understood.

## 2021-10-19 NOTE — TELEPHONE ENCOUNTER
Spoke with pts daughter, she will call insurance and see who is in network.  She knows Santa Bynum is, but [facility]

## 2021-10-19 NOTE — TELEPHONE ENCOUNTER
Fatemeh with [employer], pt had a pos stress test and was set up for heart cath tomorrow.     It has been DENIED by insurance due to [facility] is not in network

## 2021-10-19 NOTE — TELEPHONE ENCOUNTER
Patient is scheduled for heart cath tomorrow due to positive stress test.  We have been waiting on insurance approval and it was sent back denied this AM due to Dr. Horacio Carbone not being in network for patient. I called and gave this message to Triage at [facility] office, they are going to pass message to her so they can get him set up somewhere that his insurance does cover.

## 2021-10-21 ENCOUNTER — APPOINTMENT (OUTPATIENT)
Dept: GENERAL RADIOLOGY | Age: 66
DRG: 251 | End: 2021-10-21
Payer: MEDICARE

## 2021-10-21 ENCOUNTER — HOSPITAL ENCOUNTER (OUTPATIENT)
Age: 66
Discharge: ANOTHER HOSPITAL | DRG: 251 | End: 2021-10-21
Attending: EMERGENCY MEDICINE | Admitting: INTERNAL MEDICINE
Payer: MEDICARE

## 2021-10-21 VITALS
DIASTOLIC BLOOD PRESSURE: 45 MMHG | RESPIRATION RATE: 26 BRPM | SYSTOLIC BLOOD PRESSURE: 105 MMHG | OXYGEN SATURATION: 97 % | TEMPERATURE: 97.9 F | HEART RATE: 102 BPM | WEIGHT: 182.8 LBS | HEIGHT: 70 IN | BODY MASS INDEX: 26.17 KG/M2

## 2021-10-21 DIAGNOSIS — I21.09 ST ELEVATION MYOCARDIAL INFARCTION (STEMI) INVOLVING OTHER CORONARY ARTERY OF ANTERIOR WALL (HCC): Primary | ICD-10-CM

## 2021-10-21 LAB
ALBUMIN SERPL-MCNC: 4.5 G/DL (ref 3.5–5.2)
ALP BLD-CCNC: 82 U/L (ref 40–130)
ALT SERPL-CCNC: 18 U/L (ref 5–41)
ANION GAP SERPL CALCULATED.3IONS-SCNC: 10 MMOL/L (ref 7–19)
APTT: 25.8 SEC (ref 26–36.2)
APTT: 59 SEC (ref 26–36.2)
AST SERPL-CCNC: 20 U/L (ref 5–40)
BASE EXCESS ARTERIAL: 0 (ref -3–3)
BILIRUB SERPL-MCNC: <0.2 MG/DL (ref 0.2–1.2)
BUN BLDV-MCNC: 15 MG/DL (ref 8–23)
CALCIUM IONIZED: 1.14 MMOL/L (ref 1.1–1.3)
CALCIUM SERPL-MCNC: 9.7 MG/DL (ref 8.8–10.2)
CHLORIDE BLD-SCNC: 100 MMOL/L (ref 98–111)
CHOLESTEROL, TOTAL: 200 MG/DL (ref 160–199)
CO2: 25 MEQ/L (ref 21–32)
CO2: 30 MMOL/L (ref 22–29)
CREAT SERPL-MCNC: 0.9 MG/DL (ref 0.5–1.2)
GFR AFRICAN AMERICAN: >59
GFR AFRICAN AMERICAN: >60
GFR NON-AFRICAN AMERICAN: >60
GFR NON-AFRICAN AMERICAN: >60
GLUCOSE BLD-MCNC: 118 MG/DL (ref 74–109)
GLUCOSE BLD-MCNC: 135 MG/DL (ref 70–99)
HCT VFR BLD CALC: 48.3 % (ref 42–52)
HDLC SERPL-MCNC: 43 MG/DL (ref 55–121)
HEMOGLOBIN: 12.9 GM/DL (ref 12–18)
HEMOGLOBIN: 15.4 G/DL (ref 14–18)
LACTIC ACID: 0.9 MMOL/L (ref 0.5–1.9)
LDL CHOLESTEROL CALCULATED: 147 MG/DL
LV EF: 28 %
LVEF MODALITY: NORMAL
MCH RBC QN AUTO: 31.2 PG (ref 27–31)
MCHC RBC AUTO-ENTMCNC: 31.9 G/DL (ref 33–37)
MCV RBC AUTO: 98 FL (ref 80–94)
O2 SAT, ARTERIAL: 93 % (ref 93–100)
PCO2 ARTERIAL: 41 MM HG (ref 35–48)
PDW BLD-RTO: 14.4 % (ref 11.5–14.5)
PERFORMED ON: ABNORMAL
PH ARTERIAL: 7.39 (ref 7.3–7.5)
PLATELET # BLD: 235 K/UL (ref 130–400)
PMV BLD AUTO: 10.3 FL (ref 9.4–12.4)
PO2 ARTERIAL: 67 MM HG (ref 83–108)
POC ANION GAP: 12
POC CHLORIDE: 101 MEQ/L (ref 99–110)
POC CREATININE: 0.8 MG/DL (ref 0.3–1.3)
POC HEMATOCRIT: 38 % (ref 37–52)
POC POTASSIUM: 3.7 MEQ/L (ref 3.5–5.1)
POC SAMPLE TYPE: ABNORMAL
POC SODIUM: 138 MEQ/L (ref 136–145)
POTASSIUM SERPL-SCNC: 3.7 MMOL/L (ref 3.5–5)
PRO-BNP: 513 PG/ML (ref 0–900)
RBC # BLD: 4.93 M/UL (ref 4.7–6.1)
SARS-COV-2, NAAT: NOT DETECTED
SODIUM BLD-SCNC: 140 MMOL/L (ref 136–145)
TCO2 ARTERIAL: 26 MMOL/L
TOTAL PROTEIN: 7.8 G/DL (ref 6.6–8.7)
TRIGL SERPL-MCNC: 49 MG/DL (ref 0–149)
TROPONIN: 0.09 NG/ML (ref 0–0.03)
TROPONIN: 0.22 NG/ML (ref 0–0.03)
TROPONIN: 1.79 NG/ML (ref 0–0.03)
WBC # BLD: 11.5 K/UL (ref 4.8–10.8)

## 2021-10-21 PROCEDURE — 87635 SARS-COV-2 COVID-19 AMP PRB: CPT

## 2021-10-21 PROCEDURE — 99152 MOD SED SAME PHYS/QHP 5/>YRS: CPT

## 2021-10-21 PROCEDURE — 85014 HEMATOCRIT: CPT

## 2021-10-21 PROCEDURE — 93005 ELECTROCARDIOGRAM TRACING: CPT | Performed by: INTERNAL MEDICINE

## 2021-10-21 PROCEDURE — 92941 PRQ TRLML REVSC TOT OCCL AMI: CPT | Performed by: INTERNAL MEDICINE

## 2021-10-21 PROCEDURE — 93005 ELECTROCARDIOGRAM TRACING: CPT | Performed by: EMERGENCY MEDICINE

## 2021-10-21 PROCEDURE — 82435 ASSAY OF BLOOD CHLORIDE: CPT

## 2021-10-21 PROCEDURE — C8929 TTE W OR WO FOL WCON,DOPPLER: HCPCS

## 2021-10-21 PROCEDURE — 6370000000 HC RX 637 (ALT 250 FOR IP): Performed by: EMERGENCY MEDICINE

## 2021-10-21 PROCEDURE — 82330 ASSAY OF CALCIUM: CPT

## 2021-10-21 PROCEDURE — C1769 GUIDE WIRE: HCPCS

## 2021-10-21 PROCEDURE — 93458 L HRT ARTERY/VENTRICLE ANGIO: CPT | Performed by: INTERNAL MEDICINE

## 2021-10-21 PROCEDURE — 83605 ASSAY OF LACTIC ACID: CPT

## 2021-10-21 PROCEDURE — 82374 ASSAY BLOOD CARBON DIOXIDE: CPT

## 2021-10-21 PROCEDURE — 84484 ASSAY OF TROPONIN QUANT: CPT

## 2021-10-21 PROCEDURE — 82810 BLOOD GASES O2 SAT ONLY: CPT

## 2021-10-21 PROCEDURE — 80053 COMPREHEN METABOLIC PANEL: CPT

## 2021-10-21 PROCEDURE — 82565 ASSAY OF CREATININE: CPT

## 2021-10-21 PROCEDURE — 82800 BLOOD PH: CPT

## 2021-10-21 PROCEDURE — 6360000004 HC RX CONTRAST MEDICATION: Performed by: INTERNAL MEDICINE

## 2021-10-21 PROCEDURE — 99152 MOD SED SAME PHYS/QHP 5/>YRS: CPT | Performed by: INTERNAL MEDICINE

## 2021-10-21 PROCEDURE — 36415 COLL VENOUS BLD VENIPUNCTURE: CPT

## 2021-10-21 PROCEDURE — 71045 X-RAY EXAM CHEST 1 VIEW: CPT

## 2021-10-21 PROCEDURE — 6360000002 HC RX W HCPCS: Performed by: EMERGENCY MEDICINE

## 2021-10-21 PROCEDURE — 6370000000 HC RX 637 (ALT 250 FOR IP): Performed by: INTERNAL MEDICINE

## 2021-10-21 PROCEDURE — 82947 ASSAY GLUCOSE BLOOD QUANT: CPT

## 2021-10-21 PROCEDURE — 99235 HOSP IP/OBS SAME DATE MOD 70: CPT | Performed by: INTERNAL MEDICINE

## 2021-10-21 PROCEDURE — 99284 EMERGENCY DEPT VISIT MOD MDM: CPT

## 2021-10-21 PROCEDURE — 84295 ASSAY OF SERUM SODIUM: CPT

## 2021-10-21 PROCEDURE — 85730 THROMBOPLASTIN TIME PARTIAL: CPT

## 2021-10-21 PROCEDURE — 99153 MOD SED SAME PHYS/QHP EA: CPT

## 2021-10-21 PROCEDURE — 2580000003 HC RX 258: Performed by: INTERNAL MEDICINE

## 2021-10-21 PROCEDURE — C1894 INTRO/SHEATH, NON-LASER: HCPCS

## 2021-10-21 PROCEDURE — 85027 COMPLETE CBC AUTOMATED: CPT

## 2021-10-21 PROCEDURE — C1725 CATH, TRANSLUMIN NON-LASER: HCPCS

## 2021-10-21 PROCEDURE — 93356 MYOCRD STRAIN IMG SPCKL TRCK: CPT

## 2021-10-21 PROCEDURE — 2500000003 HC RX 250 WO HCPCS

## 2021-10-21 PROCEDURE — 92941 PRQ TRLML REVSC TOT OCCL AMI: CPT

## 2021-10-21 PROCEDURE — 2709999900 HC NON-CHARGEABLE SUPPLY

## 2021-10-21 PROCEDURE — 96374 THER/PROPH/DIAG INJ IV PUSH: CPT

## 2021-10-21 PROCEDURE — 84132 ASSAY OF SERUM POTASSIUM: CPT

## 2021-10-21 PROCEDURE — 80061 LIPID PANEL: CPT

## 2021-10-21 PROCEDURE — 2000000000 HC ICU R&B

## 2021-10-21 PROCEDURE — 93458 L HRT ARTERY/VENTRICLE ANGIO: CPT

## 2021-10-21 PROCEDURE — C1887 CATHETER, GUIDING: HCPCS

## 2021-10-21 PROCEDURE — 6360000002 HC RX W HCPCS

## 2021-10-21 PROCEDURE — 83880 ASSAY OF NATRIURETIC PEPTIDE: CPT

## 2021-10-21 RX ORDER — CLOPIDOGREL 300 MG/1
300 TABLET, FILM COATED ORAL ONCE
Status: COMPLETED | OUTPATIENT
Start: 2021-10-21 | End: 2021-10-21

## 2021-10-21 RX ORDER — HEPARIN SODIUM 1000 [USP'U]/ML
4000 INJECTION, SOLUTION INTRAVENOUS; SUBCUTANEOUS ONCE
Status: COMPLETED | OUTPATIENT
Start: 2021-10-21 | End: 2021-10-21

## 2021-10-21 RX ORDER — ACETAMINOPHEN 325 MG/1
650 TABLET ORAL EVERY 4 HOURS PRN
Status: DISCONTINUED | OUTPATIENT
Start: 2021-10-21 | End: 2021-10-21 | Stop reason: HOSPADM

## 2021-10-21 RX ORDER — ATORVASTATIN CALCIUM 80 MG/1
80 TABLET, FILM COATED ORAL NIGHTLY
Status: DISCONTINUED | OUTPATIENT
Start: 2021-10-21 | End: 2021-10-21 | Stop reason: HOSPADM

## 2021-10-21 RX ORDER — ONDANSETRON 2 MG/ML
4 INJECTION INTRAMUSCULAR; INTRAVENOUS EVERY 6 HOURS PRN
Status: DISCONTINUED | OUTPATIENT
Start: 2021-10-21 | End: 2021-10-21 | Stop reason: HOSPADM

## 2021-10-21 RX ORDER — HEPARIN SODIUM 1000 [USP'U]/ML
4000 INJECTION, SOLUTION INTRAVENOUS; SUBCUTANEOUS PRN
Status: DISCONTINUED | OUTPATIENT
Start: 2021-10-21 | End: 2021-10-21 | Stop reason: HOSPADM

## 2021-10-21 RX ORDER — ASPIRIN 81 MG/1
81 TABLET ORAL DAILY
Status: DISCONTINUED | OUTPATIENT
Start: 2021-10-22 | End: 2021-10-21 | Stop reason: HOSPADM

## 2021-10-21 RX ORDER — HYDRALAZINE HYDROCHLORIDE 20 MG/ML
10 INJECTION INTRAMUSCULAR; INTRAVENOUS EVERY 10 MIN PRN
Status: DISCONTINUED | OUTPATIENT
Start: 2021-10-21 | End: 2021-10-21 | Stop reason: HOSPADM

## 2021-10-21 RX ORDER — SODIUM CHLORIDE 9 MG/ML
25 INJECTION, SOLUTION INTRAVENOUS PRN
Status: DISCONTINUED | OUTPATIENT
Start: 2021-10-21 | End: 2021-10-21 | Stop reason: HOSPADM

## 2021-10-21 RX ORDER — LISINOPRIL 5 MG/1
5 TABLET ORAL DAILY
Status: DISCONTINUED | OUTPATIENT
Start: 2021-10-21 | End: 2021-10-21 | Stop reason: HOSPADM

## 2021-10-21 RX ORDER — SODIUM CHLORIDE 0.9 % (FLUSH) 0.9 %
5-40 SYRINGE (ML) INJECTION PRN
Status: DISCONTINUED | OUTPATIENT
Start: 2021-10-21 | End: 2021-10-21 | Stop reason: HOSPADM

## 2021-10-21 RX ORDER — HEPARIN SODIUM 10000 [USP'U]/100ML
5-30 INJECTION, SOLUTION INTRAVENOUS CONTINUOUS
Status: DISCONTINUED | OUTPATIENT
Start: 2021-10-21 | End: 2021-10-21 | Stop reason: HOSPADM

## 2021-10-21 RX ORDER — SODIUM CHLORIDE 9 MG/ML
1000 INJECTION, SOLUTION INTRAVENOUS CONTINUOUS
Status: DISCONTINUED | OUTPATIENT
Start: 2021-10-21 | End: 2021-10-21 | Stop reason: HOSPADM

## 2021-10-21 RX ORDER — SODIUM CHLORIDE 0.9 % (FLUSH) 0.9 %
5-40 SYRINGE (ML) INJECTION EVERY 12 HOURS SCHEDULED
Status: DISCONTINUED | OUTPATIENT
Start: 2021-10-21 | End: 2021-10-21 | Stop reason: HOSPADM

## 2021-10-21 RX ORDER — CLOPIDOGREL BISULFATE 75 MG/1
75 TABLET ORAL DAILY
Status: DISCONTINUED | OUTPATIENT
Start: 2021-10-22 | End: 2021-10-21 | Stop reason: HOSPADM

## 2021-10-21 RX ORDER — HEPARIN SODIUM 1000 [USP'U]/ML
2000 INJECTION, SOLUTION INTRAVENOUS; SUBCUTANEOUS PRN
Status: DISCONTINUED | OUTPATIENT
Start: 2021-10-21 | End: 2021-10-21 | Stop reason: HOSPADM

## 2021-10-21 RX ORDER — SPIRONOLACTONE 25 MG/1
25 TABLET ORAL DAILY
Status: DISCONTINUED | OUTPATIENT
Start: 2021-10-21 | End: 2021-10-21 | Stop reason: HOSPADM

## 2021-10-21 RX ADMIN — METOPROLOL TARTRATE 12.5 MG: 25 TABLET, FILM COATED ORAL at 12:30

## 2021-10-21 RX ADMIN — HEPARIN SODIUM 12 UNITS/KG/HR: 10000 INJECTION, SOLUTION INTRAVENOUS at 04:07

## 2021-10-21 RX ADMIN — LISINOPRIL 5 MG: 5 TABLET ORAL at 12:31

## 2021-10-21 RX ADMIN — CLOPIDOGREL BISULFATE 300 MG: 300 TABLET, FILM COATED ORAL at 02:22

## 2021-10-21 RX ADMIN — IOPAMIDOL 170 ML: 612 INJECTION, SOLUTION INTRAVENOUS at 03:11

## 2021-10-21 RX ADMIN — SODIUM CHLORIDE 1000 ML: 9 INJECTION, SOLUTION INTRAVENOUS at 04:12

## 2021-10-21 RX ADMIN — HEPARIN SODIUM 4000 UNITS: 1000 INJECTION, SOLUTION INTRAVENOUS; SUBCUTANEOUS at 02:20

## 2021-10-21 RX ADMIN — CLOPIDOGREL BISULFATE 300 MG: 300 TABLET, FILM COATED ORAL at 02:20

## 2021-10-21 RX ADMIN — PERFLUTREN 1.65 MG: 6.52 INJECTION, SUSPENSION INTRAVENOUS at 10:50

## 2021-10-21 ASSESSMENT — ENCOUNTER SYMPTOMS
TROUBLE SWALLOWING: 0
ABDOMINAL DISTENTION: 0
EYE PAIN: 0
NAUSEA: 0
DIARRHEA: 0
CHEST TIGHTNESS: 1
BACK PAIN: 0
VOMITING: 0
COUGH: 0
SHORTNESS OF BREATH: 0
CHEST TIGHTNESS: 0
VOICE CHANGE: 0
PHOTOPHOBIA: 0
SORE THROAT: 0
SHORTNESS OF BREATH: 1
COLOR CHANGE: 0
WHEEZING: 0
CONSTIPATION: 0
GASTROINTESTINAL NEGATIVE: 1
EYE REDNESS: 0
ABDOMINAL PAIN: 0
SINUS PRESSURE: 0
EYES NEGATIVE: 1
RESPIRATORY NEGATIVE: 1

## 2021-10-21 NOTE — LETTER
[facility]  Cardiac Rehab Department  [address] [place] [address] Rc   [phone]  Toll Free [phone]              October 22, 2021    Dear Darren Bateman,    In follow-up to your recent hospitalization, please find this informational packet that has been sent to you on heart disease and the guidelines you are to follow concerning your present cardiac condition and immediate recovery. Due to your cardiac diagnosis and intervention you now qualify for participation in a Phase II Outpatient Cardiac Rehab Program.  This elective service has been shown to significantly reduce cardiac mortality by 26-31% among patients such as yourself! A brochure has been included in this mailing for the purpose of providing you with a brief overview of program components. Simply contact [facility] at [phone] or the hospital nearest your residence to inquire about program specifics and the opportunity to enroll. Feel free to reach out to us now or at any other time and our staff will be more than pleased to assist you. Thank you. To the betterment of your health,        [facility]    Krishan Barrow, BS, MA Mayela Ortega, MAYUR  Registered Nurse  Exercise Physiologist  Registered Nurse

## 2021-10-21 NOTE — Clinical Note
Cardiac Rehab MI/PTCA/Stent education packet was sent to the patient's address on record. Handouts included were titled; \"Home Instructions Following a Cardiac Event\", \"Cardiac Home Exercise Program - Phase I\", \"Risk Factors for Heart Disease and Stroke\" and \"Cardiac Diet/Low Cholesterol\". Patient was instructed to contact [facility] or the hospital nearest their residence for the opportunity to enroll in Phase II Outpatient Ca rdiac Rehab.

## 2021-10-21 NOTE — PROGRESS NOTES
Cardiology discussed case with Dr. Bird Carrillo at family's request in [place] he is agreeable to transfer and will contact us as soon as bed available anticipate transfer by helicopter. Family aware of risk of transfer.

## 2021-10-21 NOTE — PROGRESS NOTES
Pt is being transferred to [facility]. Report has been called to [address] [place] [address],[address] at [phone]. Family has been notified of pt's bed location. [facility] is here to  the pt. Vitals are stable. Pt transferring with a Heparin gtt at 12 u/kg/hr and Nitro gtt at 5 mcg/min. Pt has not reported any chest pain since last night. At time of transfer pt was in total agreement with the transfer; transfer paperwork was signed.

## 2021-10-21 NOTE — CONSULTS
**Physician Signature**  This document was electronically signed by: Petey Sol MD  10/21/2021   11:31 AM    **Consult Information**  Member Facility: [address] [place] [address] MRN: [id number]  Visit/Encounter Number: [id number]  Consult ID: [id number]  Facility Time Zone: CT  Date and Time of Request: 10/21/2021 10:42 AM  CT  Requesting Clinician: DR MONTEIRO  Patient Name: Connie Colvin  YOB: 1955  Gender: Male  Patient identity was confirmed at the beginning of the consult with the   patient/family/staff using two personal identifiers: Patient name and       **Reason for Consult**  Reason for Consult: [facility]    **Admission**  Admission Date: 10-  Chief reason for ICU admission: Angina    **Core Metrics**  General orienting sentence for patient: 821 AM: 77year old man with   unstable angina, gtt nitro, gtt heparin, trying to get transferred to   [place] for high risk cardiac surgery  Chief physiologic deterioration: None - Stable patient  Is the patient on DVT prophylaxis?: Yes  Prophylaxis type: Pharmacological  DVT Prophylaxis Comments: Gtt heparin  Is the patient on GI prophylaxis?: Not Indicated  Has this patient reached their nutritional goal?: No and issues are not being   addressed  Are there current issues with pain management in this patient?:   No  Are there issues with skin integrity?: No  Are there issues with delirium?: No  Has the patient been mobilized?: No  Is this patient currently intubated?: No  Are there ethical or care philosophy or family issues?: No  Do you recommend an in depth evaluation?: No  Disposition Recommendations: Transition/transfer to a higher level of   care    **Physician Signature**  This document was electronically signed by: Petey Sol MD  10/21/2021   11:31 AM

## 2021-10-21 NOTE — ED PROVIDER NOTES
[facility] EMERGENCY DEPT  EMERGENCY DEPARTMENT ENCOUNTER      Pt Name: Kait Eubanks  MRN: [id number]  [place] 1955  Date of evaluation: 10/21/2021  Provider: Preeti Rodriguez MD    [address] [place] [address]       Chief Complaint   Patient presents with    Chest Pain     Pt woke up with chest pain described as tightness, diaphoretic. Pt denies chest pain at this time, pt took 325 aspirin at home and 1 SL nitro that was his wifes. HISTORY OF PRESENT ILLNESS   (Location/Symptom, Timing/Onset,Context/Setting, Quality, Duration, Modifying Factors, Severity)  Note limiting factors. Kait Eubanks is a 77 y.o. male who presents to the emergency department for chest pain. Patient reports that he woke up around 0100 with sharp chest pain. The pain was in the center of his chest without radiation. He reports getting very diaphoretic. He did not have any nausea or vomiting associated with it. He did not have any shortness of breath. Prior to EMS arrival patient took 325 of ASA. He was also given 1 sublingual nitro by his wife. This was her medication. Patient denies a history of any prior CAD. Prior to his arrival here in the ED, EMS transmitted a twelve-lead which showed a anterior lateral STEMI. At [place] [address] I contacted cardiology and sent him the twelve-lead that EMS transmitted. I informed the cardiologist that they were about 20 minutes out. He then wanted me to call the Cath Lab and based on that twelve-lead. On patient's arrival he had a continued anterior lateral STEMI. Patient was started on Plavix and heparin here in the ED. He will be transferred to the Cath Lab. HPI    NursingNotes were reviewed. REVIEW OF SYSTEMS    (2-9 systems for level 4, 10 or more for level 5)     Review of Systems   Constitutional: Positive for diaphoresis. Negative for activity change, fatigue and fever. HENT: Negative for dental problem, ear pain, sinus pressure, sore throat and trouble swallowing.     Eyes: Negative capsule by mouth daily    PROPRANOLOL (INDERAL LA) 120 MG EXTENDED RELEASE CAPSULE    Take 1 capsule by mouth daily    VENTOLIN  (90 BASE) MCG/ACT INHALER    Inhale 2 puffs into the lungs every 6 hours as needed for Wheezing       ALLERGIES     Patient has no known allergies. FAMILY HISTORY     History reviewed. No pertinent family history. SOCIAL HISTORY       Social History     Socioeconomic History    Marital status:      Spouse name: None    Number of children: None    Years of education: None    Highest education level: None   Occupational History    None   Tobacco Use    Smoking status: Current Every Day Smoker     Packs/day: 2.00     Years: 48.00     Pack years: 96.00     Types: Cigarettes     Start date: 7/20/1970    Smokeless tobacco: Former User   Substance and Sexual Activity    Alcohol use: No    Drug use: No    Sexual activity: None   Other Topics Concern    None   Social History Narrative    None     Social Determinants of Health     Financial Resource Strain:     Difficulty of Paying Living Expenses:    Food Insecurity:     Worried About Running Out of Food in the Last Year:     Ran Out of Food in the Last Year:    Transportation Needs:     Lack of Transportation (Medical):      Lack of Transportation (Non-Medical):    Physical Activity:     Days of Exercise per Week:     Minutes of Exercise per Session:    Stress:     Feeling of Stress :    Social Connections:     Frequency of Communication with Friends and Family:     Frequency of Social Gatherings with Friends and Family:     Attends Nondenominational Services:     Active Member of Clubs or Organizations:     Attends Club or Organization Meetings:     Marital Status:    Intimate Partner Violence:     Fear of Current or Ex-Partner:     Emotionally Abused:     Physically Abused:     Sexually Abused:        SCREENINGS             PHYSICAL EXAM    (up to 7 for level 4, 8 or more for level 5)     ED Triage Vitals   BP Temp Temp src Pulse Resp SpO2 Height Weight   -- -- -- -- -- -- -- --       Physical Exam  Vitals and nursing note reviewed. Constitutional:       Appearance: Normal appearance. He is not ill-appearing. HENT:      Head: Normocephalic and atraumatic. Nose: Nose normal.      Mouth/Throat:      Mouth: Mucous membranes are moist.      Pharynx: Oropharynx is clear. Eyes:      Extraocular Movements: Extraocular movements intact. Pupils: Pupils are equal, round, and reactive to light. Cardiovascular:      Rate and Rhythm: Regular rhythm. Tachycardia present. Heart sounds: No murmur heard. Pulmonary:      Effort: Pulmonary effort is normal.      Breath sounds: Examination of the right-upper field reveals wheezing. Examination of the left-upper field reveals wheezing. Examination of the right-lower field reveals wheezing. Examination of the left-lower field reveals wheezing. Wheezing present. No decreased breath sounds, rhonchi or rales. Abdominal:      General: Abdomen is flat. There is no distension. Palpations: Abdomen is soft. Tenderness: There is no abdominal tenderness. There is no guarding. Musculoskeletal:         General: No swelling or tenderness. Normal range of motion. Cervical back: Normal range of motion and neck supple. No tenderness. Skin:     General: Skin is warm and dry. Capillary Refill: Capillary refill takes less than 2 seconds. Neurological:      General: No focal deficit present. Mental Status: He is alert and oriented to person, place, and time. Cranial Nerves: No cranial nerve deficit. Psychiatric:         Mood and Affect: Mood normal.         Behavior: Behavior normal.         Thought Content:  Thought content normal.         DIAGNOSTIC RESULTS     EKG: All EKG's areinterpreted by the Emergency Department Physician who either signs or Co-signs this chart in the absence of a cardiologist.    0207: Sinus tachycardia with a rate of 126, normal axis, anterior lateral STEMI in leads V2 through V4. There are reciprocal changes in leads II, III and aVF. RADIOLOGY:  Non-plain film images such as CT, Ultrasound and MRI are read by the radiologist. Plain radiographic images are visualized and preliminarily interpreted bythe emergency physician with the below findings:    CXR: Chronic lung changes with no acute process. XR CHEST PORTABLE    (Results Pending)           LABS:  Labs Reviewed   CBC - Abnormal; Notable for the following components:       Result Value    WBC 11.5 (*)     MCV 98.0 (*)     MCH 31.2 (*)     MCHC 31.9 (*)     All other components within normal limits   APTT - Abnormal; Notable for the following components:    aPTT 25.8 (*)     All other components within normal limits   COMPREHENSIVE METABOLIC PANEL - Abnormal; Notable for the following components:    CO2 30 (*)     Glucose 118 (*)     All other components within normal limits   TROPONIN - Abnormal; Notable for the following components:    Troponin 0.09 (*)     All other components within normal limits   COVID-19, RAPID   APTT   APTT       All other labs were within normal range or not returned as of this dictation. EMERGENCY DEPARTMENT COURSE and DIFFERENTIAL DIAGNOSIS/MDM:   Vitals:    Vitals:    10/21/21 0210   BP: (!) 137/103   Pulse: 114   Resp: 17   Temp: 97.8 °F (36.6 °C)   TempSrc: Oral   SpO2: 96%   Weight: 174 lb (78.9 kg)   Height: 5' 10\" (1.778 m)       MDM  Number of Diagnoses or Management Options  ST elevation myocardial infarction (STEMI) involving other coronary artery of anterior wall [facility]): new and requires workup  Diagnosis management comments: Patient presented for chest pain. He was found to have a anterior lateral STEMI. Cardiology was contacted. Cath Lab was contacted. Patient was treated with heparin and Plavix here in the ED. He was transferred over to Cath Lab in 17 minutes from the time he arrived.     Patient Progress  Patient progress: stable      Reassessment      Medications   heparin (porcine) injection 4,000 Units (has no administration in time range)   heparin (porcine) injection 2,000 Units (has no administration in time range)   heparin 25,000 units in dextrose 5% 250 mL (premix) infusion (has no administration in time range)   heparin (porcine) injection 4,000 Units (4,000 Units IntraVENous Given 10/21/21 0220)   clopidogrel (PLAVIX) tablet 300 mg (300 mg Oral Given 10/21/21 0220)   clopidogrel (PLAVIX) tablet 300 mg (300 mg Oral Given 10/21/21 0222)       CONSULTS:  IP CONSULT TO CARDIOLOGY:  Unless otherwise noted below, none     Procedures    FINAL IMPRESSION      1. ST elevation myocardial infarction (STEMI) involving other coronary artery of anterior wall ([address]          DISPOSITION/PLAN   DISPOSITION Decision To Admit 10/21/2021 02:20:44 AM      PATIENT REFERRED TO:  No follow-up provider specified.     DISCHARGE MEDICATIONS:  New Prescriptions    No medications on file          (Please note that portions of this note were completed with a voice recognition program.  Efforts were made to edit thedictations but occasionally words are mis-transcribed.)    Sobia Mcintyre MD (electronically signed)Emergency Physician         Quinn Albarran MD  10/21/21 4369

## 2021-10-21 NOTE — CONSULTS
Hospital Medicine Consult    Patient:  Juno Reyes  MRN: [id number]    Consulting Physician: Cleopatra Salvador MD  Reason for Consult: Medical Management  Primacy Care Physician: CAPO Champagne    HISTORY OF PRESENT ILLNESS:   The patient is a 77 y.o. male presents with persistent precordial chest pain. He states that he has been having chest discomfort and shortness of breath for several months now that is exacerbated with activity. He was given an inhaler by his primary provider with no improvement. He had a recent stress test which was abnormal.  He was scheduled to have a outpatient cardiac catheterization however this was delayed due to insurance issues. He was shown to have EKG changes last evening consistent with an acute ST segment elevation MI. He went emergently to the Cath Lab. He underwent stent placement to a 98% proximal LAD lesion. He is to be evaluated by CT surgery for possible bypass. We have been asked to see him for inpatient medical management. Past Medical History:        Diagnosis Date    Cancer ([address]     Headache     Hypertension        Past Surgical History:        Procedure Laterality Date    NOSE SURGERY  09/2016       Medications: Scheduled Meds:   sodium chloride flush  5-40 mL IntraVENous 2 times per day    atorvastatin  80 mg Oral Nightly    [START ON 10/22/2021] aspirin  81 mg Oral Daily    lisinopril  5 mg Oral Daily    spironolactone  25 mg Oral Daily    clopidogrel  75 mg Oral Daily     Continuous Infusions:   heparin (PORCINE) Infusion 12 Units/kg/hr (10/21/21 0407)    sodium chloride 1,000 mL (10/21/21 0412)    sodium chloride       PRN Meds:.heparin (porcine), heparin (porcine), sodium chloride flush, sodium chloride, acetaminophen, ondansetron, hydrALAZINE    Allergies:  Patient has no known allergies. Social History:   TOBACCO:   reports that he has been smoking cigarettes. He started smoking about 51 years ago.  He has a 96.00 pack-year smoking history. He has quit using smokeless tobacco.  ETOH:   reports no history of alcohol use. Family History:       Problem Relation Age of Onset    Cancer Mother     Coronary Art Dis Father     Coronary Art Dis Brother        Review of Systems   Constitutional: Negative for activity change and fever. HENT: Negative for congestion and voice change. Eyes: Negative for visual disturbance. Respiratory: Positive for chest tightness and shortness of breath. Cardiovascular: Positive for chest pain. Negative for leg swelling. Gastrointestinal: Negative for constipation, diarrhea, nausea and vomiting. Endocrine: Negative for polyuria. Genitourinary: Negative for difficulty urinating and dysuria. Musculoskeletal: Negative for back pain and neck pain. Skin: Negative for color change. Allergic/Immunologic: Negative for immunocompromised state. Neurological: Negative for dizziness and light-headedness. Psychiatric/Behavioral: The patient is not nervous/anxious. Physical Exam:    Vitals: /64   Pulse 107   Temp 97.9 °F (36.6 °C)   Resp 18   Ht 5' 10\" (1.778 m)   Wt 182 lb 12.8 oz (82.9 kg)   SpO2 97%   BMI 26.23 kg/m²     Physical Exam  Vitals and nursing note reviewed. HENT:      Head: Normocephalic and atraumatic. Right Ear: External ear normal.      Left Ear: External ear normal.      Nose: Nose normal.      Mouth/Throat:      Mouth: Mucous membranes are moist.   Eyes:      Conjunctiva/sclera: Conjunctivae normal.      Pupils: Pupils are equal, round, and reactive to light. Cardiovascular:      Rate and Rhythm: Normal rate and regular rhythm. Heart sounds: Normal heart sounds. Pulmonary:      Effort: Pulmonary effort is normal.      Breath sounds: Normal breath sounds. Abdominal:      General: Abdomen is flat. Palpations: Abdomen is soft. Musculoskeletal:         General: No swelling. Cervical back: Neck supple. No rigidity.    Skin: General: Skin is warm and dry. Neurological:      Mental Status: He is oriented to person, place, and time. Psychiatric:         Mood and Affect: Mood normal.         Thought Content: Thought content normal.         CBC:   Recent Labs     10/21/21  0212 10/21/21  0308   WBC 11.5*  --    HGB 15.4 12.9     --      BMP:    Recent Labs     10/21/21  0212 10/21/21  0308     --    K 3.7  --      --    CO2 30* 25   BUN 15  --    CREATININE 0.9 0.8   GLUCOSE 118*  --      Hepatic:   Recent Labs     10/21/21  0212   AST 20   ALT 18   BILITOT <0.2   ALKPHOS 82     Troponin:   Recent Labs     10/21/21  0212 10/21/21  0459   TROPONINI 0.09* 0.22*     BNP: No results for input(s): BNP in the last 72 hours. Lipids:   Recent Labs     10/21/21  0459   CHOL 200*   HDL 43*     ABGs:   Lab Results   Component Value Date    PHART 7.390 10/21/2021    PO2ART 67 10/21/2021    PHN3HCK 41 10/21/2021     INR: No results for input(s): INR in the last 72 hours. -----------------------------------------------------------------  ECHO Stress Test    Result Date: 10/13/2021  Stress Echocardiography Report  Demographics   Patient Name Damien Basket Date of Study         10/13/2021   MRN          [id number]          Gender                Male   Date of      1955      Room Number  Birth   Age          77 year(s)   Height:      70 inches       Referring Physician   Navarro Jean   Weight:      174 pounds      Sonographer           DESIREE Schultz   BSA:         1.97 m^2        Interpreting          Jason Bey MD                               Physician   BMI:         24.97 kg/m^2    CAPO SCANLON  Procedure Type of Study   ECHOCARDIOGRAM STRESS TEST:ECHOCARDIOGRAM STRESS TEST . Study Location: Echo Lab Technical Quality: Adequate visualization Patient Status: Outpatient Rhythm: Within normal limits HR: 81 bpm BP: 151/91 mmHg Indications:Chest pain and Dyspnea/SOB.   Conclusions 40-50%)   Global LVEF (stress): Moderately depressed (LVEF 30-40%)   ECG  Artifact seen in the EKG during stress. Arrhythmias  No rhythm abnormality. Symptoms  No chest pain. Patient was short of breath. Stress Protocol: Exercise - Sebastien +------+--------+-----+----+-----------+--------+------------+---+---------+ ! Stage ! Stage   ! Time ! Work! Speed/Grade! Heart   ! Blood       ! RPE! Comments ! !#     ! Name    !     !    ! !Rate    ! Pressure    !   !         ! +------+--------+-----+----+-----------+--------+------------+---+---------+ !1.0   ! !03:00!5.4 !1.7/10.0   !        !            !   !         ! +------+--------+-----+----+-----------+--------+------------+---+---------+ !2.0   ! !06:00!7.0 !2.5/12.0   !        !            !   !         ! +------+--------+-----+----+-----------+--------+------------+---+---------+ !3.0   ! !09:00!10.0!3.4/14.0   !        !            !   !         ! +------+--------+-----+----+-----------+--------+------------+---+---------+ !4.0   !        !12:00!13. 0!4.2/16.0   !        !            !   !         ! +------+--------+-----+----+-----------+--------+------------+---+---------+ !5.0   !        !15:00!16. 0!5.0/18.0   !        !            !   !         ! +------+--------+-----+----+-----------+--------+------------+---+---------+ ! 6.0   ! !18:00!18. 0!5.5/20.0   !        !            !   !         ! +------+--------+-----+----+-----------+--------+------------+---+---------+    XR CHEST (2 VW)    Result Date: 10/5/2021  XR CHEST (2 VW) 10/5/2021 1:11 PM History: Short of breath. Chest tightness and discomfort. Two-view chest x-ray. No comparison. Mild chronic interstitial disease. Mild hyperexpansion. Normal heart size. No pneumonia, pneumothorax, or pleural effusion. Degenerative thoracic spine changes. 1. Chronic lung changes with no acute disease.  Signed by Dr Ksenia Bradshaw    XR CHEST PORTABLE    Result Date: 10/21/2021  Exam: XR CHEST PORTABLE  Date:  10/21/2021 History:  Male, age  77 years; STEMI COMPARISON:  Chest x-ray dated October 5, 2021. Findings : Lower lung volumes, which results in coarsening of interstitium. The heart and mediastinum are normal in size. Lungs are without focal infiltrate, mass or effusions. The bones show no acute pathology. Impression: 1. Lower lung volumes, which results in coarsening of interstitium. 2.  Otherwise, no acute cardiopulmonary process. Signed by Dr Ros Hudson and Plan     ST segment elevation MI. Status post cardiac catheterization. CT surgery evaluation. Aggressive medical management. Acute LV dysfunction. Continue ACE inhibitor. Add Aldactone. Hypercholesterolemia. Intensive statin therapy. Chronic tobacco use. Encourage tobacco cessation. Thank you for allowing me to participate in the care of Mr. Hernandez. I will be following with you.       Jennifer Finn, DO

## 2021-10-21 NOTE — PROGRESS NOTES
Cardiac Cath Lab preliminary note admitted developed chest pain approximately over 100 has been having increased shortness of breath for several months recent stress test abnormal actually had been scheduled for heart cath but apparently declined by insurance. ECG showed acute anterior myocardial infarction. Brought to the Cath Lab right radial artery access obtained. Left ventricular function impaired. Coronary angiograms revealed significant ostial circumflex and ostial RCA stenosis distal posterior lateral the right coronary artery stenosis and high-grade 98% proximal LAD stenosis underwent PTCA with 2.0 mm balloon. Improvement in luminal diameter good flow distally resolution of chest pain. Also has significant downstream diagonal disease and mid distal LAD disease as well. He received a loading dose of Plavix in the emergency department. Recommend cardiovascular surgical consultation for possible bypass surgery. Will maintain on IV nitroglycerin and IV heparin further comments to follow discussed at length with family.

## 2021-10-21 NOTE — H&P
[id number] [facility]  History and Physical    Patient:  Abby Hamilton  MRN: [id number]    CHIEF COMPLAINT:    Chief Complaint   Patient presents with    Chest Pain     Pt woke up with chest pain described as tightness, diaphoretic. Pt denies chest pain at this time, pt took 325 aspirin at home and 1 SL nitro that was his wifes. History Obtained From: Patient    PCP: CAPO Bland    HISTORY OF PRESENT ILLNESS:   77 y.o. male who presents with acute chest pain awakened him at 0100. Recent stress test 10/13/2021 abnormal had actually been scheduled for cardiac cath which he has never had before. Describes increased exertional dyspnea in recent months no definite angina. No previous cardiac history documented. Chris-19 status not detected. Troponin 0.09.  proBNP 513. Lactic acid 0.9. REVIEW OF SYSTEMS:  Review of Systems   Constitutional: Negative. Negative for chills, fever and unexpected weight change. HENT: Negative. Eyes: Negative. Respiratory: Negative. Negative for shortness of breath. Cardiovascular: Negative. Negative for chest pain. Gastrointestinal: Negative. Negative for diarrhea, nausea and vomiting. Endocrine: Negative. Genitourinary: Negative. Musculoskeletal: Negative. Skin: Negative. Neurological: Negative. All other systems reviewed and are negative. Cardiac Specific Data:   Specialty Problems        Cardiology Problems    Essential hypertension              Past Medical History:      Diagnosis Date    Cancer ([facility] [address]     Headache     Hypertension        Past Surgical History:      Procedure Laterality Date    NOSE SURGERY  09/2016       Medications Prior to Admission:    Prior to Admission medications    Medication Sig Start Date End Date Taking?  Authorizing Provider   butalbital-acetaminophen-caffeine (FIORICET, ESGIC) -84 MG per tablet Take 1 tablet by mouth every 6 hours as needed for Headaches 10/5/21  Yes Agus Oglesby APRN   clonazePAM (KLONOPIN) 1 MG tablet Take 1 tablet by mouth 3 times daily as needed for Anxiety for up to 30 days. 10/5/21 11/4/21 Yes CAPO Gutierrez   nicotine (NICODERM CQ) 21 MG/24HR Place 1 patch onto the skin every 24 hours 10/5/21 10/5/22 Yes CAPO Gutierrez   VENTOLIN  (90 Base) MCG/ACT inhaler Inhale 2 puffs into the lungs every 6 hours as needed for Wheezing 9/17/21  Yes CAPO Gutierrez   omeprazole (PRILOSEC) 40 MG delayed release capsule Take 1 capsule by mouth daily 3/22/21  Yes CAPO Gutierrez   irbesartan (AVAPRO) 300 MG tablet Take 1 tablet by mouth daily 2/18/21  Yes CAPO Urbina   amLODIPine (NORVASC) 10 MG tablet Take 1 tablet by mouth daily 2/3/21  Yes CAPO Morales   amitriptyline (ELAVIL) 25 MG tablet Take 1 tablet by mouth nightly For headache prevention and sleep 2/21/20  Yes CAPO Gutierrez   cloNIDine (CATAPRES) 0.1 MG tablet Take 1 tablet by mouth every 4 hours as needed for High Blood Pressure sbp great 180 or dbp great 95 2/21/20  Yes CAPO Gutierrez   propranolol (INDERAL LA) 120 MG extended release capsule Take 1 capsule by mouth daily 2/21/20  Yes CAPO Gutierrez   Aspirin Buf,VgSos-PoJwd-FaJfr, (BUFFERED ASPIRIN) 325 MG TABS Take 325 mg by mouth daily   Yes Historical Provider, MD       Allergies:  Patient has no known allergies. Past Social History:  Social History     Socioeconomic History    Marital status:      Spouse name: Not on file    Number of children: Not on file    Years of education: Not on file    Highest education level: Not on file   Occupational History    Not on file   Tobacco Use    Smoking status: Current Every Day Smoker     Packs/day: 2.00     Years: 48.00     Pack years: 96.00     Types: Cigarettes     Start date: 7/20/1970    Smokeless tobacco: Former User   Vaping Use    Vaping Use: Never used   Substance and Sexual Activity    Alcohol use: No    Drug use:  No  Sexual activity: Not on file   Other Topics Concern    Not on file   Social History Narrative    Not on file     Social Determinants of Health     Financial Resource Strain:     Difficulty of Paying Living Expenses:    Food Insecurity:     Worried About Running Out of Food in the Last Year:     [address] in the Last Year:    Transportation Needs:     Lack of Transportation (Medical):  Lack of Transportation (Non-Medical):    Physical Activity:     Days of Exercise per Week:     Minutes of Exercise per Session:    Stress:     Feeling of Stress :    Social Connections:     Frequency of Communication with Friends and Family:     Frequency of Social Gatherings with Friends and Family:     Attends [employer] Services:     Active Member of Clubs or Organizations:     Attends Club or Organization Meetings:     Marital Status:    Intimate Partner Violence:     Fear of Current or Ex-Partner:     Emotionally Abused:     Physically Abused:     Sexually Abused:        Family History:       Problem Relation Age of Onset    Cancer Mother     Coronary Art Dis Father     Coronary Art Dis Brother            Physical Exam:    Vitals: /64   Pulse 107   Temp 97.9 °F (36.6 °C)   Resp 18   Ht 5' 10\" (1.778 m)   Wt 182 lb 12.8 oz (82.9 kg)   SpO2 97%   BMI 26.23 kg/m²   24HR INTAKE/OUTPUT:      Intake/Output Summary (Last 24 hours) at 10/21/2021 0810  Last data filed at 10/21/2021 0357  Gross per 24 hour   Intake --   Output 600 ml   Net -600 ml       Physical Exam  Vitals reviewed. Constitutional:       General: He is not in acute distress. Appearance: Normal appearance. He is well-developed and normal weight. He is not ill-appearing, toxic-appearing or diaphoretic. HENT:      Head: Normocephalic. Nose: Nose normal.      Mouth/Throat:      Mouth: Mucous membranes are moist.      Pharynx: Oropharynx is clear. Eyes:      General: No scleral icterus.      Extraocular Movements: Extraocular movements intact. Pupils: Pupils are equal, round, and reactive to light. Neck:      Vascular: No carotid bruit or JVD. Cardiovascular:      Rate and Rhythm: Normal rate and regular rhythm. Heart sounds: Normal heart sounds. No murmur heard. No friction rub. No gallop. Pulmonary:      Effort: Pulmonary effort is normal. No respiratory distress. Breath sounds: Normal breath sounds. No stridor. No wheezing, rhonchi or rales. Chest:      Chest wall: No tenderness. Abdominal:      General: Abdomen is flat. Bowel sounds are normal. There is no distension. Palpations: Abdomen is soft. There is no mass. Tenderness: There is no abdominal tenderness. There is no right CVA tenderness, left CVA tenderness, guarding or rebound. Hernia: No hernia is present. Musculoskeletal:         General: No swelling, tenderness, deformity or signs of injury. Cervical back: Normal range of motion and neck supple. No rigidity or tenderness. Right lower leg: No edema. Left lower leg: No edema. Lymphadenopathy:      Cervical: No cervical adenopathy. Skin:     General: Skin is warm and dry. Neurological:      General: No focal deficit present. Mental Status: He is alert. Mental status is at baseline. Cranial Nerves: No cranial nerve deficit. Sensory: No sensory deficit. Motor: No weakness. Coordination: Coordination normal.   Psychiatric:         Mood and Affect: Mood normal.         Thought Content:  Thought content normal.         Judgment: Judgment normal.         LAB DATA:  CBC:   Recent Labs     10/21/21  0212 10/21/21  0308   WBC 11.5*  --    HGB 15.4 12.9     --      BMP:    Recent Labs     10/21/21  0212 10/21/21  0308     --    K 3.7  --      --    CO2 30* 25   BUN 15  --    CREATININE 0.9 0.8   GLUCOSE 118*  --      Hepatic:   Recent Labs     10/21/21  0212   AST 20   ALT 18   BILITOT <0.2   ALKPHOS 82     CK, CKMB, Troponin: @LABRCNT (CKTOTAL:3, CKMB:3, TROPONINI:3)@  Pro-BNP: No results for input(s): BNP in the last 72 hours. Lipids:   Recent Labs     10/21/21  0459   CHOL 200*   HDL 43*     ABGs:   Recent Labs     10/21/21  0308   PHART 7.390   IUW3EEF 41   PO2ART 67*   BEART 0   P3DDKGFD 93     INR: No results for input(s): INR in the last 72 hours. A1c:Invalid input(s): HEMOGLOBIN A1C  URINALYSIS:   -----------------------------------------------------------------  IMAGING:    ECHO Stress Test    Result Date: 10/13/2021  Stress Echocardiography Report  Demographics   Patient Name Trinity Durand Date of Study         10/13/2021   MRN          [id number]          Gender                Male   Date of      1955      Room Number  Birth   Age          77 year(s)   Height:      70 inches       Referring Physician   Nona Silva   Weight:      174 pounds      Sonographer           Kenyon Man MARLON Rudd   BSA:         1.97 m^2        Interpreting          Bjorn Simon MD                               Physician   BMI:         24.97 kg/m^2    APRN                  Vignesh SCANLON  Procedure Type of Study   ECHOCARDIOGRAM STRESS TEST:ECHOCARDIOGRAM STRESS TEST . Study Location: Echo Lab Technical Quality: Adequate visualization Patient Status: Outpatient Rhythm: Within normal limits HR: 81 bpm BP: 151/91 mmHg Indications:Chest pain and Dyspnea/SOB. Conclusions   Summary  Strongly positive stress echocardiogram with clinical, ECG,  echocardiographic evidence of myocardial ischemia. Patient did not have any  chest pain but was short of breath with low level of exercise. There was 2  mm ST segment depression in inferior leads with ST elevation in aVR, aVL,  and septal leads. Apical anterior wall motion abnormality which worsened  with exercise. Test was supervised by Dr. Garett Palencia. Addendum:  Recommend heart cath as soon as possible.    Recommendation  Clinical correlation is advised consideration for a heart catheterization. Signature   ----------------------------------------------------------------  Electronically signed by Osvaldo Sosa MD(Interpreting  physician) on 10/13/2021 06:26 PM  ----------------------------------------------------------------   Rest   ECG  Normal sinus rhythm. Standing HR:99 bpm   Stress   Stress Type: Exercise   Peak HR: 148 bpm                HR Response: See below  Peak BP: 187/104 mmHg           BP Response: Resting hypertension -  Predicted HR: 154 bpm           exaggerated response  % of predicted HR: 96           HR BP Product: 20753  Test Duration: 1:50 min         Max Exercise: 4.6 METS  Reason for Termination: Target  heart rate                      Exercise Effort: Poor   Stress Interpretation   No chest pain with exercise. Patient was short of breath. Poor exercise tolerance. No rhythm abnormality. Resting hypertension with hypertensive response to exercise. Target heart  rate obtained within 1 minute of exercise. Significant 2 mm ST T wave  depression in inferior leads with ST elevation in aVR, aVL, and septal  leads with exercise. EKG portion is positive for ischemia by diagnostic  criteria. Resting apical anterior wall motion abnormality. Following exercise, there  was noted to be decrease in contractility with echocardiographic evidence  of myocardial ischemia. Results   Global LVEF (rest): Mildly depressed (LVEF 40-50%)   Global LVEF (stress): Moderately depressed (LVEF 30-40%)   ECG  Artifact seen in the EKG during stress. Arrhythmias  No rhythm abnormality. Symptoms  No chest pain. Patient was short of breath. Stress Protocol: Exercise - Sebastien +------+--------+-----+----+-----------+--------+------------+---+---------+ ! Stage ! Stage   ! Time ! Work! Speed/Grade! Heart   ! Blood       ! RPE! Comments ! !#     ! Name    !     !    ! !Rate    ! Pressure    !   !         ! +------+--------+-----+----+-----------+--------+------------+---+---------+ ! 1.0 !        !03:00!5.4 !1.7/10.0   !        !            !   !         ! +------+--------+-----+----+-----------+--------+------------+---+---------+ !2.0   ! !06:00!7.0 !2.5/12.0   !        !            !   !         ! +------+--------+-----+----+-----------+--------+------------+---+---------+ !3.0   ! !09:00!10.0!3.4/14.0   !        !            !   !         ! +------+--------+-----+----+-----------+--------+------------+---+---------+ !4.0   !        !12:00!13. 0!4.2/16.0   !        !            !   !         ! +------+--------+-----+----+-----------+--------+------------+---+---------+ !5.0   !        !15:00!16. 0!5.0/18.0   !        !            !   !         ! +------+--------+-----+----+-----------+--------+------------+---+---------+ ! 6.0   ! !18:00!18. 0!5.5/20.0   !        !            !   !         ! +------+--------+-----+----+-----------+--------+------------+---+---------+    XR CHEST (2 VW)    Result Date: 10/5/2021  XR CHEST (2 VW) 10/5/2021 1:11 PM History: Short of breath. Chest tightness and discomfort. Two-view chest x-ray. No comparison. Mild chronic interstitial disease. Mild hyperexpansion. Normal heart size. No pneumonia, pneumothorax, or pleural effusion. Degenerative thoracic spine changes. 1. Chronic lung changes with no acute disease. Signed by Dr Mahala Kanner    XR CHEST PORTABLE    Result Date: 10/21/2021  Exam:   XR CHEST PORTABLE  Date:  10/21/2021 History:  Male, age  77 years; STEMI COMPARISON:  Chest x-ray dated October 5, 2021. Findings : Lower lung volumes, which results in coarsening of interstitium. The heart and mediastinum are normal in size. Lungs are without focal infiltrate, mass or effusions. The bones show no acute pathology. Impression: 1. Lower lung volumes, which results in coarsening of interstitium. 2.  Otherwise, no acute cardiopulmonary process. Signed by Dr Doron Olmos        Assessment:    1.  Acute anteroseptal myocardial infarction  2. History tobacco abuse ongoing 2 packs/day  3. Generalized anxiety disorder  4. Hypertension  5. Chronic headaches  6. Stress echo 10/13/2021 abnormal  7. 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 Proceed with emergent cardiac catheterization advise indication alternatives benefits and risk patient agreeable plan immediately. I have discussed with the patient regarding indications for the proposed procedure LEFT HEART CATHETERIZATION AND POSSIBLE PERCUTANEOUS INTERVENTION  along with possible alternatives benefits and risks including but not limited to risks of death, myocardial infarction, stroke, contrast induced nephropathy which in some cases may lead to acute kidney failure requiring dialysis, allergic reactions, bleeding requiring blood transfusion,  cardiac arrhthymias, respiratory failure which may require placing the patient on respiratory support such as a ventilator or breathing machine,risk of complications which may require vascular surgery, and if coronary intervention is performed emergency CABG may be required in less than 1% of cases. The patient is awake and alert and understands the issues involved and indicates willingness to proceed as ordered. The patient does not have any contraindications to dual antiplatelet therapy. The patient does not have any known  pending surgical procedures in the next 12 months at this time. The patient is  a reasonable candidate for moderate conscious sedation.     ASA score:  ASA 4 - Patient with severe systemic disease that is a constant threat to life    Mallampati: I (soft palate, uvula, fauces, tonsillar pillars visible)    Preferred vascular access site will be: right radial artery          Krupa Watt MD, MD 10/21/2021 @ [facility]@

## 2021-10-21 NOTE — ED NOTES
Bed: 07  Expected date:   Expected time:   Means of arrival:   Comments:  EMS: stemi     Bernadette Nash RN  10/21/21 9208

## 2021-10-22 LAB
EKG P AXIS: 66 DEGREES
EKG P AXIS: 75 DEGREES
EKG P-R INTERVAL: 160 MS
EKG P-R INTERVAL: 168 MS
EKG Q-T INTERVAL: 272 MS
EKG Q-T INTERVAL: 322 MS
EKG QRS DURATION: 110 MS
EKG QRS DURATION: 96 MS
EKG QTC CALCULATION (BAZETT): 387 MS
EKG QTC CALCULATION (BAZETT): 400 MS
EKG T AXIS: -114 DEGREES
EKG T AXIS: 69 DEGREES

## 2021-10-22 PROCEDURE — 93010 ELECTROCARDIOGRAM REPORT: CPT | Performed by: INTERNAL MEDICINE

## 2021-10-22 NOTE — CONSULTS
Cardiac Rehab MI/PTCA/Stent education packet was sent to the patient's address on record. Handouts included were titled; \"Home Instructions Following a Cardiac Event\", \"Cardiac Home Exercise Program - Phase I\", \"Risk Factors for Heart Disease and Stroke\" and \"Cardiac Diet/Low Cholesterol\". Patient was instructed to contact [facility] or the hospital nearest their residence for the opportunity to enroll in Phase II Outpatient Cardiac Rehab.

## 2021-10-28 ENCOUNTER — TELEPHONE (OUTPATIENT)
Dept: PRIMARY CARE CLINIC | Age: 66
End: 2021-10-28

## 2021-10-29 ENCOUNTER — OFFICE VISIT (OUTPATIENT)
Dept: PRIMARY CARE CLINIC | Age: 66
End: 2021-10-29
Payer: MEDICARE

## 2021-10-29 VITALS
SYSTOLIC BLOOD PRESSURE: 100 MMHG | HEART RATE: 85 BPM | TEMPERATURE: 98 F | HEIGHT: 70 IN | BODY MASS INDEX: 24.2 KG/M2 | WEIGHT: 169 LBS | OXYGEN SATURATION: 90 % | DIASTOLIC BLOOD PRESSURE: 80 MMHG

## 2021-10-29 DIAGNOSIS — I25.118 CORONARY ARTERY DISEASE OF NATIVE ARTERY OF NATIVE HEART WITH STABLE ANGINA PECTORIS (HCC): ICD-10-CM

## 2021-10-29 DIAGNOSIS — Z09 HOSPITAL DISCHARGE FOLLOW-UP: ICD-10-CM

## 2021-10-29 DIAGNOSIS — Z95.1 S/P CORONARY ARTERY BYPASS GRAFT X 5: Primary | ICD-10-CM

## 2021-10-29 PROCEDURE — 99496 TRANSJ CARE MGMT HIGH F2F 7D: CPT | Performed by: NURSE PRACTITIONER

## 2021-10-29 PROCEDURE — 1111F DSCHRG MED/CURRENT MED MERGE: CPT | Performed by: NURSE PRACTITIONER

## 2021-10-29 RX ORDER — FUROSEMIDE 40 MG/1
40 TABLET ORAL DAILY
Status: ON HOLD | COMMUNITY
End: 2022-05-23

## 2021-10-29 RX ORDER — POTASSIUM CHLORIDE 1.5 G/1.77G
20 POWDER, FOR SOLUTION ORAL 2 TIMES DAILY
COMMUNITY
End: 2022-04-29

## 2021-10-29 RX ORDER — HYDROCODONE BITARTRATE AND ACETAMINOPHEN 5; 325 MG/1; MG/1
1 TABLET ORAL EVERY 6 HOURS PRN
COMMUNITY
End: 2022-04-29

## 2021-10-29 RX ORDER — FAMOTIDINE 20 MG/1
20 TABLET, FILM COATED ORAL 2 TIMES DAILY
Qty: 60 TABLET | Refills: 5 | Status: SHIPPED | OUTPATIENT
Start: 2021-10-29 | End: 2021-11-08 | Stop reason: ALTCHOICE

## 2021-10-29 RX ORDER — ATOMOXETINE 40 MG/1
40 CAPSULE ORAL DAILY
COMMUNITY
End: 2022-05-11

## 2021-10-29 RX ORDER — CYCLOBENZAPRINE HCL 5 MG
5 TABLET ORAL 3 TIMES DAILY PRN
COMMUNITY
End: 2022-04-29

## 2021-10-29 RX ORDER — CLOPIDOGREL BISULFATE 75 MG/1
75 TABLET ORAL DAILY
Status: ON HOLD | COMMUNITY
End: 2022-05-23

## 2021-10-29 RX ORDER — ASPIRIN 81 MG/1
81 TABLET ORAL DAILY
Status: ON HOLD | COMMUNITY
End: 2022-05-23

## 2021-10-29 RX ORDER — CALCIUM CARBONATE 160(400)MG
TABLET,CHEWABLE ORAL
Qty: 1 EACH | Refills: 0 | Status: SHIPPED | OUTPATIENT
Start: 2021-10-29

## 2021-10-29 ASSESSMENT — ENCOUNTER SYMPTOMS
EYE DISCHARGE: 0
SORE THROAT: 0
RHINORRHEA: 0
CHOKING: 0
CONSTIPATION: 0
COUGH: 0
EYE REDNESS: 0
WHEEZING: 0
BLOOD IN STOOL: 0
DIARRHEA: 0

## 2021-10-29 NOTE — PROGRESS NOTES
Post-Discharge Transitional Care Management Services or Hospital Follow Up      Jennifer Silva   YOB: 1955    Date of Office Visit:  10/29/2021  Date of Hospital Admission: 10/21/21  Date of Hospital Discharge: 10/27/21    Care management risk score Rising risk (score 2-5) and Complex Care (Scores >=6): 1     Non face to face  following discharge, date last encounter closed (first attempt may have been earlier): 10/28/2021 11:15 AM     Call initiated 2 business days of discharge: Yes    Patient Active Problem List   Diagnosis    Primary insomnia    ABHINAV (generalized anxiety disorder)    Essential hypertension    Chronic tension-type headache, not intractable       No Known Allergies    Medications listed as ordered at the time of discharge from hospital       Medications marked \"taking\" at this time  Outpatient Medications Marked as Taking for the 10/29/21 encounter (Office Visit) with CAPO Nelson   Medication Sig Dispense Refill    clopidogrel (PLAVIX) 75 MG tablet Take 75 mg by mouth daily      HYDROcodone-acetaminophen (NORCO) 5-325 MG per tablet Take 1 tablet by mouth every 6 hours as needed for Pain.  cyclobenzaprine (FLEXERIL) 5 MG tablet Take 5 mg by mouth 3 times daily as needed for Muscle spasms      potassium chloride (KLOR-CON) 20 MEQ packet Take 20 mEq by mouth 2 times daily      furosemide (LASIX) 40 MG tablet Take 40 mg by mouth daily      atomoxetine (STRATTERA) 40 MG capsule Take 40 mg by mouth daily      aspirin 81 MG EC tablet Take 81 mg by mouth daily      Metoprolol Succinate 25 MG CS24 Take by mouth      Misc.  Devices (ROLLATOR ULTRA-LIGHT) MISC Use to walk 1 each 0    famotidine (PEPCID) 20 MG tablet Take 1 tablet by mouth 2 times daily For acid reflux 60 tablet 5    butalbital-acetaminophen-caffeine (FIORICET, ESGIC) -40 MG per tablet Take 1 tablet by mouth every 6 hours as needed for Headaches 120 tablet 0    clonazePAM (KLONOPIN) 1 MG tablet Take 1 tablet by mouth 3 times daily as needed for Anxiety for up to 30 days. 90 tablet 0    nicotine (NICODERM CQ) 21 MG/24HR Place 1 patch onto the skin every 24 hours 30 patch 3    VENTOLIN  (90 Base) MCG/ACT inhaler Inhale 2 puffs into the lungs every 6 hours as needed for Wheezing 18 g 0    omeprazole (PRILOSEC) 40 MG delayed release capsule Take 1 capsule by mouth daily 90 capsule 3    amitriptyline (ELAVIL) 25 MG tablet Take 1 tablet by mouth nightly For headache prevention and sleep 30 tablet 11    Aspirin Buf,GiSgo-KvGqh-AvDaa, (BUFFERED ASPIRIN) 325 MG TABS Take 325 mg by mouth daily          Medications patient taking as of now reconciled against medications ordered at time of hospital discharge: Yes    Chief Complaint   Patient presents with    Follow-Up from Hospital     5 bypass        HPI    Inpatient course: Discharge summary reviewed- see chart. Interval history/Current status: sore but is doing well. His daughter is taking care of his medications. Home health is coming out to his house once a week and he has an appointment to see Dr. Kylee Spain . They are monitoring his bp at home. Vitals:    10/29/21 0839   BP: 100/80   Site: Left Upper Arm   Position: Sitting   Cuff Size: Large Adult   Pulse: 85   Temp: 98 °F (36.7 °C)   TempSrc: Temporal   SpO2: 90%   Weight: 169 lb (76.7 kg)   Height: 5' 10\" (1.778 m)     Body mass index is 24.25 kg/m². Wt Readings from Last 3 Encounters:   10/29/21 169 lb (76.7 kg)   10/21/21 182 lb 12.8 oz (82.9 kg)   10/05/21 174 lb 12 oz (79.3 kg)     BP Readings from Last 3 Encounters:   10/29/21 100/80   10/21/21 (!) 105/45   10/05/21 130/88       Review of Systems   Constitutional: Positive for fatigue. Negative for appetite change and unexpected weight change. HENT: Negative for congestion, ear pain, rhinorrhea and sore throat. Eyes: Negative for discharge and redness. Respiratory: Negative for cough, choking and wheezing. Cardiovascular: Positive for chest pain (due to surgery). Gastrointestinal: Negative for blood in stool, constipation and diarrhea. Genitourinary: Negative for decreased urine volume and dysuria. Skin: Negative for rash. Neurological: Negative for weakness. Hematological: Negative for adenopathy. Psychiatric/Behavioral: Negative for suicidal ideas. Physical Exam  Vitals reviewed. Constitutional:       Appearance: He is well-developed. HENT:      Head: Normocephalic. Nose: Nasal deformity (septum removed, wears bandage over nose) present. Mouth/Throat:      Dentition: Abnormal dentition. Eyes:      Conjunctiva/sclera: Conjunctivae normal.   Cardiovascular:      Rate and Rhythm: Normal rate and regular rhythm. Heart sounds: Normal heart sounds. Pulmonary:      Effort: Pulmonary effort is normal.      Breath sounds: Normal breath sounds. No wheezing or rales. Chest:      Comments: Incision is healing and looking well. Abdominal:      General: Bowel sounds are normal.      Palpations: Abdomen is soft. Tenderness: There is no abdominal tenderness. Musculoskeletal:      Cervical back: Normal range of motion and neck supple. Skin:     General: Skin is warm and dry. Comments: Multiple AK on arms, hands,ears, and face   Neurological:      Mental Status: He is alert and oriented to person, place, and time. Psychiatric:         Behavior: Behavior normal.               Assessment/Plan:  1. S/P coronary artery bypass graft x 5    - DME Order for Bath/Shower Seat as OP  - [facility] DISCHARGE MEDS RECONCILED W/ CURRENT OUTPATIENT MED LIST    2. Hospital discharge follow-up      3. Coronary artery disease of native artery of native heart with stable angina pectoris [facility])      Keep appt with Dr. Koroma Come. Continue all medications.        Medical Decision Making: high complexity

## 2021-11-05 DIAGNOSIS — K21.9 GASTROESOPHAGEAL REFLUX DISEASE WITHOUT ESOPHAGITIS: Primary | ICD-10-CM

## 2021-11-08 RX ORDER — ESOMEPRAZOLE MAGNESIUM 40 MG/1
40 CAPSULE, DELAYED RELEASE ORAL DAILY
Qty: 30 CAPSULE | Refills: 11 | Status: SHIPPED | OUTPATIENT
Start: 2021-11-08 | End: 2022-09-12 | Stop reason: SDUPTHER

## 2021-11-08 NOTE — TELEPHONE ENCOUNTER
Called and advised pts wife of this.  She said that he just had open heart surgery and wants to wait on referral

## 2021-11-22 ENCOUNTER — TELEPHONE (OUTPATIENT)
Dept: PRIMARY CARE CLINIC | Age: 66
End: 2021-11-22

## 2021-11-22 NOTE — TELEPHONE ENCOUNTER
pts daughter called stating that BALTAZAR DerasCAPO, will be receiving a fax from [facility] regarding meals being delivered to pt while he is recovering from open heart surgery. If she will sign that and fax it back, they can get this started for pt. If she has any questions, can call daughter back at [phone]. Will send to provider.

## 2021-11-24 NOTE — DISCHARGE SUMMARY
Discharge Summary    Quynh Delgado  :  1955  MRN:  [id number]    Admit date:  10/21/2021  Discharge date:  10/21/2021    Admitting Physician:  Sky Woodard MD    Advance Directive: Prior    Consults: Hospitalist service    Primary Care Physician:  CAPO Qureshi    Discharge Diagnoses: Active Problems:    Acute ST elevation myocardial infarction (STEMI) of anteroseptal wall ([facility])  Resolved Problems:    * No resolved hospital problems. *      Cardiology Specific Data:  Specialty Problems        Cardiology Problems    Acute ST elevation myocardial infarction (STEMI) of anteroseptal wall [facility])        Essential hypertension              Significant Diagnostic Studies:   XR CHEST PORTABLE    Result Date: 10/21/2021  Exam:   XR CHEST PORTABLE  Date:  10/21/2021 History:  Male, age  77 years; STEMI COMPARISON:  Chest x-ray dated 2021. Findings : Lower lung volumes, which results in coarsening of interstitium. The heart and mediastinum are normal in size. Lungs are without focal infiltrate, mass or effusions. The bones show no acute pathology. Impression: 1. Lower lung volumes, which results in coarsening of interstitium. 2.  Otherwise, no acute cardiopulmonary process.  Signed by Dr Austin Pemberton    ECHO 2D [address]    Result Date: 10/21/2021  Transthoracic Echocardiography Report (TTE)  Demographics   Patient Name  Idalmis Doan Date of Study         10/21/2021   MRN           [id number]          Gender                Male   Date of Birth 1955      Room Number           MHL-0150   Age           77 year(s)   Height:       70 inches       Referring Physician   Yenny Louise MD   Weight:       174 pounds      Sonographer           Itzel Vigil RDCS   BSA:          1.97 m^2        Interpreting          Kash Hurt                                Physician   BMI:          24.97 kg/m^2  Procedure Type of Study   TTE procedure:ECHOCARDIOGRAM COMPLETE 2D WO COLOR DOPPLER. Study Location: Portable Technical Quality: Limited visualization Patient Status: Inpatient Contrast Medium: Definity. Indications:Myocardial infarction. Conclusions   Summary  Normal left ventricular size with severely reduced LV function and an  estimated ejection fraction of approximately 25-30%. Severe anterior and  anterolateral wall hypokinesis. No evidence of left ventricular mass or  thrombus noted. Normal right ventricular size with grossly preserved RV function. No hemodynamically significant valvular abnormalities. Aortic root is within normal limits. No evidence of significant pericardial effusion is noted. The rhythm is sinus with BBB. Signature   ----------------------------------------------------------------  Electronically signed by Anabell Murray(Interpreting physician)  on 10/22/2021 02:55 PM  ----------------------------------------------------------------   Findings   Mitral Valve  Structurally normal mitral valve with normal leaflet mobility. No evidence  of mitral valve stenosis or mitral regurgitation. Aortic Valve  Aortic valve not well visualized. No significant aortic regurgitation or stenosis is noted. Tricuspid Valve  Tricuspid valve is structurally normal.  No evidence of tricuspid regurgitation. Pulmonic Valve  The pulmonic valve was not well visualized. Left Atrium  Normal size left atrium. Left Ventricle  Normal left ventricular size with severely reduced LV function and an  estimated ejection fraction of approximately 25-30%. Severe anterior and  anterolateral wall hypokinesis. No evidence of left ventricular mass or thrombus noted. Right Atrium  Normal right atrial dimension. Right Ventricle  Normal right ventricular size with grossly preserved RV function. Pericardial Effusion  No evidence of significant pericardial effusion is noted. Pleural Effusion  No evidence of pleural effusion. Miscellaneous  Aortic root is within normal limits. Allergies   - No known allergies. M-Mode Measurements (cm)   % Ejection Fraction: 25 %                                                     LVOT: 2.1 cm  Doppler Measurements:   AV Peak Velocity:101 cm/s           MV Peak E-Wave: 41.4 cm/s  AV Peak Gradient: 4.08 mmHg         MV Peak A-Wave: 52.2 cm/s                                      MV E/A Ratio: 0.79 %                                      MV Peak Gradient: 0.69 mmHg        Pertinent Labs:   CBC: No results for input(s): WBC, HGB, PLT in the last 72 hours. BMP:  No results for input(s): NA, K, CL, CO2, BUN, CREATININE, GLUCOSE in the last 72 hours. INR: No results for input(s): INR in the last 72 hours. Lipids: No results for input(s): CHOL, HDL in the last 72 hours. Invalid input(s): LDLCALCU  ABGs:No results for input(s): PHART, OKP4JDJ, PO2ART, XAC0PZY, BEART, HGBAE, W9FAFZHI, CARBOXHGBART, 02THERAPY in the last 72 hours. HgBA1c:  No results for input(s): LABA1C in the last 72 hours. Procedures: Cardiac catheterization 10/21/2021    Hospital Course: Admitted 10/21/2021 with acute chest pain awakened him at 0100. Recent stress test 10/13/2021 abnormal he had actually been scheduled for cardiac cath which he had never had before. Admitted with suspected acute anterior septal STEMI. Troponin 0.09.  proBNP 513. Cardiac catheterization revealed moderately to severely impaired left ventricular systolic function there was severe multivessel coronary disease. There was a high-grade proximal LAD stenosis which appeared to be the culprit vessel. We elected to proceed with intervention we performed balloon angioplasty with a 2.0 mm balloon. We considered a stent placement but felt the patient would overall benefit more from CABG. As there was adequate flow reestablished with the balloon procedure we elected to not place a stent. Subsequent to this the patient did well in the hospital.  Troponin was 1.79.  proBNP 513.   Echocardiogram obtained revealed ejection fraction 25 to 30% severe anterior and anterolateral wall hypokinesis. Discussed with patient and family recommended consideration for CABG. Hospitalist also saw patient. Family requested patient to be transferred to Dr. Guillermo Sahu in [place] as one of the other family members have been operated on by him. I contacted Dr. Guillermo Sahu and he agreed to accept the patient. Plan was to transfer by helicopter transfer. At the time of transfer he was hemodynamically stable and pain-free. Transferred later that day. Physical Exam:    Vital Signs: BP (!) 105/45   Pulse 102   Temp 97.9 °F (36.6 °C)   Resp 26   Ht 5' 10\" (1.778 m)   Wt 182 lb 12.8 oz (82.9 kg)   SpO2 97%   BMI 26.23 kg/m²     Physical Exam      Discharge Medications:    @DISCHARGEMEDSLIST(<NOROUTINE> error)@    Discharge Instructions:   Rhonda Gannon MD  [address] [facility] [place]  [phone]    On 11/18/2021  3:00 pm         Take medications as directed. Resume activity as tolerated.     Diet: No diet orders on file     Disposition: Patient is medically stable and will be discharged *    Gerardo Chaudhary MD, 11/24/2021 11:39 AM

## 2021-11-24 NOTE — TELEPHONE ENCOUNTER
Returned call to pts daughter to let her now that we still haven't received the orders for the meals. She said that she would call them again but it may be after the holidays before she gets anyone on the phone.

## 2021-12-10 ENCOUNTER — OFFICE VISIT (OUTPATIENT)
Dept: PRIMARY CARE CLINIC | Age: 66
End: 2021-12-10
Payer: MEDICARE

## 2021-12-10 VITALS
OXYGEN SATURATION: 98 % | HEART RATE: 71 BPM | TEMPERATURE: 98 F | SYSTOLIC BLOOD PRESSURE: 120 MMHG | HEIGHT: 70 IN | DIASTOLIC BLOOD PRESSURE: 71 MMHG | WEIGHT: 170 LBS | BODY MASS INDEX: 24.34 KG/M2

## 2021-12-10 DIAGNOSIS — F41.1 GAD (GENERALIZED ANXIETY DISORDER): ICD-10-CM

## 2021-12-10 DIAGNOSIS — G44.229 CHRONIC TENSION-TYPE HEADACHE, NOT INTRACTABLE: ICD-10-CM

## 2021-12-10 DIAGNOSIS — F51.01 PRIMARY INSOMNIA: ICD-10-CM

## 2021-12-10 DIAGNOSIS — Z00.00 ROUTINE GENERAL MEDICAL EXAMINATION AT A HEALTH CARE FACILITY: Primary | ICD-10-CM

## 2021-12-10 PROCEDURE — G0438 PPPS, INITIAL VISIT: HCPCS | Performed by: NURSE PRACTITIONER

## 2021-12-10 RX ORDER — CLONAZEPAM 1 MG/1
1 TABLET ORAL 3 TIMES DAILY PRN
Qty: 90 TABLET | Refills: 0 | Status: SHIPPED | OUTPATIENT
Start: 2021-12-10 | End: 2022-03-24 | Stop reason: SDUPTHER

## 2021-12-10 RX ORDER — BUTALBITAL, ACETAMINOPHEN AND CAFFEINE 50; 325; 40 MG/1; MG/1; MG/1
1 TABLET ORAL EVERY 6 HOURS PRN
Qty: 120 TABLET | Refills: 0 | Status: SHIPPED | OUTPATIENT
Start: 2021-12-10 | End: 2022-03-24 | Stop reason: SDUPTHER

## 2021-12-10 ASSESSMENT — PATIENT HEALTH QUESTIONNAIRE - PHQ9
2. FEELING DOWN, DEPRESSED OR HOPELESS: 0
SUM OF ALL RESPONSES TO PHQ QUESTIONS 1-9: 0
SUM OF ALL RESPONSES TO PHQ9 QUESTIONS 1 & 2: 0
SUM OF ALL RESPONSES TO PHQ QUESTIONS 1-9: 0
SUM OF ALL RESPONSES TO PHQ QUESTIONS 1-9: 0
1. LITTLE INTEREST OR PLEASURE IN DOING THINGS: 0

## 2021-12-10 ASSESSMENT — LIFESTYLE VARIABLES: HOW OFTEN DO YOU HAVE A DRINK CONTAINING ALCOHOL: 0

## 2021-12-10 NOTE — PATIENT INSTRUCTIONS
Personalized Preventive Plan for Parth Stack - 12/10/2021  Medicare offers a range of preventive health benefits. Some of the tests and screenings are paid in full while other may be subject to a deductible, co-insurance, and/or copay. Some of these benefits include a comprehensive review of your medical history including lifestyle, illnesses that may run in your family, and various assessments and screenings as appropriate. After reviewing your medical record and screening and assessments performed today your provider may have ordered immunizations, labs, imaging, and/or referrals for you. A list of these orders (if applicable) as well as your Preventive Care list are included within your After Visit Summary for your review. Other Preventive Recommendations:    · A preventive eye exam performed by an eye specialist is recommended every 1-2 years to screen for glaucoma; cataracts, macular degeneration, and other eye disorders. · A preventive dental visit is recommended every 6 months. · Try to get at least 150 minutes of exercise per week or 10,000 steps per day on a pedometer . · Order or download the FREE \"Exercise & Physical Activity: Your Everyday Guide\" from [employer]. Call 6-950.658.5213 or search [employer] online. · You need 6624-4442 mg of calcium and 6831-3123 IU of vitamin D per day. It is possible to meet your calcium requirement with diet alone, but a vitamin D supplement is usually necessary to meet this goal.  · When exposed to the sun, use a sunscreen that protects against both UVA and UVB radiation with an SPF of 30 or greater. Reapply every 2 to 3 hours or after sweating, drying off with a towel, or swimming. · Always wear a seat belt when traveling in a car. Always wear a helmet when riding a bicycle or motorcycle.

## 2021-12-10 NOTE — PROGRESS NOTES
Medicare Annual Wellness Visit  Name: Yuli Pantoja Date: 12/10/2021   MRN: [id number] Sex: Male   Age: 77 y.o. Ethnicity: Non- / Non    : 1955 Race: White (non-)      Jolanta Osborn is here for Medicare AWV (med refill )    Screenings for behavioral, psychosocial and functional/safety risks, and cognitive dysfunction are all negative except as indicated below. These results, as well as other patient data from the [address] form, are documented in Flowsheets linked to this Encounter. No Known Allergies      Prior to Visit Medications    Medication Sig Taking? Authorizing Provider   butalbital-acetaminophen-caffeine (FIORICET, ESGIC) -40 MG per tablet Take 1 tablet by mouth every 6 hours as needed for Headaches Yes CAPO Gutierrez   clonazePAM (KLONOPIN) 1 MG tablet Take 1 tablet by mouth 3 times daily as needed for Anxiety for up to 30 days. Yes SlaterCAPO Kingsley   esomeprazole (NEXIUM) 40 MG delayed release capsule Take 1 capsule by mouth daily Yes CAPO Gutierrez   clopidogrel (PLAVIX) 75 MG tablet Take 75 mg by mouth daily Yes Historical Provider, MD   HYDROcodone-acetaminophen (NORCO) 5-325 MG per tablet Take 1 tablet by mouth every 6 hours as needed for Pain. Yes Historical Provider, MD   cyclobenzaprine (FLEXERIL) 5 MG tablet Take 5 mg by mouth 3 times daily as needed for Muscle spasms Yes Historical Provider, MD   potassium chloride (KLOR-CON) 20 MEQ packet Take 20 mEq by mouth 2 times daily Yes Historical Provider, MD   furosemide (LASIX) 40 MG tablet Take 40 mg by mouth daily Yes Historical Provider, MD   atomoxetine (STRATTERA) 40 MG capsule Take 40 mg by mouth daily Yes Historical Provider, MD   aspirin 81 MG EC tablet Take 81 mg by mouth daily Yes Historical Provider, MD   Metoprolol Succinate 25 MG CS24 Take by mouth Yes Historical Provider, MD   Misc.  Devices (ROLLATOR ULTRA-LIGHT) MISC Use to walk Yes CAPO Coronado nicotine (NICODERM CQ) 21 MG/24HR Place 1 patch onto the skin every 24 hours Yes CAPO Gutierrez   VENTOLIN  (90 Base) MCG/ACT inhaler Inhale 2 puffs into the lungs every 6 hours as needed for Wheezing Yes CAPO Cardoza   omeprazole (PRILOSEC) 40 MG delayed release capsule Take 1 capsule by mouth daily Yes CAPO Gutierrez   amitriptyline (ELAVIL) 25 MG tablet Take 1 tablet by mouth nightly For headache prevention and sleep Yes CAPO Gutierrez   Aspirin Buf,TsTzo-KeCjr-KnLdt, (BUFFERED ASPIRIN) 325 MG TABS Take 325 mg by mouth daily Yes Historical Provider, MD         Past Medical History:   Diagnosis Date    Cancer ([facility] [address]     Headache     Hypertension        Past Surgical History:   Procedure Laterality Date    NOSE SURGERY  09/2016         Family History   Problem Relation Age of Onset    Cancer Mother     Coronary Art Dis Father     Coronary Art Dis Brother        CareTeam (Including outside providers/suppliers regularly involved in providing care):   Patient Care Team:  CAPO Cardoza as PCP - General (Family Nurse Practitioner)  CAPO Cardoza [facility] PCP - [facility]    Wt Readings from Last 3 Encounters:   12/10/21 170 lb (77.1 kg)   10/29/21 169 lb (76.7 kg)   10/21/21 182 lb 12.8 oz (82.9 kg)     Vitals:    12/10/21 1001   BP: 120/71   Site: Left Upper Arm   Position: Sitting   Cuff Size: Large Adult   Pulse: 71   Temp: 98 °F (36.7 °C)   TempSrc: Temporal   SpO2: 98%   Weight: 170 lb (77.1 kg)   Height: 5' 10\" (1.778 m)     Body mass index is 24.39 kg/m². Based upon direct observation of the patient, evaluation of cognition reveals recent and remote memory intact. Patient's complete Health Risk Assessment and screening values have been reviewed and are found in Flowsheets. The following problems were reviewed today and where indicated follow up appointments were made and/or referrals ordered.     Positive Risk Factor Screenings with Interventions:      Cognitive: Words recalled: 0 Words Recalled  Clock Drawing Test (CDT) Score: Normal  Total Score Interpretation: Positive Mini-Cog  Cognitive Impairment Interventions:  · Patient declines any further evaluation/treatment for cognitive impairment    General Health and ACP:  General  In general, how would you say your health is?: Good  In the past 7 days, have you experienced any of the following?  New or Increased Pain, New or Increased Fatigue, Loneliness, Social Isolation, Stress or Anger?: None of These  Do you get the social and emotional support that you need?: Yes  Do you have a Living Will?: (!) No  Advance Directives     Power of [facility] Will ACP-Advance Directive ACP-Power of     Not on File Not on File Filed Not on File      General Health Risk Interventions:  · No Living Will: Advance Care Planning addressed with patient today    Health Habits/Nutrition:  Health Habits/Nutrition  Do you exercise for at least 20 minutes 2-3 times per week?: Yes  Have you lost any weight without trying in the past 3 months?: No  Do you eat only one meal per day?: No  Have you seen the dentist within the past year?: (!) No  Body mass index: 24.39  Health Habits/Nutrition Interventions:  · Dental exam overdue:  has dentures    Hearing/Vision:  No exam data present  Hearing/Vision  Do you or your family notice any trouble with your hearing that hasn't been managed with hearing aids?: No  Do you have difficulty driving, watching TV, or doing any of your daily activities because of your eyesight?: No  Have you had an eye exam within the past year?: (!) No  Hearing/Vision Interventions:  · Vision concerns:  patient encouraged to make appointment with his/her eye specialist      Personalized Preventive Plan   Current Health Maintenance Status  Immunization History   Administered Date(s) Administered    Dario WHITE Batch, Primary or Immunocompromised, PF, 100mcg/0.5mL 08/25/2021    Tdap (Boostrix, Adacel) 08/28/2017        Health Maintenance   Topic Date Due    AAA screen  Never done    Hepatitis C screen  Never done    Colon cancer screen colonoscopy  Never done    Shingles Vaccine (1 of 2) Never done    Low dose CT lung screening  Never done    Pneumococcal 65+ years Vaccine (1 of 1 - PPSV23) Never done    Flu vaccine (1) Never done    COVID-19 Vaccine (2 - Moderna 3-dose booster series) 09/22/2021    Annual Wellness Visit (AWV)  Never done    Potassium monitoring  10/21/2022    Creatinine monitoring  10/21/2022    Lipid screen  10/21/2026    DTaP/Tdap/Td vaccine (2 - Td or Tdap) 08/28/2027    Hepatitis A vaccine  Aged Out    Hepatitis B vaccine  Aged Out    Hib vaccine  Aged Out    Meningococcal (ACWY) vaccine  Aged Out     Recommendations for PDV Due: see orders and patient instructions/AVS.  . Recommended screening schedule for the next 5-10 years is provided to the patient in written form: see Patient Instructions/AVS.    Abe Atkins was seen today for medicare awv. Diagnoses and all orders for this visit:    Routine general medical examination at a health care facility    Chronic tension-type headache, not intractable  -     butalbital-acetaminophen-caffeine (FIORICET, ESGIC) -40 MG per tablet; Take 1 tablet by mouth every 6 hours as needed for Headaches    Primary insomnia  -     clonazePAM (KLONOPIN) 1 MG tablet; Take 1 tablet by mouth 3 times daily as needed for Anxiety for up to 30 days. ABHINAV (generalized anxiety disorder)  -     clonazePAM (KLONOPIN) 1 MG tablet; Take 1 tablet by mouth 3 times daily as needed for Anxiety for up to 30 days.

## 2022-01-03 ENCOUNTER — TELEPHONE (OUTPATIENT)
Dept: CARDIAC REHAB | Age: 67
End: 2022-01-03

## 2022-01-03 NOTE — PROGRESS NOTES
Had received a telephone call from [address] [place] [address] in [place], [place] about a cardiac referral for this patient. Follow-up call made today. Daughter states patient is going to begin cardiac rehab at [address] [place] [address] in [place], [place] because his cardiologist is Dr. Manfred Hernandez.

## 2022-02-08 ENCOUNTER — OFFICE VISIT (OUTPATIENT)
Dept: PRIMARY CARE CLINIC | Age: 67
End: 2022-02-08
Payer: MEDICARE

## 2022-02-08 ENCOUNTER — HOSPITAL ENCOUNTER (OUTPATIENT)
Dept: GENERAL RADIOLOGY | Age: 67
Discharge: HOME | End: 2022-02-08
Payer: MEDICARE

## 2022-02-08 ENCOUNTER — TELEPHONE (OUTPATIENT)
Dept: PRIMARY CARE CLINIC | Age: 67
End: 2022-02-08

## 2022-02-08 VITALS
HEIGHT: 70 IN | WEIGHT: 180.5 LBS | TEMPERATURE: 96.9 F | BODY MASS INDEX: 25.84 KG/M2 | SYSTOLIC BLOOD PRESSURE: 130 MMHG | HEART RATE: 103 BPM | DIASTOLIC BLOOD PRESSURE: 80 MMHG | OXYGEN SATURATION: 95 %

## 2022-02-08 DIAGNOSIS — R05.9 COUGH: ICD-10-CM

## 2022-02-08 DIAGNOSIS — Z20.822 SUSPECTED COVID-19 VIRUS INFECTION: ICD-10-CM

## 2022-02-08 DIAGNOSIS — J40 BRONCHITIS: Primary | ICD-10-CM

## 2022-02-08 DIAGNOSIS — R06.00 DYSPNEA, UNSPECIFIED TYPE: ICD-10-CM

## 2022-02-08 DIAGNOSIS — R06.02 SOB (SHORTNESS OF BREATH): ICD-10-CM

## 2022-02-08 LAB — SARS-COV-2, PCR: NOT DETECTED

## 2022-02-08 PROCEDURE — 71046 X-RAY EXAM CHEST 2 VIEWS: CPT

## 2022-02-08 PROCEDURE — 99213 OFFICE O/P EST LOW 20 MIN: CPT | Performed by: NURSE PRACTITIONER

## 2022-02-08 RX ORDER — CEFDINIR 300 MG/1
300 CAPSULE ORAL 2 TIMES DAILY
Qty: 20 CAPSULE | Refills: 0 | Status: SHIPPED | OUTPATIENT
Start: 2022-02-08 | End: 2022-02-18

## 2022-02-08 ASSESSMENT — PATIENT HEALTH QUESTIONNAIRE - PHQ9
3. TROUBLE FALLING OR STAYING ASLEEP: 0
SUM OF ALL RESPONSES TO PHQ9 QUESTIONS 1 & 2: 0
SUM OF ALL RESPONSES TO PHQ QUESTIONS 1-9: 0
7. TROUBLE CONCENTRATING ON THINGS, SUCH AS READING THE NEWSPAPER OR WATCHING TELEVISION: 0
6. FEELING BAD ABOUT YOURSELF - OR THAT YOU ARE A FAILURE OR HAVE LET YOURSELF OR YOUR FAMILY DOWN: 0
9. THOUGHTS THAT YOU WOULD BE BETTER OFF DEAD, OR OF HURTING YOURSELF: 0
5. POOR APPETITE OR OVEREATING: 0
2. FEELING DOWN, DEPRESSED OR HOPELESS: 0
1. LITTLE INTEREST OR PLEASURE IN DOING THINGS: 0
SUM OF ALL RESPONSES TO PHQ QUESTIONS 1-9: 0
4. FEELING TIRED OR HAVING LITTLE ENERGY: 0
SUM OF ALL RESPONSES TO PHQ QUESTIONS 1-9: 0
10. IF YOU CHECKED OFF ANY PROBLEMS, HOW DIFFICULT HAVE THESE PROBLEMS MADE IT FOR YOU TO DO YOUR WORK, TAKE CARE OF THINGS AT HOME, OR GET ALONG WITH OTHER PEOPLE: 0
SUM OF ALL RESPONSES TO PHQ QUESTIONS 1-9: 0
8. MOVING OR SPEAKING SO SLOWLY THAT OTHER PEOPLE COULD HAVE NOTICED. OR THE OPPOSITE, BEING SO FIGETY OR RESTLESS THAT YOU HAVE BEEN MOVING AROUND A LOT MORE THAN USUAL: 0

## 2022-02-08 ASSESSMENT — ENCOUNTER SYMPTOMS
RHINORRHEA: 0
SORE THROAT: 0
TROUBLE SWALLOWING: 0
COUGH: 1
ABDOMINAL PAIN: 0
SHORTNESS OF BREATH: 1
DIARRHEA: 0
NAUSEA: 0
CONSTIPATION: 0
VOMITING: 0

## 2022-02-08 NOTE — PROGRESS NOTES
Karissa Mei (:  1955) is a 77 y.o. male,Established patient, here for evaluation of the following chief complaint(s):  Cough, Shortness of Breath, and Congestion      ASSESSMENT/PLAN:    ICD-10-CM    1. Bronchitis  J40 cefdinir (OMNICEF) 300 MG capsule   2. Dyspnea, unspecified type  R06.00 XR CHEST STANDARD (2 VW)     COVID-19   3. Cough  R05.9 XR CHEST STANDARD (2 VW)     COVID-19   4. Suspected COVID-19 virus infection  Z20.822 XR CHEST STANDARD (2 VW)     COVID-19   5. SOB (shortness of breath)  R06.02 VENTOLIN  (90 Base) MCG/ACT inhaler    Increase fluids  Tylenol and/or motrin over the counter for fever or pain  Over the counter cough/cold medicine, avoid decongestants. Rest when able  If throat is sore, warm salt water rinses and/or throat lozenges  May take over the counter zinc, vitamin d and vitamin c to help assist immune system. Quarantine until results. Frequent deep breaths and stay up and active in home. Return if symptoms worsen or fail to improve. SUBJECTIVE/OBJECTIVE:  HPI  Here for cough, congestion and SOA  Onset 1 week ago  Not Been taking anything for symptoms  Cough has been productive yellow and green at times. Have had covid vaccine series  No known covid exposures  Denies any fever. Hx tobacco use - quit in October when had CABG  He has been told he has emphysema in the past.     /80 (Site: Left Upper Arm, Position: Sitting, Cuff Size: Large Adult)   Pulse 103   Temp 96.9 °F (36.1 °C)   Ht 5' 10\" (1.778 m)   Wt 180 lb 8 oz (81.9 kg)   SpO2 95%   BMI 25.90 kg/m²     Review of Systems   Constitutional: Negative for activity change, appetite change, fatigue, fever and unexpected weight change. HENT: Positive for congestion. Negative for ear pain, rhinorrhea, sore throat and trouble swallowing. Eyes: Negative for visual disturbance. Respiratory: Positive for cough and shortness of breath.     Cardiovascular: Negative for chest pain, palpitations and leg swelling. Gastrointestinal: Negative for abdominal pain, constipation, diarrhea, nausea and vomiting. Genitourinary: Negative for flank pain. Musculoskeletal: Negative for arthralgias, myalgias, neck pain and neck stiffness. Neurological: Negative for headaches. Psychiatric/Behavioral: Negative for decreased concentration and sleep disturbance. The patient is not nervous/anxious. Physical Exam  Vitals reviewed. Constitutional:       Appearance: Normal appearance. HENT:      Head: Normocephalic and atraumatic. Right Ear: Tympanic membrane, ear canal and external ear normal.      Left Ear: Tympanic membrane, ear canal and external ear normal.      Nose: Nose normal.      Mouth/Throat:      Mouth: Mucous membranes are moist.      Pharynx: Oropharynx is clear. Eyes:      Conjunctiva/sclera: Conjunctivae normal.   Cardiovascular:      Rate and Rhythm: Normal rate and regular rhythm. Pulses: Normal pulses. Heart sounds: Normal heart sounds. Pulmonary:      Effort: Pulmonary effort is normal.      Breath sounds: Wheezing present. Abdominal:      General: There is no distension. Palpations: Abdomen is soft. Tenderness: There is no abdominal tenderness. There is no guarding. Musculoskeletal:      Cervical back: Normal range of motion and neck supple. Skin:     General: Skin is warm. Neurological:      Mental Status: He is alert and oriented to person, place, and time. An electronic signature was used to authenticate this note.     --CAPO Almodovar

## 2022-02-08 NOTE — PATIENT INSTRUCTIONS
Patient Education        Bronchitis: Care Instructions  Your Care Instructions     Bronchitis is inflammation of the bronchial tubes, which carry air to the lungs. The tubes swell and produce mucus, or phlegm. The mucus and inflamed bronchial tubes make you cough. You may have trouble breathing. Most cases of bronchitis are caused by viruses like those that cause colds. Antibiotics usually do not help and they may be harmful. Bronchitis usually develops rapidly and lasts about 2 to 3 weeks in otherwise healthy people. Follow-up care is a key part of your treatment and safety. Be sure to make and go to all appointments, and call your doctor if you are having problems. It's also a good idea to know your test results and keep a list of the medicines you take. How can you care for yourself at home? · Take all medicines exactly as prescribed. Call your doctor if you think you are having a problem with your medicine. · Get some extra rest.  · Take an over-the-counter pain medicine, such as acetaminophen (Tylenol), ibuprofen (Advil, Motrin), or naproxen (Aleve) to reduce fever and relieve body aches. Read and follow all instructions on the label. · Do not take two or more pain medicines at the same time unless the doctor told you to. Many pain medicines have acetaminophen, which is Tylenol. Too much acetaminophen (Tylenol) can be harmful. · Take an over-the-counter cough medicine to help quiet a dry, hacking cough so that you can sleep. Avoid cough medicines that have more than one active ingredient. Read and follow all instructions on the label. · Do not smoke. Smoking can make bronchitis worse. If you need help quitting, talk to your doctor about stop-smoking programs and medicines. These can increase your chances of quitting for good. When should you call for help? Call 911 anytime you think you may need emergency care. For example, call if:    · You have severe trouble breathing.    Call your doctor now or seek immediate medical care if:    · You have new or worse trouble breathing.     · You cough up dark brown or bloody mucus (sputum).     · You have a new or higher fever.     · You have a new rash. Watch closely for changes in your health, and be sure to contact your doctor if:    · You cough more deeply or more often, especially if you notice more mucus or a change in the color of your mucus.     · You are not getting better as expected. Where can you learn more? Go to https://[employer].zulily. org and sign in to your [employer] account. Enter H333 in the [employer] box to learn more about \"Bronchitis: Care Instructions. \"     If you do not have an account, please click on the \"Sign Up Now\" link. Current as of: July 6, 2021               Content Version: 13.1  © 0939-1576 Healthwise, Incorporated. Care instructions adapted under license by [facility] ([facility]). If you have questions about a medical condition or this instruction, always ask your healthcare professional. [address] any warranty or liability for your use of this information.

## 2022-02-08 NOTE — TELEPHONE ENCOUNTER
----- Message from CAPO Story sent at 2/8/2022  2:15 PM CST -----  Please call patient and let them know results.    Normal chest xray

## 2022-02-08 NOTE — TELEPHONE ENCOUNTER
Called patient, spoke with: Child/Children regarding the results of the patients most recent xray. I advised Child/Children of Vanessa Mathis recommendations.    Child/Children did voice understanding

## 2022-02-09 ENCOUNTER — TELEPHONE (OUTPATIENT)
Dept: PRIMARY CARE CLINIC | Age: 67
End: 2022-02-09

## 2022-02-09 NOTE — TELEPHONE ENCOUNTER
----- Message from CAPO Traore sent at 2/9/2022  8:00 AM CST -----  Please notify patient that they are Covid negative

## 2022-03-24 ENCOUNTER — OFFICE VISIT (OUTPATIENT)
Dept: PRIMARY CARE CLINIC | Age: 67
End: 2022-03-24
Payer: MEDICARE

## 2022-03-24 VITALS
DIASTOLIC BLOOD PRESSURE: 84 MMHG | OXYGEN SATURATION: 96 % | SYSTOLIC BLOOD PRESSURE: 134 MMHG | HEART RATE: 94 BPM | WEIGHT: 181 LBS | BODY MASS INDEX: 25.97 KG/M2 | TEMPERATURE: 96.7 F

## 2022-03-24 DIAGNOSIS — R59.9 ENLARGED LYMPH NODE: ICD-10-CM

## 2022-03-24 DIAGNOSIS — F51.01 PRIMARY INSOMNIA: ICD-10-CM

## 2022-03-24 DIAGNOSIS — R93.89 ABNORMAL CT OF THE CHEST: Primary | ICD-10-CM

## 2022-03-24 DIAGNOSIS — J42 CHRONIC BRONCHITIS, UNSPECIFIED CHRONIC BRONCHITIS TYPE (HCC): ICD-10-CM

## 2022-03-24 DIAGNOSIS — G44.229 CHRONIC TENSION-TYPE HEADACHE, NOT INTRACTABLE: ICD-10-CM

## 2022-03-24 DIAGNOSIS — F17.200 SMOKER: ICD-10-CM

## 2022-03-24 DIAGNOSIS — R91.1 PULMONARY NODULE: ICD-10-CM

## 2022-03-24 DIAGNOSIS — F41.1 GAD (GENERALIZED ANXIETY DISORDER): ICD-10-CM

## 2022-03-24 PROCEDURE — 99214 OFFICE O/P EST MOD 30 MIN: CPT | Performed by: NURSE PRACTITIONER

## 2022-03-24 RX ORDER — BUTALBITAL, ACETAMINOPHEN AND CAFFEINE 50; 325; 40 MG/1; MG/1; MG/1
1 TABLET ORAL EVERY 6 HOURS PRN
Qty: 120 TABLET | Refills: 0 | Status: SHIPPED | OUTPATIENT
Start: 2022-03-24 | End: 2022-05-31 | Stop reason: SDUPTHER

## 2022-03-24 RX ORDER — CLONAZEPAM 1 MG/1
1 TABLET ORAL 3 TIMES DAILY PRN
Qty: 90 TABLET | Refills: 0 | Status: SHIPPED | OUTPATIENT
Start: 2022-03-24 | End: 2022-05-31 | Stop reason: SDUPTHER

## 2022-03-24 SDOH — ECONOMIC STABILITY: FOOD INSECURITY: WITHIN THE PAST 12 MONTHS, THE FOOD YOU BOUGHT JUST DIDN'T LAST AND YOU DIDN'T HAVE MONEY TO GET MORE.: NEVER TRUE

## 2022-03-24 SDOH — ECONOMIC STABILITY: FOOD INSECURITY: WITHIN THE PAST 12 MONTHS, YOU WORRIED THAT YOUR FOOD WOULD RUN OUT BEFORE YOU GOT MONEY TO BUY MORE.: NEVER TRUE

## 2022-03-24 ASSESSMENT — ENCOUNTER SYMPTOMS
RHINORRHEA: 0
SORE THROAT: 0
CHOKING: 0
WHEEZING: 0
EYE DISCHARGE: 0
DIARRHEA: 0
CONSTIPATION: 0
BLOOD IN STOOL: 0
EYE REDNESS: 0
COUGH: 0

## 2022-03-24 ASSESSMENT — SOCIAL DETERMINANTS OF HEALTH (SDOH): HOW HARD IS IT FOR YOU TO PAY FOR THE VERY BASICS LIKE FOOD, HOUSING, MEDICAL CARE, AND HEATING?: NOT HARD AT ALL

## 2022-03-24 NOTE — PROGRESS NOTES
Lizzie Ferrera (:  1955) is a 77 y.o. male,Established patient, here for evaluation of the following chief complaint(s):  3 Month Follow-Up (Patient presents today for a 3month follow up from a lung nodule. Patient states he has seemed a little better. Today he had some issues with breathing walking into the building. )      ASSESSMENT/PLAN:    ICD-10-CM    1. Abnormal CT of the chest  R93.89 PET  FULL BODY   2. Pulmonary nodule  R91.1 PET  FULL BODY   3. Smoker  F17.200 PET  FULL BODY   4. Chronic bronchitis, unspecified chronic bronchitis type (HCC)  J42 PET  FULL BODY   5. Enlarged lymph node  R59.9 PET  FULL BODY   6. Chronic tension-type headache, not intractable  G44.229 butalbital-acetaminophen-caffeine (FIORICET, ESGIC) -40 MG per tablet   7. Primary insomnia  F51.01 clonazePAM (KLONOPIN) 1 MG tablet   8. ABHINAV (generalized anxiety disorder)  F41.1 clonazePAM (KLONOPIN) 1 MG tablet     He is not sure what he would do if he does have lung cancer, but agrees to go through testing. And if needed speak to oncology about treatment options. Return in about 3 months (around 2022) for or sooner depending on testing. SUBJECTIVE/OBJECTIVE:  HPI   3 Month Follow-Up (Patient presents today for a 3month follow up from a lung nodule. Patient states he has seemed a little better. Today he had some issues with breathing walking into the building. )    Performed by Mickey Sahu  1. No evidence of pulmonary thromboembolic disease. The thoracic aorta   is normal in caliber with no evidence of dissection or aneurysm. There   is mixed plaquing involving the proximal segment of the left subclavian   artery with mild associated stenosis. 2. Irregular nodule in the right upper lobe concerning for potential   neoplasm. There are also some mildly enlarged middle mediastinal nodes. Follow-up with PET/CT is recommended for further assessment. 3. Small effusions with bibasilar atelectasis.  There are changes of   centrilobular and paraseptal emphysema. Areas of suspected   bronchiolitis/bronchopneumonia are noted within the lower lobes. There   is also a focus of nodularity within the right middle lobe which could   also be followed up at the time of PET CT to assess for activity. 4. Elevated right heart pressure suspected with reflux of contrast into   the intrahepatic IVC. This report was finalized on 02/22/2022 16:45 by Dr. Moira Pineda MD.    Headaches  fiorecet helps. He will have somedays and not have a HA and then some days it will hurt all day.   Denies imaging and referral to neurology     Insomnia. This has improved with the klonopin. He is taking it at night. It is working to help him sleep   He is needing a refill on this medication    Skin cancer has come back on nose. Trying to decide what to do with that. Review of Systems   Constitutional: Negative for appetite change and unexpected weight change. HENT: Negative for congestion, ear pain, rhinorrhea and sore throat. Eyes: Negative for discharge and redness. Respiratory: Negative for cough, choking and wheezing. Gastrointestinal: Negative for blood in stool, constipation and diarrhea. Genitourinary: Negative for decreased urine volume and dysuria. Musculoskeletal: Positive for arthralgias. Skin: Negative for rash. Neurological: Negative for weakness. Hematological: Negative for adenopathy. Psychiatric/Behavioral: Negative for suicidal ideas. /84 (Site: Left Upper Arm, Position: Sitting, Cuff Size: Large Adult)   Pulse 94   Temp 96.7 °F (35.9 °C) (Temporal)   Wt 181 lb (82.1 kg)   SpO2 96%   BMI 25.97 kg/m²    Physical Exam  Vitals reviewed. Constitutional:       Appearance: He is well-developed. HENT:      Head: Normocephalic. Nose: Nasal deformity (septum removed, wears bandage over nose) present. Mouth/Throat:      Dentition: Abnormal dentition.    Eyes: Conjunctiva/sclera: Conjunctivae normal.   Cardiovascular:      Rate and Rhythm: Normal rate and regular rhythm. Heart sounds: Normal heart sounds. Pulmonary:      Effort: Pulmonary effort is normal.      Breath sounds: Normal breath sounds. No wheezing or rales. Abdominal:      General: Bowel sounds are normal.      Palpations: Abdomen is soft. Tenderness: There is no abdominal tenderness. Musculoskeletal:      Cervical back: Normal range of motion and neck supple. Skin:     General: Skin is warm and dry. Comments: Multiple AK on arms, hands,ears, and face   Neurological:      Mental Status: He is alert and oriented to person, place, and time. Psychiatric:         Behavior: Behavior normal.                 An electronic signature was used to authenticate this note.     --CAPO Garcia

## 2022-04-05 ENCOUNTER — HOSPITAL ENCOUNTER (OUTPATIENT)
Dept: NUCLEAR MEDICINE | Age: 67
Discharge: HOME | End: 2022-04-07
Payer: MEDICARE

## 2022-04-05 DIAGNOSIS — R93.89 ABNORMAL CT OF THE CHEST: ICD-10-CM

## 2022-04-05 DIAGNOSIS — R59.9 ENLARGED LYMPH NODE: ICD-10-CM

## 2022-04-05 DIAGNOSIS — R91.1 PULMONARY NODULE: ICD-10-CM

## 2022-04-05 DIAGNOSIS — J42 CHRONIC BRONCHITIS, UNSPECIFIED CHRONIC BRONCHITIS TYPE (HCC): ICD-10-CM

## 2022-04-05 DIAGNOSIS — F17.200 SMOKER: ICD-10-CM

## 2022-04-05 LAB
GLUCOSE BLD-MCNC: 93 MG/DL (ref 70–99)
PERFORMED ON: NORMAL

## 2022-04-05 PROCEDURE — 3430000000 HC RX DIAGNOSTIC RADIOPHARMACEUTICAL: Performed by: NURSE PRACTITIONER

## 2022-04-05 PROCEDURE — A9552 F18 FDG: HCPCS | Performed by: NURSE PRACTITIONER

## 2022-04-05 PROCEDURE — 82947 ASSAY GLUCOSE BLOOD QUANT: CPT

## 2022-04-05 PROCEDURE — 78813 PET IMAGE FULL BODY: CPT

## 2022-04-05 RX ORDER — FLUDEOXYGLUCOSE F 18 200 MCI/ML
10 INJECTION, SOLUTION INTRAVENOUS
Status: COMPLETED | OUTPATIENT
Start: 2022-04-05 | End: 2022-04-05

## 2022-04-05 RX ADMIN — FLUDEOXYGLUCOSE F 18 10 MILLICURIE: 200 INJECTION, SOLUTION INTRAVENOUS at 14:43

## 2022-04-06 ENCOUNTER — TELEPHONE (OUTPATIENT)
Dept: PRIMARY CARE CLINIC | Age: 67
End: 2022-04-06

## 2022-04-06 DIAGNOSIS — R93.89 ABNORMAL CT OF THE CHEST: Primary | ICD-10-CM

## 2022-04-06 DIAGNOSIS — R91.1 PULMONARY NODULE: ICD-10-CM

## 2022-04-06 NOTE — TELEPHONE ENCOUNTER
----- Message from CAPO Stern sent at 4/5/2022  5:12 PM CDT -----  Patient aware of PET scan results. I am going to refer to oncology, pulmonology and ENT. Please refer to Dr Alexis Abernathy, Dr Leonard Murdock and Dr Alexander Palacios (ENT)    Spoke with Jasson Post (daughter) and patient about these results.

## 2022-04-14 ENCOUNTER — OFFICE VISIT (OUTPATIENT)
Dept: ENT CLINIC | Age: 67
End: 2022-04-14
Payer: MEDICARE

## 2022-04-14 VITALS
SYSTOLIC BLOOD PRESSURE: 134 MMHG | HEIGHT: 70 IN | WEIGHT: 182 LBS | DIASTOLIC BLOOD PRESSURE: 66 MMHG | BODY MASS INDEX: 26.05 KG/M2

## 2022-04-14 DIAGNOSIS — C44.301 CANCER OF SKIN OF EXTERNAL NOSE: Primary | ICD-10-CM

## 2022-04-14 PROCEDURE — 99203 OFFICE O/P NEW LOW 30 MIN: CPT | Performed by: OTOLARYNGOLOGY

## 2022-04-14 ASSESSMENT — ENCOUNTER SYMPTOMS
ALLERGIC/IMMUNOLOGIC NEGATIVE: 1
GASTROINTESTINAL NEGATIVE: 1
EYES NEGATIVE: 1
RESPIRATORY NEGATIVE: 1

## 2022-04-14 NOTE — PROGRESS NOTES
2022    Roger Novoa (:  1955) is a 77 y.o. male, Established patient, here for evaluation of the following chief complaint(s):  New Patient (abnormal pet scan )      Vitals:    22 1356   BP: 134/66   Weight: 182 lb (82.6 kg)   Height: 5' 10\" (1.778 m)       Wt Readings from Last 3 Encounters:   22 182 lb (82.6 kg)   22 181 lb (82.1 kg)   22 180 lb 8 oz (81.9 kg)       BP Readings from Last 3 Encounters:   22 134/66   22 134/84   22 130/80         SUBJECTIVE/OBJECTIVE:    New patient seen today for his pharynx. The patient has a long history of cancer. He had part of his nose resected for cancer and now he has a new lesion. He had a recent PET scan which showed a lung lesion and some hypermetabolic activity in his pharynx. Patient denies dysphagia dyne aphasia ear pain. Here today just to make sure there is nothing going on. Review of Systems   Constitutional: Negative. HENT: Negative. Eyes: Negative. Respiratory: Negative. Cardiovascular: Negative. Gastrointestinal: Negative. Endocrine: Negative. Musculoskeletal: Negative. Skin: Negative. Allergic/Immunologic: Negative. Neurological: Negative. Hematological: Negative. Psychiatric/Behavioral: Negative. Physical Exam  Constitutional:       Appearance: Normal appearance. He is normal weight. HENT:      Head: Normocephalic and atraumatic. Right Ear: Tympanic membrane, ear canal and external ear normal.      Left Ear: Tympanic membrane, ear canal and external ear normal.      Nose: Nasal deformity present. Comments: Surgical changes with a cancerous mass on left tip     Mouth/Throat:      Mouth: Mucous membranes are moist.      Pharynx: Oropharynx is clear. Eyes:      Extraocular Movements: Extraocular movements intact. Pupils: Pupils are equal, round, and reactive to light.    Cardiovascular:      Rate and Rhythm: Normal rate and regular rhythm. Pulmonary:      Effort: Pulmonary effort is normal.      Breath sounds: Normal breath sounds. Musculoskeletal:      Cervical back: Normal range of motion. Skin:     General: Skin is warm and dry. Neurological:      General: No focal deficit present. Mental Status: He is alert and oriented to person, place, and time. Psychiatric:         Mood and Affect: Mood normal.         Behavior: Behavior normal.              ASSESSMENT/PLAN:    1. Cancer of skin of external nose  I see no concerning lesions in the back of his throat. I would like to see him back in 3 months to make sure there was not some sort of early metabolic change happening that we will see an increased growth in 3 months. Should he decide what he can do for his nose. I would suggest [address] for excision and reconstruction. He also has a work-up for the lung lesion. Return in about 3 months (around 7/14/2022) for Throat exam.    An electronic signature was used to authenticate this note. Park Alexis MD       Please note that this chart was generated using dragon dictation software. Although every effort was made to ensure the accuracy of this automated transcription, some errors in transcription may have occurred.

## 2022-04-18 ENCOUNTER — TELEPHONE (OUTPATIENT)
Dept: PRIMARY CARE CLINIC | Age: 67
End: 2022-04-18

## 2022-04-18 DIAGNOSIS — R91.1 PULMONARY NODULE: ICD-10-CM

## 2022-04-18 DIAGNOSIS — R93.89 ABNORMAL CT OF THE CHEST: Primary | ICD-10-CM

## 2022-04-18 DIAGNOSIS — K04.7 ABSCESSED TOOTH: Primary | ICD-10-CM

## 2022-04-18 NOTE — PROGRESS NOTES
MEDICAL ONCOLOGY CONSULTATION    Pt Name: Nivia Howe  MRN: [id number]  [place]: 1955  Date of evaluation: 4/19/2022    REASON FOR CONSULTATION:  Lung nodule  REQUESTING PHYSICIAN: CAPO Theodore    History Obtained From:patient and old medical records    HISTORY OF PRESENT ILLNESS:    Diagnosis  · Basal cell carcinoma tip of the nose, Jan 2022  · Squamous cell carcinoma Left superior helix, Jan 2022  · Right upper lobe lung nodule, Feb 2022    Treatment Summary  · Anticipate free flap nasal reconstruction surgery at [address] [place] [address]  · Anticipate radiation therapy to nose    Cancer History  Nivia Howe was first seen by me on 4/19/2022. He was referred by his primary care provider for a suspicious diagnosis of right lung malignancy. In addition, patient has additional findings of basal cell carcinoma and left superior helix, cell carcinoma site. · 1/11/22 Left nasal ala biopsy per Dr. [facility]: Basal cell carcinoma. Left superior helix: squamous cell carcinoma in-situ. · 1/13/22 Transthoracic echo [facility]): Left ventricular ejection fraction appears to be 36 - 40%. Left ventricular systolic function is moderately decreased. The following left ventricular wall segments are akinetic: apical anterior, apical lateral, apical inferior, apical septal and apex. Abnormal global longitudinal LV strain (GLS) = -7%. Estimated right ventricular systolic pressure from tricuspid regurgitation is normal (<35 mmHg). · 2/22/22 CTA chest [facility]): No evidence of pulmonary thromboembolic disease. The thoracic aorta is normal in caliber with no evidence of dissection or aneurysm. There is mixed plaquing involving the proximal segment of the left subclavian artery with mild associated stenosis. Irregular nodule in the right upper lobe concerning for potential neoplasm. There are also some mildly enlarged middle mediastinal nodes. Follow-up with PET/CT is recommended for further assessment.  Small effusions with bibasilar atelectasis. There are changes of centrilobular and paraseptal emphysema. Areas of suspected bronchiolitis/bronchopneumonia are noted within the lower lobes. There is also a focus of nodularity within the right middle lobe which could also be followed up at the time of PET CT to assess for activity. Elevated right heart pressure suspected with reflux of contrast into the intrahepatic IVC. · 4/5/22 PET  FULL BODY  Intense abnormal uptake associated with the previously described nodule in the inferior aspect of the right upper lobe. This should be considered a primary neoplasm of the lung until otherwise proven given its metabolic activity. There is no associated metabolically active hilar or mediastinal adenopathy. No evidence of metabolically active distant metastasis. Mild uptake associated with a small cluster of groundglass nodules in the superior segment of the right lower lobe as well as an area of nodularity within the left lower lobe which is less conspicuous than on a previous exam. Given their low SUV measurements I suspect these are infectious/inflammatory in nature but they would warrant follow-up on subsequent imaging. There is increased metabolic activity associated with the palatine tonsils (left greater than right). I do not see an associated discrete mass but they would warrant follow-up with direct visualization. No additional sites of abnormal uptake are demonstrated. · 4/19/2022he was first seen by me. Discussed results of PET scan. He will be seen by pulmonary tomorrow for further evaluation. He was seen by ENT and was told of no problem with his tonsils. Likely representing physiologic uptake. Essentially, no tissue diagnosis at this time. No evidence of mediastinal involvement or evidence of metastatic disease.     Past Medical History:    Past Medical History:   Diagnosis Date    Cancer ([address]     Septum     CHF (congestive heart failure) (HCC)     Headache  Hypertension        Past Surgical History:    Past Surgical History:   Procedure Laterality Date    NOSE SURGERY  09/2016       Social History:    Marital status:   Smoking status:Former; Quit October 2021  ETOH status:No  Resides: [place]    Family History:   Family History   Problem Relation Age of Onset    Cancer Mother     Coronary Art Dis Father     Coronary Art Dis Brother        Current Hospital Medications:    Current Outpatient Medications   Medication Sig Dispense Refill    butalbital-acetaminophen-caffeine (FIORICET, ESGIC) -40 MG per tablet Take 1 tablet by mouth every 6 hours as needed for Headaches 120 tablet 0    clonazePAM (KLONOPIN) 1 MG tablet Take 1 tablet by mouth 3 times daily as needed for Anxiety for up to 30 days. 90 tablet 0    VENTOLIN  (90 Base) MCG/ACT inhaler Inhale 2 puffs into the lungs every 6 hours as needed for Wheezing 18 g 0    esomeprazole (NEXIUM) 40 MG delayed release capsule Take 1 capsule by mouth daily 30 capsule 11    clopidogrel (PLAVIX) 75 MG tablet Take 75 mg by mouth daily      HYDROcodone-acetaminophen (NORCO) 5-325 MG per tablet Take 1 tablet by mouth every 6 hours as needed for Pain.  cyclobenzaprine (FLEXERIL) 5 MG tablet Take 5 mg by mouth 3 times daily as needed for Muscle spasms      potassium chloride (KLOR-CON) 20 MEQ packet Take 20 mEq by mouth 2 times daily      furosemide (LASIX) 40 MG tablet Take 40 mg by mouth daily      atomoxetine (STRATTERA) 40 MG capsule Take 40 mg by mouth daily      aspirin 81 MG EC tablet Take 81 mg by mouth daily      Metoprolol Succinate 25 MG CS24 Take by mouth      Misc.  Devices (ROLLATOR ULTRA-LIGHT) MISC Use to walk 1 each 0    nicotine (NICODERM CQ) 21 MG/24HR Place 1 patch onto the skin every 24 hours 30 patch 3    omeprazole (PRILOSEC) 40 MG delayed release capsule Take 1 capsule by mouth daily 90 capsule 3    amitriptyline (ELAVIL) 25 MG tablet Take 1 tablet by mouth nightly For headache prevention and sleep 30 tablet 11    Aspirin Buf,[id number]-[id number]-JtKip, (BUFFERED ASPIRIN) 325 MG TABS Take 325 mg by mouth daily       No current facility-administered medications for this visit. Allergies: No Known Allergies      Subjective   REVIEW OF SYSTEMS:   CONSTITUTIONAL: no fever, no night sweats, no fatigue;  HEENT: no blurring of vision, no double vision, no hearing difficulty, no tinnitus, no ulceration, no dysplasia, no epistaxis;  LUNGS: no cough, no hemoptysis, no wheeze,  no shortness of breath;  CARDIOVASCULAR: no palpitation, no chest pain, no shortness of breath;  GI: no abdominal pain, no nausea, no vomiting, no diarrhea, no constipation;  NEAL: no dysuria, no hematuria, no frequency or urgency, no nephrolithiasis;  MUSCULOSKELETAL: no joint pain, no swelling, no stiffness;  ENDOCRINE: no polyuria, no polydipsia, no cold or heat intolerance;  HEMATOLOGY: no easy bruising or bleeding, no history of clotting disorder;  DERMATOLOGY: no skin rash, no eczema, no pruritus;  PSYCHIATRY: no depression, no anxiety, no panic attacks, no suicidal ideation, no homicidal ideation;  NEUROLOGY: no syncope, no seizures, no numbness or tingling of hands, no numbness or tingling of feet, no paresis;    Objective   BP (!) 132/90   Pulse 80   Ht 5' 10\" (1.778 m)   Wt 180 lb 4.8 oz (81.8 kg)   SpO2 97%   BMI 25.87 kg/m²     PHYSICAL EXAM:  CONSTITUTIONAL: Alert, appropriate, no acute distress  EYES: Non icteric, EOM intact, pupils equal round   ENT: Mucus membranes moist, no oral pharyngeal lesions, external inspection of ears and nose are normal  NECK: Supple, no masses. No palpable thyroid mass  CHEST/LUNGS: CTA bilaterally, normal respiratory effort   CARDIOVASCULAR: RRR, no murmurs. No lower extremity edema  ABDOMEN: soft non-tender, active bowel sounds, no HSM. No palpable masses  EXTREMITIES: warm, full ROM in all 4 extremities, no focal weakness.   SKIN: warm, dry with no rashes or lesions  LYMPH: No cervical, clavicular, axillary, or inguinal lymphadenopathy  NEUROLOGIC: follows commands, non focal   PSYCH: mood and affect appropriate. Alert and oriented to time, place, person      LABORATORY RESULTS REVIEWED/ANALYZED BY ME:  4/19/2022 CBC  WBC 8.4  HGB 12.3    Neut 4.8    RADIOLOGY STUDIES REVIEWED BY ME:  As above    My assessment right upper lobe hypermetabolic nodule. No evidence of mediastinal adenopathy. ASSESSMENT:    No orders of the defined types were placed in this encounter. Herbert Cortez was seen today for new patient. Diagnoses and all orders for this visit:    Abnormal CT of the chest    Pulmonary nodule    Care plan discussed with patient    Mass of upper lobe of right lung       RUL hypermetabolic nodule  Essentially, findings of a hypermetabolic right upper lobe nodule. No evidence of mediastinal adenopathy or distant metastasis. Differential diagnosis to include lung cancer in this patient with a significant smoking history.  -Proceed with consultation pulmonary Dr Sukh Nino  -Return to see me after tissue diagnosis    If this is compatible with stage I NSCLC, then surgery or SBRT would be an option. He had open heart surgery in the past and therefore surgery might not be a good option for him. PLAN:  · RTC with MD 4 weeks  · Continue follow-up with Dr. Sukh Nino 4/20/22  · Continue follow-up with [facility] for surgery-if patient decides for this treatment  · Continue follow-up with Dr Brandy Chi for radiation therapy-if patient decides for this treatment      Lloyd ENAMORADO am pre-charting as a registered nurse for Lakshmi Campuzano MD. Electronically signed by Lloyd Ribera RN on 4/19/2022 at 2:21 PM CDT. Vijaya Franco am scribing for Lakshmi Campuzano MD. Electronically signed by Lloyd Ribera RN on 4/19/2022 at 2:12 PM CDT.     I, Dr Flako Mccarthy, personally performed the services described in this documentation as scribed by Lloyd Ribera RN in my presence and is both accurate and complete. I have seen, examined and reviewed this patient medication list, appropriate labs and imaging studies. I reviewed relevant medical records and others physicians notes. I discussed the plans of care with the patient. I answered all the questions to the patients satisfaction. I have also reviewed the chief complaint (CC) and part of the history (History of Present Illness (HPI), Past Family Social History [facility]), or Review of Systems (ROS) and made changes when appropriated. (Please note that portions of this note were completed with a voice recognition program. Efforts were made to edit the dictations but occasionally words are mis-transcribed.)    Electronically signed by Carolyne Hastings MD on 4/19/2022 at 4:07 PM      The total time (45min) I spent to see the patient today includes at least one or more of the following: preparing to see the patient by reviewing prior tests, prior notes or other relevant information, performing appropriate independent examination and evaluation, counseling, ordering of medications, tests or procedures, communicating with other healthcare professionals when appropriated to coordinate care, documenting clinic information in the electronic medical record or other health records, independently interpreting results of tests, managing test results and communicating the results to the patient/family or caregiver.

## 2022-04-19 ENCOUNTER — HOSPITAL ENCOUNTER (OUTPATIENT)
Dept: INFUSION THERAPY | Age: 67
Discharge: HOME | End: 2022-04-19
Payer: MEDICARE

## 2022-04-19 ENCOUNTER — OFFICE VISIT (OUTPATIENT)
Dept: HEMATOLOGY | Age: 67
End: 2022-04-19
Payer: MEDICARE

## 2022-04-19 VITALS
BODY MASS INDEX: 25.81 KG/M2 | SYSTOLIC BLOOD PRESSURE: 132 MMHG | HEIGHT: 70 IN | OXYGEN SATURATION: 97 % | DIASTOLIC BLOOD PRESSURE: 90 MMHG | WEIGHT: 180.3 LBS | HEART RATE: 80 BPM

## 2022-04-19 DIAGNOSIS — R91.8 MASS OF UPPER LOBE OF RIGHT LUNG: ICD-10-CM

## 2022-04-19 DIAGNOSIS — R91.1 PULMONARY NODULE: ICD-10-CM

## 2022-04-19 DIAGNOSIS — R93.89 ABNORMAL CT OF THE CHEST: ICD-10-CM

## 2022-04-19 DIAGNOSIS — R93.89 ABNORMAL CT OF THE CHEST: Primary | ICD-10-CM

## 2022-04-19 DIAGNOSIS — Z71.89 CARE PLAN DISCUSSED WITH PATIENT: ICD-10-CM

## 2022-04-19 LAB
HCT VFR BLD CALC: 39.2 % (ref 40.1–51)
HEMOGLOBIN: 12.3 G/DL (ref 13.7–17.5)
MCH RBC QN AUTO: 28.7 PG (ref 25.7–32.2)
MCHC RBC AUTO-ENTMCNC: 31.4 G/DL (ref 32.3–36.5)
MCV RBC AUTO: 91.4 FL (ref 79–92.2)
PDW BLD-RTO: 15.2 % (ref 11.6–14.4)
PLATELET # BLD: 216 K/UL (ref 163–337)
PMV BLD AUTO: 11.3 FL (ref 7.4–10.4)
RBC # BLD: 4.29 M/UL (ref 4.63–6.08)
WBC # BLD: 8.4 K/UL (ref 4.23–9.07)

## 2022-04-19 PROCEDURE — 99204 OFFICE O/P NEW MOD 45 MIN: CPT | Performed by: INTERNAL MEDICINE

## 2022-04-19 PROCEDURE — 99212 OFFICE O/P EST SF 10 MIN: CPT

## 2022-04-19 PROCEDURE — 85027 COMPLETE CBC AUTOMATED: CPT

## 2022-04-19 RX ORDER — AMOXICILLIN AND CLAVULANATE POTASSIUM 875; 125 MG/1; MG/1
1 TABLET, FILM COATED ORAL 2 TIMES DAILY
Qty: 20 TABLET | Refills: 0 | Status: SHIPPED | OUTPATIENT
Start: 2022-04-19 | End: 2022-04-29

## 2022-04-20 ENCOUNTER — OFFICE VISIT (OUTPATIENT)
Dept: PULMONOLOGY | Age: 67
End: 2022-04-20
Payer: MEDICARE

## 2022-04-20 ENCOUNTER — TELEPHONE (OUTPATIENT)
Dept: PULMONOLOGY | Age: 67
End: 2022-04-20

## 2022-04-20 VITALS
OXYGEN SATURATION: 100 % | WEIGHT: 180.4 LBS | DIASTOLIC BLOOD PRESSURE: 76 MMHG | BODY MASS INDEX: 25.83 KG/M2 | HEIGHT: 70 IN | HEART RATE: 74 BPM | SYSTOLIC BLOOD PRESSURE: 126 MMHG

## 2022-04-20 DIAGNOSIS — R91.1 LUNG NODULE: Primary | ICD-10-CM

## 2022-04-20 DIAGNOSIS — Z87.891 SMOKING HISTORY: ICD-10-CM

## 2022-04-20 DIAGNOSIS — I25.2 HISTORY OF MI (MYOCARDIAL INFARCTION): ICD-10-CM

## 2022-04-20 DIAGNOSIS — Z95.1 HX OF CABG: ICD-10-CM

## 2022-04-20 DIAGNOSIS — Z11.52 ENCOUNTER FOR SCREENING FOR COVID-19: Primary | ICD-10-CM

## 2022-04-20 PROCEDURE — 99204 OFFICE O/P NEW MOD 45 MIN: CPT | Performed by: INTERNAL MEDICINE

## 2022-04-20 ASSESSMENT — ENCOUNTER SYMPTOMS
COUGH: 0
ABDOMINAL PAIN: 0
WHEEZING: 0
CHEST TIGHTNESS: 0
RHINORRHEA: 0
ANAL BLEEDING: 0
BACK PAIN: 0
ABDOMINAL DISTENTION: 0
SHORTNESS OF BREATH: 0
APNEA: 0

## 2022-04-20 NOTE — TELEPHONE ENCOUNTER
Left message for patient to return call to office to verify up coming appointments. Patient needs to hold plavix for 7 days prior to ct needle biopsy and hold aspirin for 2 days prior.

## 2022-04-20 NOTE — PROGRESS NOTES
Pulmonary and Sleep Medicine    Dionicio Class (:  1955) is a 77 y.o. male,New patient, here for evaluation of the following chief complaint(s):  New Patient (Referral for abnormal CT of the chest, Pulmonary nodule.)      Referring physician:  Madie Chung   [address],  [address]     ASSESSMENT/PLAN:  1. Lung nodule  -     CT NEEDLE BIOPSY LUNG PERCUTANEOUS; Future  -     Full PFT Study With Bronchodilator; Future  2. Smoking history  3. Hx of CABG  4. History of MI (myocardial infarction)      CT of the chest done at [facility] and PET/CT was reviewed with the patient. He does have right upper lobe nodule that is amenable to CT-guided biopsy. We will proceed with a CT-guided biopsy at this time. Also will obtain pulmonary function testing. The patient would be a surgical candidate if pulmonary function allows. The patient is on plavix. Kirsten Bundy MD, FCCP, DAB    Return in about 2 weeks (around 2022). SUBJECTIVE/OBJECTIVE:  The patient is here for evaluation of the lung nodule. He was evaluated at [facility] in February of this year. At that time he was diagnosed with congestive heart failure. He underwent a CT of the chest that showed right upper lobe nodule. Subsequently had a PET/CT that was positive in the nodule. I was asked to see him regarding the above. The PET/CT did not show any distant activity or mediastinal activity. The patient had been chronic heavy smoker until September of last year at that time he underwent CABG in [place]. No pulmonary function study on file. Prior to Visit Medications    Medication Sig Taking?  Authorizing Provider   amoxicillin-clavulanate (AUGMENTIN) 875-125 MG per tablet Take 1 tablet by mouth 2 times daily for 10 days Yes MADIE Carrizales   butalbital-acetaminophen-caffeine (FIORICET, ESGIC) -40 MG per tablet Take 1 tablet by mouth every 6 hours as needed for Headaches Yes Trina AUGUSTINE Deras, APRN   clonazePAM (KLONOPIN) 1 MG tablet Take 1 tablet by mouth 3 times daily as needed for Anxiety for up to 30 days. Yes CAPO Izquierdo   VENTOLIN  (90 Base) MCG/ACT inhaler Inhale 2 puffs into the lungs every 6 hours as needed for Wheezing Yes CAPO Bean   esomeprazole ([address]) 40 MG delayed release capsule Take 1 capsule by mouth daily Yes CAPO Gutierrez   clopidogrel (PLAVIX) 75 MG tablet Take 75 mg by mouth daily Yes Historical Provider, MD   HYDROcodone-acetaminophen (NORCO) 5-325 MG per tablet Take 1 tablet by mouth every 6 hours as needed for Pain. Yes Historical Provider, MD   cyclobenzaprine (FLEXERIL) 5 MG tablet Take 5 mg by mouth 3 times daily as needed for Muscle spasms Yes Historical Provider, MD   potassium chloride (KLOR-CON) 20 MEQ packet Take 20 mEq by mouth 2 times daily Yes Historical Provider, MD   furosemide (LASIX) 40 MG tablet Take 40 mg by mouth daily Yes Historical Provider, MD   atomoxetine (STRATTERA) 40 MG capsule Take 40 mg by mouth daily Yes Historical Provider, MD   aspirin 81 MG EC tablet Take 81 mg by mouth daily Yes Historical Provider, MD   Metoprolol Succinate 25 MG CS24 Take by mouth Yes Historical Provider, MD   Misc. Devices (ROLLATOR ULTRA-LIGHT) MIS Use to walk Yes CAPO Izquierdo   nicotine (NICODERM CQ) 21 MG/24HR Place 1 patch onto the skin every 24 hours Yes CAPO Gutierrez   omeprazole (PRILOSEC) 40 MG delayed release capsule Take 1 capsule by mouth daily Yes CAPO Gutierrez   amitriptyline (ELAVIL) 25 MG tablet Take 1 tablet by mouth nightly For headache prevention and sleep Yes CAPO Gutierrez   Aspirin Buf,CoIdc-UcOnv-KwQun, (BUFFERED ASPIRIN) 325 MG TABS Take 325 mg by mouth daily Yes Historical Provider, MD        Review of Systems   Constitutional: Negative for activity change, appetite change, chills, diaphoresis and fatigue.    HENT: Negative for congestion, dental problem, drooling, ear discharge, postnasal drip and rhinorrhea. Eyes: Negative for visual disturbance. Respiratory: Negative for apnea, cough, chest tightness, shortness of breath and wheezing. Gastrointestinal: Negative for abdominal distention, abdominal pain and anal bleeding. Endocrine: Negative for cold intolerance, heat intolerance and polydipsia. Genitourinary: Negative for difficulty urinating, dysuria, enuresis and flank pain. Musculoskeletal: Negative for arthralgias, back pain and gait problem. Allergic/Immunologic: Negative for environmental allergies. Neurological: Negative for dizziness, facial asymmetry, light-headedness and headaches. Vitals:    04/20/22 1408   BP: 126/76   Pulse: 74   SpO2: 100%     Physical Exam  Vitals reviewed. Constitutional:       Appearance: Normal appearance. HENT:      Head: Normocephalic and atraumatic. Nose: Nose normal.   Eyes:      Extraocular Movements: Extraocular movements intact. Conjunctiva/sclera: Conjunctivae normal.   Cardiovascular:      Rate and Rhythm: Normal rate and regular rhythm. Heart sounds: No murmur heard. No friction rub. Pulmonary:      Effort: Pulmonary effort is normal. No respiratory distress. Breath sounds: Normal breath sounds. No stridor. No wheezing, rhonchi or rales. Abdominal:      General: There is no distension. Palpations: There is no mass. Tenderness: There is no abdominal tenderness. There is no guarding or rebound. Musculoskeletal:      Cervical back: Normal range of motion and neck supple. Neurological:      Mental Status: He is alert and oriented to person, place, and time. This note was generated used a voice recognition software. Errors in voice recognition may have occurred. An electronic signature was used to authenticate this note.     --Claudette Balderas MD

## 2022-04-22 DIAGNOSIS — Z11.52 ENCOUNTER FOR SCREENING FOR COVID-19: ICD-10-CM

## 2022-04-22 LAB — SARS-COV-2, PCR: NOT DETECTED

## 2022-04-25 ENCOUNTER — HOSPITAL ENCOUNTER (OUTPATIENT)
Dept: PULMONOLOGY | Age: 67
Discharge: HOME | End: 2022-04-25
Payer: MEDICARE

## 2022-04-25 DIAGNOSIS — R91.1 LUNG NODULE: ICD-10-CM

## 2022-04-25 PROCEDURE — 94727 GAS DIL/WSHOT DETER LNG VOL: CPT | Performed by: INTERNAL MEDICINE

## 2022-04-25 PROCEDURE — 94729 DIFFUSING CAPACITY: CPT | Performed by: INTERNAL MEDICINE

## 2022-04-25 PROCEDURE — 6360000002 HC RX W HCPCS: Performed by: INTERNAL MEDICINE

## 2022-04-25 PROCEDURE — 94729 DIFFUSING CAPACITY: CPT

## 2022-04-25 PROCEDURE — 94060 EVALUATION OF WHEEZING: CPT

## 2022-04-25 PROCEDURE — 94060 EVALUATION OF WHEEZING: CPT | Performed by: INTERNAL MEDICINE

## 2022-04-25 PROCEDURE — 94727 GAS DIL/WSHOT DETER LNG VOL: CPT

## 2022-04-25 RX ORDER — ALBUTEROL SULFATE 2.5 MG/3ML
2.5 SOLUTION RESPIRATORY (INHALATION) EVERY 6 HOURS PRN
Status: DISCONTINUED | OUTPATIENT
Start: 2022-04-25 | End: 2022-04-27 | Stop reason: HOSPADM

## 2022-04-25 RX ADMIN — ALBUTEROL SULFATE 2.5 MG: 2.5 SOLUTION RESPIRATORY (INHALATION) at 10:27

## 2022-04-28 ENCOUNTER — TELEPHONE (OUTPATIENT)
Dept: INTERVENTIONAL RADIOLOGY/VASCULAR | Age: 67
End: 2022-04-28

## 2022-04-28 NOTE — PROGRESS NOTES
Called the patient to confirm the lung biopsy scheduled for 4/29. Patient confirmed apt and I observed a note from Nisha Gruber in Dr Cate Beal office that she gave instruction for the patient to be off asprin for 2 days prior to procedure. Our policy for Asprin 96PB is a 5 day hold. I called the radiologist Dr Latasha Desouza, and he stated if his lab work is normal, he will proceed with the biopsy as scheduled, but will go over risks with the patient. Patient has been off of his Plavix for 5 days. Patient verbalized understanding of registering at the MRI center at 0730.

## 2022-04-29 ENCOUNTER — HOSPITAL ENCOUNTER (OUTPATIENT)
Dept: GENERAL RADIOLOGY | Age: 67
Discharge: HOME | End: 2022-04-29
Payer: MEDICARE

## 2022-04-29 ENCOUNTER — HOSPITAL ENCOUNTER (OUTPATIENT)
Dept: CT IMAGING | Age: 67
Discharge: HOME | End: 2022-04-29
Payer: MEDICARE

## 2022-04-29 VITALS
HEART RATE: 76 BPM | WEIGHT: 180 LBS | OXYGEN SATURATION: 100 % | SYSTOLIC BLOOD PRESSURE: 128 MMHG | BODY MASS INDEX: 25.77 KG/M2 | HEIGHT: 70 IN | DIASTOLIC BLOOD PRESSURE: 76 MMHG | RESPIRATION RATE: 20 BRPM | TEMPERATURE: 97.2 F

## 2022-04-29 DIAGNOSIS — R91.1 LUNG NODULE: ICD-10-CM

## 2022-04-29 LAB
APTT: 27 SEC (ref 26–36.2)
INR BLD: 1.03 (ref 0.88–1.18)
PLATELET # BLD: 219 K/UL (ref 130–400)
PROTHROMBIN TIME: 13.4 SEC (ref 12–14.6)

## 2022-04-29 PROCEDURE — 85610 PROTHROMBIN TIME: CPT

## 2022-04-29 PROCEDURE — 77012 CT SCAN FOR NEEDLE BIOPSY: CPT

## 2022-04-29 PROCEDURE — 85049 AUTOMATED PLATELET COUNT: CPT

## 2022-04-29 PROCEDURE — 88334 PATH CONSLTJ SURG CYTO XM EA: CPT

## 2022-04-29 PROCEDURE — 88305 TISSUE EXAM BY PATHOLOGIST: CPT

## 2022-04-29 PROCEDURE — 71045 X-RAY EXAM CHEST 1 VIEW: CPT

## 2022-04-29 PROCEDURE — 85730 THROMBOPLASTIN TIME PARTIAL: CPT

## 2022-04-29 PROCEDURE — 88333 PATH CONSLTJ SURG CYTO XM 1: CPT

## 2022-04-29 RX ORDER — SODIUM CHLORIDE 0.9 % (FLUSH) 0.9 %
10 SYRINGE (ML) INJECTION EVERY 12 HOURS SCHEDULED
Status: DISCONTINUED | OUTPATIENT
Start: 2022-04-29 | End: 2022-05-01 | Stop reason: HOSPADM

## 2022-04-29 RX ORDER — LANOLIN ALCOHOL/MO/W.PET/CERES
3 CREAM (GRAM) TOPICAL NIGHTLY PRN
Status: ON HOLD | COMMUNITY
End: 2022-05-23

## 2022-04-29 ASSESSMENT — PAIN - FUNCTIONAL ASSESSMENT: PAIN_FUNCTIONAL_ASSESSMENT: NONE - DENIES PAIN

## 2022-04-29 NOTE — PROGRESS NOTES
Patient continues to recover without complications. Vitals remained stable. Puncture site clean and dry on right chest.  Patient has eaten lunch with no complaints of pain or nausea. Discharge instructions reviewed with the patient and he and his daughter verbalized understanding of instruction. 2 hour post biopsy chest xray is completed at 5622 and notified Dr Verner Blumenthal of imaging.

## 2022-04-29 NOTE — PROGRESS NOTES
Mr Dena Gilmore is here today for a ct guided biopsy of right upper lung ordered by Dr Lauren Conklin. Procedure was verified with the patent and he was escorted to [facility] room 1. Patient oriented to room and call light. Procedure was explained to the patient and his daughter and discharge instructions reviewed and both verbalized understanding of instructions. Assessment completed and medications list was updated and cleaned up. Patient did take a 81mg Asprin on Tuesday, but the radiologist Dr Jo Diallo is aware. Iv started a 20 g in right forearm and labs were drawn and sent. H/P on chart dated 4/20/22 and given to pathology for review and radiologist.  Patient denies any allergies. Patient is also on Plavix and has been off of it for 7 days. Vitals stable. Reilly score is 10 at time of admit. patietn alert and oriented and denies pain. Ct techs notified of patient status.

## 2022-04-29 NOTE — PROGRESS NOTES
Patient dressed and wheeled to car with daughter as . Vitals stable at discharge and patient denied pain. Puncture site clean and dry. Reilly score 10.

## 2022-04-29 NOTE — PROGRESS NOTES
Patient tolerated the biopsy well. Vitals stable on return to [facility]. No sedation used for procedure only a local.  Orders received. Puncture site clean and dry. Patient denies pain.   Activity level instructions reviewed with the patient and he verbalized understanding of instruction

## 2022-05-03 ENCOUNTER — OFFICE VISIT (OUTPATIENT)
Dept: PULMONOLOGY | Age: 67
End: 2022-05-03
Payer: MEDICARE

## 2022-05-03 VITALS
OXYGEN SATURATION: 98 % | DIASTOLIC BLOOD PRESSURE: 70 MMHG | BODY MASS INDEX: 24.82 KG/M2 | HEIGHT: 70 IN | HEART RATE: 92 BPM | SYSTOLIC BLOOD PRESSURE: 110 MMHG | WEIGHT: 173.4 LBS

## 2022-05-03 DIAGNOSIS — Z95.1 HX OF CABG: ICD-10-CM

## 2022-05-03 DIAGNOSIS — C34.91 SQUAMOUS CELL CARCINOMA OF RIGHT LUNG (HCC): Primary | ICD-10-CM

## 2022-05-03 DIAGNOSIS — Z87.891 SMOKING HISTORY: ICD-10-CM

## 2022-05-03 DIAGNOSIS — I25.2 HISTORY OF MI (MYOCARDIAL INFARCTION): ICD-10-CM

## 2022-05-03 PROCEDURE — 99214 OFFICE O/P EST MOD 30 MIN: CPT | Performed by: INTERNAL MEDICINE

## 2022-05-03 ASSESSMENT — ENCOUNTER SYMPTOMS
APNEA: 0
RHINORRHEA: 0
ANAL BLEEDING: 0
CHEST TIGHTNESS: 0
BACK PAIN: 0
WHEEZING: 0
SHORTNESS OF BREATH: 0
COUGH: 0
ABDOMINAL PAIN: 0
ABDOMINAL DISTENTION: 0

## 2022-05-03 NOTE — PROGRESS NOTES
Pulmonary and Sleep Medicine    Sheela Ford (:  1955) is a 77 y.o. male,Established patient, here for evaluation of the following chief complaint(s):  Follow-up (Two week follow up post CT Needle Bx and PFT.)      Referring physician:  No referring provider defined for this encounter. ASSESSMENT/PLAN:  1. Squamous cell carcinoma of right lung [facility])  -     Ambulatory Referral To Cardiothoracic Surgery  2. Hx of CABG  3. History of MI (myocardial infarction)  4. Smoking history        Pathology is consistent with squamous cell carcinoma. PET/CT does not show evidence of distant metastatic disease. Will refer to thoracic surgery for evaluation of surgical options. His pulmonary function study shows moderate COPD with an FEV1 of 2 L/min. If not a candidate for surgical option then SBRT would be recommended. Endy Ramos MD, [facility]    Return in about 4 weeks (around 2022). SUBJECTIVE/OBJECTIVE:  The patient is here for follow-up after her lung biopsy. The biopsy was consistent with squamous cell carcinoma. PET scan was reviewed. No evidence of distant metastatic disease. No neurological abnormalities. Will refer to thoracic surgery      Prior to Visit Medications    Medication Sig Taking?  Authorizing Provider   melatonin 3 MG TABS tablet Take 3 mg by mouth nightly as needed Yes Historical Provider, MD   butalbital-acetaminophen-caffeine (FIORICET, ESGIC) -40 MG per tablet Take 1 tablet by mouth every 6 hours as needed for Headaches Yes CAPO Gutierrez   VENTOLIN  (90 Base) MCG/ACT inhaler Inhale 2 puffs into the lungs every 6 hours as needed for Wheezing Yes CAPO Coy   esomeprazole ([address]) 40 MG delayed release capsule Take 1 capsule by mouth daily Yes CAPO Gutierrez   clopidogrel (PLAVIX) 75 MG tablet Take 75 mg by mouth daily Yes Historical Provider, MD   furosemide (LASIX) 40 MG tablet Take 40 mg by mouth daily Yes Historical Provider, MD   atomoxetine (STRATTERA) 40 MG capsule Take 40 mg by mouth daily Patient and daughter stated he has never taken this Yes Historical Provider, MD   aspirin 81 MG EC tablet Take 81 mg by mouth daily Yes Historical Provider, MD   Metoprolol Succinate 25 MG CS24 Take by mouth Yes Historical Provider, MD   Misc. Devices (ROLLATOR ULTRA-LIGHT) [facility] Use to walk Yes Verito Deras APRN   omeprazole (PRILOSEC) 40 MG delayed release capsule Take 1 capsule by mouth daily Yes CAPO Gutierrez   amitriptyline (ELAVIL) 25 MG tablet Take 1 tablet by mouth nightly For headache prevention and sleep Yes Chio Deras APRSAJAN   clonazePAM (KLONOPIN) 1 MG tablet Take 1 tablet by mouth 3 times daily as needed for Anxiety for up to 30 days. [place] Deras APRN        Review of Systems   Constitutional: Negative for activity change, appetite change, chills, diaphoresis and fatigue. HENT: Negative for congestion, dental problem, drooling, ear discharge, postnasal drip and rhinorrhea. Eyes: Negative for visual disturbance. Respiratory: Negative for apnea, cough, chest tightness, shortness of breath and wheezing. Gastrointestinal: Negative for abdominal distention, abdominal pain and anal bleeding. Endocrine: Negative for cold intolerance, heat intolerance and polydipsia. Genitourinary: Negative for difficulty urinating, dysuria, enuresis and flank pain. Musculoskeletal: Negative for arthralgias, back pain and gait problem. Allergic/Immunologic: Negative for environmental allergies. Neurological: Negative for dizziness, facial asymmetry, light-headedness and headaches. Vitals:    05/03/22 1047   BP: 110/70   Pulse: 92   SpO2: 98%     Physical Exam  Vitals reviewed. Constitutional:       Appearance: Normal appearance. HENT:      Head: Normocephalic and atraumatic. Nose: Nose normal.   Eyes:      Extraocular Movements: Extraocular movements intact.       Conjunctiva/sclera: Conjunctivae normal.   Cardiovascular:      Rate and Rhythm: Normal rate and regular rhythm. Heart sounds: No murmur heard. No friction rub. Pulmonary:      Effort: Pulmonary effort is normal. No respiratory distress. Breath sounds: Normal breath sounds. No stridor. No wheezing, rhonchi or rales. Abdominal:      General: There is no distension. Palpations: There is no mass. Tenderness: There is no abdominal tenderness. There is no guarding or rebound. Musculoskeletal:      Cervical back: Normal range of motion and neck supple. Neurological:      Mental Status: He is alert and oriented to person, place, and time. This note was generated used a voice recognition software. Errors in voice recognition may have occurred. An electronic signature was used to authenticate this note.     --Nidia Ornelas MD

## 2022-05-11 ENCOUNTER — OFFICE VISIT (OUTPATIENT)
Dept: CARDIOTHORACIC SURGERY | Age: 67
End: 2022-05-11
Payer: MEDICARE

## 2022-05-11 VITALS
HEART RATE: 86 BPM | DIASTOLIC BLOOD PRESSURE: 76 MMHG | SYSTOLIC BLOOD PRESSURE: 113 MMHG | OXYGEN SATURATION: 95 % | RESPIRATION RATE: 18 BRPM | HEIGHT: 70 IN | BODY MASS INDEX: 25.22 KG/M2 | WEIGHT: 176.2 LBS

## 2022-05-11 DIAGNOSIS — C34.11 MALIGNANT NEOPLASM OF UPPER LOBE OF RIGHT LUNG (HCC): Primary | ICD-10-CM

## 2022-05-11 PROCEDURE — 99204 OFFICE O/P NEW MOD 45 MIN: CPT | Performed by: SURGERY

## 2022-05-11 RX ORDER — SPIRONOLACTONE 25 MG/1
25 TABLET ORAL DAILY
Status: ON HOLD | COMMUNITY
End: 2022-05-23

## 2022-05-11 RX ORDER — SACUBITRIL AND VALSARTAN 24; 26 MG/1; MG/1
1 TABLET, FILM COATED ORAL 2 TIMES DAILY
Status: ON HOLD | COMMUNITY
End: 2022-05-23

## 2022-05-11 RX ORDER — MAGNESIUM GLUCONATE 27 MG(500)
250 TABLET ORAL DAILY
Status: ON HOLD | COMMUNITY
End: 2022-05-23

## 2022-05-11 ASSESSMENT — ENCOUNTER SYMPTOMS
SHORTNESS OF BREATH: 0
COUGH: 0
CONSTIPATION: 0
CHEST TIGHTNESS: 0
NAUSEA: 0
BACK PAIN: 0
VOMITING: 0
DIARRHEA: 0
ABDOMINAL PAIN: 0

## 2022-05-11 NOTE — PROGRESS NOTES
Subjective:      Patient ID: Eunice Bower is a 77 y.o. Anastasia Jeong presents with a history of RUL nodule, PET+, cancer dx on biopsy. HPI  Patient with past h/o smoking who was noted to have fluid build up after CABG and underwent chest CT scan. This revealed a RUL nodule 2cm diameter. PET scan was positive with no regional or systemic spread noted. RUL biopsy was positive for squamous cell cancer. CT surgery consulted for consideration of robotic RULobectomy. Review of Systems   Constitutional: Negative for activity change, appetite change, chills, diaphoresis, fatigue, fever and unexpected weight change. HENT: Negative for dental problem. Eyes: Negative for visual disturbance. Respiratory: Negative for cough, chest tightness and shortness of breath. No hoarseness. No hemoptysis. Cardiovascular: Negative for chest pain, palpitations and leg swelling. No orthopnea or PND. Gastrointestinal: Negative for abdominal pain, constipation, diarrhea, nausea and vomiting. Genitourinary: Negative for dysuria, frequency, hematuria and urgency. Musculoskeletal: Negative for back pain and joint swelling. Skin: Negative. Neurological: Negative for dizziness, seizures, syncope, light-headedness and headaches. Psychiatric/Behavioral: Negative for confusion and hallucinations. The patient is not nervous/anxious. Objective:   Physical Exam  Constitutional:       Appearance: Normal appearance. HENT:      Nose: Nose normal.   Eyes:      Pupils: Pupils are equal, round, and reactive to light. Cardiovascular:      Rate and Rhythm: Normal rate and regular rhythm. Pulses: Normal pulses. Pulmonary:      Breath sounds: Normal breath sounds. Abdominal:      Palpations: Abdomen is soft. Musculoskeletal:         General: Normal range of motion. Cervical back: Normal range of motion. Skin:     General: Skin is warm.       Capillary Refill: Capillary refill takes less than 2 seconds. Neurological:      General: No focal deficit present. Mental Status: He is alert and oriented to person, place, and time. Psychiatric:         Behavior: Behavior normal.         Assessment:      76 yo man with h/o smoking who presents with RUL squamous cell cancer. It appears to be stage 1 (2cm nodule with no regional spread). He is therefore indicated for RULobectomy in order to provide definitive therapy for the lesion. He seems to be a reasonable candidate for robotic RULobectomy given PFTs which suggest he would tolerate the resection without undue risk for postop respiratory failure. I explained to the patient and his daughter the risks and benefits of surgery in general and robotics in particular. It will be an advantage for addressing the likely adhesions that will be present from the prior CABG as compared to an open thoracotomy. It will also help with his recovery time and therefore ability to tolerate aggressive respiratory exercises.       Plan:      Preop clinic Thursday  Robotic RUL surgery Friday        Morteza Regan MD

## 2022-05-19 ENCOUNTER — HOSPITAL ENCOUNTER (OUTPATIENT)
Dept: PREADMISSION TESTING | Age: 67
Discharge: HOME | End: 2022-05-23
Payer: MEDICARE

## 2022-05-19 ENCOUNTER — HOSPITAL ENCOUNTER (OUTPATIENT)
Dept: GENERAL RADIOLOGY | Age: 67
Discharge: HOME | End: 2022-05-19
Payer: MEDICARE

## 2022-05-19 VITALS — HEIGHT: 70 IN | BODY MASS INDEX: 25.34 KG/M2 | WEIGHT: 177 LBS

## 2022-05-19 DIAGNOSIS — C34.11 MALIGNANT NEOPLASM OF UPPER LOBE OF RIGHT LUNG (HCC): Primary | ICD-10-CM

## 2022-05-19 DIAGNOSIS — I49.8 PRE-OPERATIVE CARDIOVASCULAR EXAMINATION, BRADYARRHYTHMIA: ICD-10-CM

## 2022-05-19 DIAGNOSIS — C34.11 MALIGNANT NEOPLASM OF UPPER LOBE OF RIGHT LUNG (HCC): ICD-10-CM

## 2022-05-19 DIAGNOSIS — Z01.810 PRE-OPERATIVE CARDIOVASCULAR EXAMINATION, BRADYARRHYTHMIA: ICD-10-CM

## 2022-05-19 LAB
ABO/RH: NORMAL
ALBUMIN SERPL-MCNC: 4.4 G/DL (ref 3.5–5.2)
ALP BLD-CCNC: 71 U/L (ref 40–130)
ALT SERPL-CCNC: 15 U/L (ref 5–41)
ANION GAP SERPL CALCULATED.3IONS-SCNC: 12 MMOL/L (ref 7–19)
ANTIBODY SCREEN: NORMAL
AST SERPL-CCNC: 18 U/L (ref 5–40)
BASOPHILS ABSOLUTE: 0.2 K/UL (ref 0–0.2)
BASOPHILS RELATIVE PERCENT: 2.4 % (ref 0–1)
BILIRUB SERPL-MCNC: 0.4 MG/DL (ref 0.2–1.2)
BILIRUBIN URINE: NEGATIVE
BLOOD, URINE: NEGATIVE
BUN BLDV-MCNC: 14 MG/DL (ref 8–23)
CALCIUM SERPL-MCNC: 9.8 MG/DL (ref 8.8–10.2)
CHLORIDE BLD-SCNC: 99 MMOL/L (ref 98–111)
CLARITY: CLEAR
CO2: 26 MMOL/L (ref 22–29)
COLOR: YELLOW
CREAT SERPL-MCNC: 1.1 MG/DL (ref 0.5–1.2)
EOSINOPHILS ABSOLUTE: 0.4 K/UL (ref 0–0.6)
EOSINOPHILS RELATIVE PERCENT: 5.1 % (ref 0–5)
GFR AFRICAN AMERICAN: >59
GFR NON-AFRICAN AMERICAN: >60
GLUCOSE BLD-MCNC: 111 MG/DL (ref 74–109)
GLUCOSE URINE: NEGATIVE MG/DL
HCT VFR BLD CALC: 43.2 % (ref 42–52)
HEMOGLOBIN: 13.6 G/DL (ref 14–18)
IMMATURE GRANULOCYTES #: 0 K/UL
INR BLD: 1.05 (ref 0.88–1.18)
KETONES, URINE: NEGATIVE MG/DL
LEUKOCYTE ESTERASE, URINE: NEGATIVE
LYMPHOCYTES ABSOLUTE: 2 K/UL (ref 1.1–4.5)
LYMPHOCYTES RELATIVE PERCENT: 27.5 % (ref 20–40)
MCH RBC QN AUTO: 29.6 PG (ref 27–31)
MCHC RBC AUTO-ENTMCNC: 31.5 G/DL (ref 33–37)
MCV RBC AUTO: 93.9 FL (ref 80–94)
MONOCYTES ABSOLUTE: 0.6 K/UL (ref 0–0.9)
MONOCYTES RELATIVE PERCENT: 8.8 % (ref 0–10)
MRSA SCREEN RT-PCR: NOT DETECTED
NEUTROPHILS ABSOLUTE: 4 K/UL (ref 1.5–7.5)
NEUTROPHILS RELATIVE PERCENT: 55.9 % (ref 50–65)
NITRITE, URINE: NEGATIVE
PDW BLD-RTO: 15.7 % (ref 11.5–14.5)
PH UA: 7 (ref 5–8)
PLATELET # BLD: 204 K/UL (ref 130–400)
PMV BLD AUTO: 12.1 FL (ref 9.4–12.4)
POTASSIUM SERPL-SCNC: 4.1 MMOL/L (ref 3.5–5)
PROTEIN UA: NEGATIVE MG/DL
PROTHROMBIN TIME: 13.6 SEC (ref 12–14.6)
RBC # BLD: 4.6 M/UL (ref 4.7–6.1)
SODIUM BLD-SCNC: 137 MMOL/L (ref 136–145)
SPECIFIC GRAVITY UA: 1 (ref 1–1.03)
TOTAL PROTEIN: 7.4 G/DL (ref 6.6–8.7)
UROBILINOGEN, URINE: 0.2 E.U./DL
WBC # BLD: 7.1 K/UL (ref 4.8–10.8)

## 2022-05-19 PROCEDURE — 87641 MR-STAPH DNA AMP PROBE: CPT

## 2022-05-19 PROCEDURE — 80053 COMPREHEN METABOLIC PANEL: CPT

## 2022-05-19 PROCEDURE — 85025 COMPLETE CBC W/AUTO DIFF WBC: CPT

## 2022-05-19 PROCEDURE — 86900 BLOOD TYPING SEROLOGIC ABO: CPT

## 2022-05-19 PROCEDURE — 85610 PROTHROMBIN TIME: CPT

## 2022-05-19 PROCEDURE — 93005 ELECTROCARDIOGRAM TRACING: CPT

## 2022-05-19 PROCEDURE — 6370000000 HC RX 637 (ALT 250 FOR IP): Performed by: SURGERY

## 2022-05-19 PROCEDURE — 81003 URINALYSIS AUTO W/O SCOPE: CPT

## 2022-05-19 PROCEDURE — 86850 RBC ANTIBODY SCREEN: CPT

## 2022-05-19 PROCEDURE — 86901 BLOOD TYPING SEROLOGIC RH(D): CPT

## 2022-05-19 PROCEDURE — 71046 X-RAY EXAM CHEST 2 VIEWS: CPT

## 2022-05-19 RX ADMIN — MUPIROCIN: 20 OINTMENT TOPICAL at 09:19

## 2022-05-20 LAB
EKG P AXIS: 71 DEGREES
EKG P-R INTERVAL: 164 MS
EKG Q-T INTERVAL: 380 MS
EKG QRS DURATION: 96 MS
EKG QTC CALCULATION (BAZETT): 402 MS
EKG T AXIS: 121 DEGREES

## 2022-05-23 ENCOUNTER — ANESTHESIA (OUTPATIENT)
Dept: OPERATING ROOM | Age: 67
DRG: 164 | End: 2022-05-23
Payer: MEDICARE

## 2022-05-23 ENCOUNTER — ANESTHESIA EVENT (OUTPATIENT)
Dept: OPERATING ROOM | Age: 67
DRG: 164 | End: 2022-05-23
Payer: MEDICARE

## 2022-05-23 ENCOUNTER — APPOINTMENT (OUTPATIENT)
Dept: GENERAL RADIOLOGY | Age: 67
DRG: 164 | End: 2022-05-23
Attending: SURGERY
Payer: MEDICARE

## 2022-05-23 ENCOUNTER — HOSPITAL ENCOUNTER (INPATIENT)
Age: 67
LOS: 2 days | Discharge: HOME | DRG: 164 | End: 2022-05-25
Attending: SURGERY | Admitting: SURGERY
Payer: MEDICARE

## 2022-05-23 DIAGNOSIS — C34.11 MALIGNANT NEOPLASM OF UPPER LOBE OF RIGHT LUNG (HCC): ICD-10-CM

## 2022-05-23 DIAGNOSIS — Z98.890 STATUS POST THORACOTOMY: Primary | ICD-10-CM

## 2022-05-23 PROBLEM — R91.8 LUNG MASS: Status: ACTIVE | Noted: 2022-05-23

## 2022-05-23 LAB
ABO/RH: NORMAL
ANTIBODY SCREEN: NORMAL

## 2022-05-23 PROCEDURE — 88305 TISSUE EXAM BY PATHOLOGIST: CPT

## 2022-05-23 PROCEDURE — 2709999900 HC NON-CHARGEABLE SUPPLY: Performed by: SURGERY

## 2022-05-23 PROCEDURE — 7100000001 HC PACU RECOVERY - ADDTL 15 MIN: Performed by: SURGERY

## 2022-05-23 PROCEDURE — C1729 CATH, DRAINAGE: HCPCS | Performed by: SURGERY

## 2022-05-23 PROCEDURE — 6370000000 HC RX 637 (ALT 250 FOR IP): Performed by: SURGERY

## 2022-05-23 PROCEDURE — 3700000001 HC ADD 15 MINUTES (ANESTHESIA): Performed by: SURGERY

## 2022-05-23 PROCEDURE — 93005 ELECTROCARDIOGRAM TRACING: CPT | Performed by: ANESTHESIOLOGY

## 2022-05-23 PROCEDURE — 2700000000 HC OXYGEN THERAPY PER DAY

## 2022-05-23 PROCEDURE — 6360000002 HC RX W HCPCS: Performed by: ANESTHESIOLOGY

## 2022-05-23 PROCEDURE — 2500000003 HC RX 250 WO HCPCS: Performed by: SURGERY

## 2022-05-23 PROCEDURE — 6370000000 HC RX 637 (ALT 250 FOR IP): Performed by: ANESTHESIOLOGY

## 2022-05-23 PROCEDURE — 94640 AIRWAY INHALATION TREATMENT: CPT

## 2022-05-23 PROCEDURE — 2580000003 HC RX 258: Performed by: NURSE ANESTHETIST, CERTIFIED REGISTERED

## 2022-05-23 PROCEDURE — 2720000010 HC SURG SUPPLY STERILE: Performed by: SURGERY

## 2022-05-23 PROCEDURE — 99222 1ST HOSP IP/OBS MODERATE 55: CPT | Performed by: SURGERY

## 2022-05-23 PROCEDURE — 2500000003 HC RX 250 WO HCPCS: Performed by: ANESTHESIOLOGY

## 2022-05-23 PROCEDURE — C1725 CATH, TRANSLUMIN NON-LASER: HCPCS | Performed by: SURGERY

## 2022-05-23 PROCEDURE — 2500000003 HC RX 250 WO HCPCS: Performed by: NURSE ANESTHETIST, CERTIFIED REGISTERED

## 2022-05-23 PROCEDURE — 3600000005 HC SURGERY LEVEL 5 BASE: Performed by: SURGERY

## 2022-05-23 PROCEDURE — 86850 RBC ANTIBODY SCREEN: CPT

## 2022-05-23 PROCEDURE — 6360000002 HC RX W HCPCS: Performed by: SURGERY

## 2022-05-23 PROCEDURE — 71045 X-RAY EXAM CHEST 1 VIEW: CPT

## 2022-05-23 PROCEDURE — 3700000000 HC ANESTHESIA ATTENDED CARE: Performed by: SURGERY

## 2022-05-23 PROCEDURE — 2580000003 HC RX 258: Performed by: SURGERY

## 2022-05-23 PROCEDURE — 3600000015 HC SURGERY LEVEL 5 ADDTL 15MIN: Performed by: SURGERY

## 2022-05-23 PROCEDURE — 86900 BLOOD TYPING SEROLOGIC ABO: CPT

## 2022-05-23 PROCEDURE — 7100000000 HC PACU RECOVERY - FIRST 15 MIN: Performed by: SURGERY

## 2022-05-23 PROCEDURE — 86901 BLOOD TYPING SEROLOGIC RH(D): CPT

## 2022-05-23 PROCEDURE — 07B50ZX EXCISION OF RIGHT AXILLARY LYMPHATIC, OPEN APPROACH, DIAGNOSTIC: ICD-10-PCS | Performed by: SURGERY

## 2022-05-23 PROCEDURE — 2580000003 HC RX 258: Performed by: ANESTHESIOLOGY

## 2022-05-23 PROCEDURE — 88309 TISSUE EXAM BY PATHOLOGIST: CPT

## 2022-05-23 PROCEDURE — 2000000000 HC ICU R&B

## 2022-05-23 PROCEDURE — 62325 NJX INTERLAMINAR CRV/THRC: CPT | Performed by: NURSE ANESTHETIST, CERTIFIED REGISTERED

## 2022-05-23 PROCEDURE — 8E0W4CZ ROBOTIC ASSISTED PROCEDURE OF TRUNK REGION, PERCUTANEOUS ENDOSCOPIC APPROACH: ICD-10-PCS | Performed by: SURGERY

## 2022-05-23 PROCEDURE — 0BBC4ZZ EXCISION OF RIGHT UPPER LUNG LOBE, PERCUTANEOUS ENDOSCOPIC APPROACH: ICD-10-PCS | Performed by: SURGERY

## 2022-05-23 PROCEDURE — 6360000002 HC RX W HCPCS: Performed by: NURSE ANESTHETIST, CERTIFIED REGISTERED

## 2022-05-23 RX ORDER — CEFAZOLIN SODIUM 1 G/3ML
INJECTION, POWDER, FOR SOLUTION INTRAMUSCULAR; INTRAVENOUS PRN
Status: DISCONTINUED | OUTPATIENT
Start: 2022-05-23 | End: 2022-05-23 | Stop reason: SDUPTHER

## 2022-05-23 RX ORDER — MEPERIDINE HYDROCHLORIDE 25 MG/ML
12.5 INJECTION INTRAMUSCULAR; INTRAVENOUS; SUBCUTANEOUS EVERY 5 MIN PRN
Status: DISCONTINUED | OUTPATIENT
Start: 2022-05-23 | End: 2022-05-23 | Stop reason: HOSPADM

## 2022-05-23 RX ORDER — IPRATROPIUM BROMIDE AND ALBUTEROL SULFATE 2.5; .5 MG/3ML; MG/3ML
1 SOLUTION RESPIRATORY (INHALATION)
Status: DISCONTINUED | OUTPATIENT
Start: 2022-05-23 | End: 2022-05-25 | Stop reason: HOSPADM

## 2022-05-23 RX ORDER — NOREPINEPHRINE BIT/0.9 % NACL 16MG/250ML
1-100 INFUSION BOTTLE (ML) INTRAVENOUS CONTINUOUS
Status: DISCONTINUED | OUTPATIENT
Start: 2022-05-23 | End: 2022-05-24

## 2022-05-23 RX ORDER — MIDAZOLAM HYDROCHLORIDE 1 MG/ML
2 INJECTION INTRAMUSCULAR; INTRAVENOUS
Status: COMPLETED | OUTPATIENT
Start: 2022-05-23 | End: 2022-05-23

## 2022-05-23 RX ORDER — METOCLOPRAMIDE HYDROCHLORIDE 5 MG/ML
10 INJECTION INTRAMUSCULAR; INTRAVENOUS
Status: DISCONTINUED | OUTPATIENT
Start: 2022-05-23 | End: 2022-05-23 | Stop reason: HOSPADM

## 2022-05-23 RX ORDER — ONDANSETRON 2 MG/ML
4 INJECTION INTRAMUSCULAR; INTRAVENOUS EVERY 6 HOURS PRN
Status: DISCONTINUED | OUTPATIENT
Start: 2022-05-23 | End: 2022-05-23 | Stop reason: SDUPTHER

## 2022-05-23 RX ORDER — SODIUM CHLORIDE 450 MG/100ML
INJECTION, SOLUTION INTRAVENOUS CONTINUOUS
Status: DISCONTINUED | OUTPATIENT
Start: 2022-05-23 | End: 2022-05-24

## 2022-05-23 RX ORDER — ALBUTEROL SULFATE 2.5 MG/3ML
2.5 SOLUTION RESPIRATORY (INHALATION)
Status: DISCONTINUED | OUTPATIENT
Start: 2022-05-23 | End: 2022-05-23

## 2022-05-23 RX ORDER — LIDOCAINE HYDROCHLORIDE 10 MG/ML
1 INJECTION, SOLUTION EPIDURAL; INFILTRATION; INTRACAUDAL; PERINEURAL
Status: DISCONTINUED | OUTPATIENT
Start: 2022-05-23 | End: 2022-05-23 | Stop reason: HOSPADM

## 2022-05-23 RX ORDER — FAMOTIDINE 20 MG/1
20 TABLET, FILM COATED ORAL ONCE
Status: DISCONTINUED | OUTPATIENT
Start: 2022-05-23 | End: 2022-05-23 | Stop reason: HOSPADM

## 2022-05-23 RX ORDER — DIPHENHYDRAMINE HYDROCHLORIDE 50 MG/ML
12.5 INJECTION INTRAMUSCULAR; INTRAVENOUS
Status: DISCONTINUED | OUTPATIENT
Start: 2022-05-23 | End: 2022-05-23 | Stop reason: HOSPADM

## 2022-05-23 RX ORDER — SODIUM CHLORIDE, SODIUM LACTATE, POTASSIUM CHLORIDE, CALCIUM CHLORIDE 600; 310; 30; 20 MG/100ML; MG/100ML; MG/100ML; MG/100ML
INJECTION, SOLUTION INTRAVENOUS CONTINUOUS
Status: DISCONTINUED | OUTPATIENT
Start: 2022-05-23 | End: 2022-05-23 | Stop reason: HOSPADM

## 2022-05-23 RX ORDER — OXYCODONE HYDROCHLORIDE 5 MG/1
10 TABLET ORAL EVERY 4 HOURS PRN
Status: DISCONTINUED | OUTPATIENT
Start: 2022-05-23 | End: 2022-05-25 | Stop reason: HOSPADM

## 2022-05-23 RX ORDER — CLONAZEPAM 1 MG/1
1 TABLET ORAL 3 TIMES DAILY PRN
Status: DISCONTINUED | OUTPATIENT
Start: 2022-05-23 | End: 2022-05-25 | Stop reason: HOSPADM

## 2022-05-23 RX ORDER — ONDANSETRON 2 MG/ML
4 INJECTION INTRAMUSCULAR; INTRAVENOUS EVERY 6 HOURS PRN
Status: DISCONTINUED | OUTPATIENT
Start: 2022-05-23 | End: 2022-05-25 | Stop reason: HOSPADM

## 2022-05-23 RX ORDER — MORPHINE SULFATE 4 MG/ML
4 INJECTION, SOLUTION INTRAMUSCULAR; INTRAVENOUS EVERY 5 MIN PRN
Status: DISCONTINUED | OUTPATIENT
Start: 2022-05-23 | End: 2022-05-23 | Stop reason: HOSPADM

## 2022-05-23 RX ORDER — SODIUM CHLORIDE 0.9 % (FLUSH) 0.9 %
5-40 SYRINGE (ML) INJECTION EVERY 12 HOURS SCHEDULED
Status: DISCONTINUED | OUTPATIENT
Start: 2022-05-23 | End: 2022-05-25 | Stop reason: HOSPADM

## 2022-05-23 RX ORDER — ENOXAPARIN SODIUM 100 MG/ML
40 INJECTION SUBCUTANEOUS DAILY
Status: DISCONTINUED | OUTPATIENT
Start: 2022-05-23 | End: 2022-05-25 | Stop reason: HOSPADM

## 2022-05-23 RX ORDER — LIDOCAINE HYDROCHLORIDE 10 MG/ML
INJECTION, SOLUTION EPIDURAL; INFILTRATION; INTRACAUDAL; PERINEURAL PRN
Status: DISCONTINUED | OUTPATIENT
Start: 2022-05-23 | End: 2022-05-23 | Stop reason: SDUPTHER

## 2022-05-23 RX ORDER — MORPHINE SULFATE 2 MG/ML
2 INJECTION, SOLUTION INTRAMUSCULAR; INTRAVENOUS EVERY 5 MIN PRN
Status: DISCONTINUED | OUTPATIENT
Start: 2022-05-23 | End: 2022-05-23 | Stop reason: HOSPADM

## 2022-05-23 RX ORDER — BUPIVACAINE HYDROCHLORIDE 2.5 MG/ML
INJECTION, SOLUTION EPIDURAL; INFILTRATION; INTRACAUDAL PRN
Status: DISCONTINUED | OUTPATIENT
Start: 2022-05-23 | End: 2022-05-23 | Stop reason: SDUPTHER

## 2022-05-23 RX ORDER — SODIUM CHLORIDE 9 MG/ML
INJECTION, SOLUTION INTRAVENOUS PRN
Status: DISCONTINUED | OUTPATIENT
Start: 2022-05-23 | End: 2022-05-25 | Stop reason: HOSPADM

## 2022-05-23 RX ORDER — ONDANSETRON 4 MG/1
4 TABLET, ORALLY DISINTEGRATING ORAL EVERY 8 HOURS PRN
Status: DISCONTINUED | OUTPATIENT
Start: 2022-05-23 | End: 2022-05-24

## 2022-05-23 RX ORDER — KETOROLAC TROMETHAMINE 30 MG/ML
15 INJECTION, SOLUTION INTRAMUSCULAR; INTRAVENOUS EVERY 6 HOURS PRN
Status: DISCONTINUED | OUTPATIENT
Start: 2022-05-23 | End: 2022-05-25 | Stop reason: HOSPADM

## 2022-05-23 RX ORDER — BUTALBITAL, ACETAMINOPHEN AND CAFFEINE 50; 325; 40 MG/1; MG/1; MG/1
1 TABLET ORAL EVERY 6 HOURS PRN
Status: DISCONTINUED | OUTPATIENT
Start: 2022-05-23 | End: 2022-05-25 | Stop reason: HOSPADM

## 2022-05-23 RX ORDER — MORPHINE SULFATE 2 MG/ML
2 INJECTION, SOLUTION INTRAMUSCULAR; INTRAVENOUS
Status: DISCONTINUED | OUTPATIENT
Start: 2022-05-23 | End: 2022-05-24

## 2022-05-23 RX ORDER — ROCURONIUM BROMIDE 10 MG/ML
INJECTION, SOLUTION INTRAVENOUS PRN
Status: DISCONTINUED | OUTPATIENT
Start: 2022-05-23 | End: 2022-05-23 | Stop reason: SDUPTHER

## 2022-05-23 RX ORDER — PROPOFOL 10 MG/ML
INJECTION, EMULSION INTRAVENOUS PRN
Status: DISCONTINUED | OUTPATIENT
Start: 2022-05-23 | End: 2022-05-23 | Stop reason: SDUPTHER

## 2022-05-23 RX ORDER — PANTOPRAZOLE SODIUM 40 MG/1
40 TABLET, DELAYED RELEASE ORAL
Status: DISCONTINUED | OUTPATIENT
Start: 2022-05-24 | End: 2022-05-25 | Stop reason: HOSPADM

## 2022-05-23 RX ORDER — SUFENTANIL CITRATE 50 UG/ML
INJECTION EPIDURAL; INTRAVENOUS PRN
Status: DISCONTINUED | OUTPATIENT
Start: 2022-05-23 | End: 2022-05-23 | Stop reason: SDUPTHER

## 2022-05-23 RX ORDER — NALOXONE HYDROCHLORIDE 0.4 MG/ML
INJECTION, SOLUTION INTRAMUSCULAR; INTRAVENOUS; SUBCUTANEOUS PRN
Status: DISCONTINUED | OUTPATIENT
Start: 2022-05-23 | End: 2022-05-24

## 2022-05-23 RX ORDER — SODIUM CHLORIDE 0.9 % (FLUSH) 0.9 %
5-40 SYRINGE (ML) INJECTION PRN
Status: DISCONTINUED | OUTPATIENT
Start: 2022-05-23 | End: 2022-05-25 | Stop reason: HOSPADM

## 2022-05-23 RX ORDER — SCOLOPAMINE TRANSDERMAL SYSTEM 1 MG/1
1 PATCH, EXTENDED RELEASE TRANSDERMAL
Status: DISCONTINUED | OUTPATIENT
Start: 2022-05-23 | End: 2022-05-23

## 2022-05-23 RX ORDER — OXYCODONE HYDROCHLORIDE 5 MG/1
5 TABLET ORAL EVERY 4 HOURS PRN
Status: DISCONTINUED | OUTPATIENT
Start: 2022-05-23 | End: 2022-05-25 | Stop reason: HOSPADM

## 2022-05-23 RX ADMIN — SUGAMMADEX 200 MG: 100 INJECTION, SOLUTION INTRAVENOUS at 11:13

## 2022-05-23 RX ADMIN — PHENYLEPHRINE HYDROCHLORIDE 80 MCG: 10 INJECTION INTRAVENOUS at 08:00

## 2022-05-23 RX ADMIN — KETOROLAC TROMETHAMINE 15 MG: 30 INJECTION, SOLUTION INTRAMUSCULAR at 21:13

## 2022-05-23 RX ADMIN — BUPIVACAINE HYDROCHLORIDE 3 ML: 2.5 INJECTION, SOLUTION EPIDURAL; INFILTRATION; INTRACAUDAL; PERINEURAL at 11:37

## 2022-05-23 RX ADMIN — IPRATROPIUM BROMIDE AND ALBUTEROL SULFATE 1 AMPULE: 2.5; .5 SOLUTION RESPIRATORY (INHALATION) at 18:18

## 2022-05-23 RX ADMIN — BUPIVACAINE HYDROCHLORIDE: 5 INJECTION, SOLUTION EPIDURAL; INTRACAUDAL at 11:55

## 2022-05-23 RX ADMIN — ROCURONIUM BROMIDE 10 MG: 10 INJECTION, SOLUTION INTRAVENOUS at 08:53

## 2022-05-23 RX ADMIN — SODIUM CHLORIDE, PRESERVATIVE FREE 10 ML: 5 INJECTION INTRAVENOUS at 20:18

## 2022-05-23 RX ADMIN — MIDAZOLAM 2 MG: 1 INJECTION, SOLUTION INTRAMUSCULAR; INTRAVENOUS at 07:16

## 2022-05-23 RX ADMIN — SODIUM CHLORIDE, SODIUM LACTATE, POTASSIUM CHLORIDE, AND CALCIUM CHLORIDE: 600; 310; 30; 20 INJECTION, SOLUTION INTRAVENOUS at 08:54

## 2022-05-23 RX ADMIN — BUPIVACAINE HYDROCHLORIDE 5 ML: 2.5 INJECTION, SOLUTION EPIDURAL; INFILTRATION; INTRACAUDAL; PERINEURAL at 11:06

## 2022-05-23 RX ADMIN — PROPOFOL 150 MG: 10 INJECTION, EMULSION INTRAVENOUS at 07:46

## 2022-05-23 RX ADMIN — CEFAZOLIN SODIUM 2000 MG: 1 INJECTION, POWDER, FOR SOLUTION INTRAMUSCULAR; INTRAVENOUS at 08:04

## 2022-05-23 RX ADMIN — PROPOFOL 50 MG: 10 INJECTION, EMULSION INTRAVENOUS at 07:52

## 2022-05-23 RX ADMIN — IPRATROPIUM BROMIDE AND ALBUTEROL SULFATE 1 AMPULE: 2.5; .5 SOLUTION RESPIRATORY (INHALATION) at 15:00

## 2022-05-23 RX ADMIN — ROCURONIUM BROMIDE 70 MG: 10 INJECTION, SOLUTION INTRAVENOUS at 07:46

## 2022-05-23 RX ADMIN — SUFENTANIL CITRATE 10 MCG: 50 INJECTION, SOLUTION EPIDURAL; INTRAVENOUS at 10:33

## 2022-05-23 RX ADMIN — Medication 5 MCG/MIN: at 18:00

## 2022-05-23 RX ADMIN — LIDOCAINE HYDROCHLORIDE 50 MG: 10 INJECTION, SOLUTION EPIDURAL; INFILTRATION; INTRACAUDAL; PERINEURAL at 07:46

## 2022-05-23 RX ADMIN — SODIUM CHLORIDE, SODIUM LACTATE, POTASSIUM CHLORIDE, AND CALCIUM CHLORIDE: 600; 310; 30; 20 INJECTION, SOLUTION INTRAVENOUS at 06:36

## 2022-05-23 RX ADMIN — SUFENTANIL CITRATE 20 MCG: 50 INJECTION EPIDURAL; INTRAVENOUS at 07:42

## 2022-05-23 RX ADMIN — PHENYLEPHRINE HYDROCHLORIDE 80 MCG: 10 INJECTION INTRAVENOUS at 08:06

## 2022-05-23 RX ADMIN — SODIUM CHLORIDE: 4.5 INJECTION, SOLUTION INTRAVENOUS at 14:00

## 2022-05-23 RX ADMIN — SUFENTANIL CITRATE 10 MCG: 50 INJECTION, SOLUTION EPIDURAL; INTRAVENOUS at 07:44

## 2022-05-23 RX ADMIN — PHENYLEPHRINE HYDROCHLORIDE 80 MCG: 10 INJECTION INTRAVENOUS at 08:37

## 2022-05-23 RX ADMIN — CLONAZEPAM 1 MG: 1 TABLET ORAL at 21:13

## 2022-05-23 RX ADMIN — PHENYLEPHRINE HYDROCHLORIDE 100 MCG/MIN: 10 INJECTION INTRAVENOUS at 08:55

## 2022-05-23 ASSESSMENT — PAIN SCALES - GENERAL
PAINLEVEL_OUTOF10: 0
PAINLEVEL_OUTOF10: 0
PAINLEVEL_OUTOF10: 3
PAINLEVEL_OUTOF10: 1
PAINLEVEL_OUTOF10: 0

## 2022-05-23 ASSESSMENT — ENCOUNTER SYMPTOMS
COUGH: 0
SHORTNESS OF BREATH: 0
CONSTIPATION: 0
ABDOMINAL PAIN: 0
VOMITING: 0
CHEST TIGHTNESS: 0
SHORTNESS OF BREATH: 0
DIARRHEA: 0
NAUSEA: 0
BACK PAIN: 0

## 2022-05-23 ASSESSMENT — PAIN DESCRIPTION - DESCRIPTORS
DESCRIPTORS: TENDER
DESCRIPTORS: TENDER

## 2022-05-23 ASSESSMENT — PAIN DESCRIPTION - ORIENTATION
ORIENTATION: RIGHT
ORIENTATION: RIGHT

## 2022-05-23 ASSESSMENT — PAIN DESCRIPTION - LOCATION
LOCATION: RIB CAGE
LOCATION: RIB CAGE

## 2022-05-23 ASSESSMENT — LIFESTYLE VARIABLES: SMOKING_STATUS: 0

## 2022-05-23 ASSESSMENT — PAIN - FUNCTIONAL ASSESSMENT
PAIN_FUNCTIONAL_ASSESSMENT: ACTIVITIES ARE NOT PREVENTED
PAIN_FUNCTIONAL_ASSESSMENT: ACTIVITIES ARE NOT PREVENTED

## 2022-05-23 NOTE — ANESTHESIA POSTPROCEDURE EVALUATION
Department of Anesthesiology  Postprocedure Note    Patient: Tati Odonnell  MRN: [id number]  [place]: 1955  Date of evaluation: 5/23/2022  Time:  11:59 AM     Procedure Summary     Date: 05/23/22 Room / Location: [address]    Anesthesia Start: 8339 Anesthesia Stop: 0175    Procedure: ROBOTIC RIGHT UPPER LOBECTOMY (Right Chest) Diagnosis:       Malignant neoplasm of upper lobe of right lung ([facility] [address]      (C34.11)    Surgeons: Nilton Carter MD Responsible Provider: CAPO Willard CRNA    Anesthesia Type: general ASA Status: 4          Anesthesia Type: No value filed. Reilly Phase I: Reilly Score: 9    Reilly Phase II:      Last vitals: Reviewed and per EMR flowsheets.        Anesthesia Post Evaluation    Patient location during evaluation: PACU  Patient participation: complete - patient participated  Level of consciousness: sleepy but conscious  Pain score: 0  Airway patency: patent  Nausea & Vomiting: no nausea and no vomiting  Complications: no  Cardiovascular status: hemodynamically stable  Respiratory status: acceptable  Hydration status: stable

## 2022-05-23 NOTE — H&P
Roger Novoa is an 77 y.o.  male. He presents with a history of RUL nodule, PET+, cancer dx on biopsy. He has a past h/o smoking and was noted to have fluid build up after CABG and underwent chest CT scan. This revealed a RUL nodule 2cm diameter. PET scan was positive with no regional or systemic spread noted. RUL biopsy was positive for squamous cell cancer. CT surgery consulted for consideration of robotic RULobectomy. He is scheduled today for this procedure. Past Medical History:   Diagnosis Date    Arthritis     hands    CAD (coronary artery disease)     Cancer [facility] [address]     Septum     CHF (congestive heart failure) ([facility] [address]     se[facility] dr. Beatriz Yo COPD [facility]ch[facility] obstructive [facility]     Headache     Hypertension     Lung nodule 12/2021    right upper lobe    [facility]      Exam: unchanged from exam performed last week in clinic. ROS: unchanged    Allergies: No Known Allergies    Principal Problem:    Lung mass  Resolved Problems:    * No resolved hospital problems. *    Blood pressure 124/82, pulse 85, temperature 96.6 °F (35.9 °C), temperature source Temporal, resp. rate 16, height 5' 10\" (1.778 m), weight 177 lb (80.3 kg), SpO2 100 %. Review of Systems   Constitutional: Negative for activity change, appetite change, chills, diaphoresis, fatigue, fever and unexpected weight change. HENT: Negative for dental problem. Eyes: Negative for visual disturbance. Respiratory: Negative for cough, chest tightness and shortness of breath. No hoarseness. No hemoptysis. Cardiovascular: Negative for chest pain, palpitations and leg swelling. No orthopnea or PND. Gastrointestinal: Negative for abdominal pain, constipation, diarrhea, nausea and vomiting. Genitourinary: Negative for dysuria, frequency, hematuria and urgency. Musculoskeletal: Negative for back pain and joint swelling. Skin: Negative.     Neurological: Negative for dizziness, seizures, syncope, light-headedness and headaches. Psychiatric/Behavioral: Negative for confusion and hallucinations. The patient is not nervous/anxious. Assessment:  RUL cancer, indicated for RULobectomy. Moderate COPD that should not cause significant problems after lobectomy. Plan:  Robotic, possible open, lobectomy.     Arabella Mi MD  5/23/2022

## 2022-05-23 NOTE — PROGRESS NOTES
Pt received to ICU from PACU post  Op robotic right thoracotomy. Placed on ICU monitors. Right chest tube to 20cm dry suction with small air leak. 4 right small chest incisions with glue closure without drainage. Epidural on at 5cc. Epidural site clear. Daugherty to BSD diuresing clear yellow urine. Left radial arterial line with dampened waveform and positional. Pt is alert and pleasant and says his pain level is 0. Pts daughter came in to visit and brought pt his cell phone.

## 2022-05-23 NOTE — ANESTHESIA PROCEDURE NOTES
Arterial Line:    An arterial line was placed using ultrasound guidance and surface landmarks, in the pre-op for the following indication(s): continuous blood pressure monitoring and blood sampling needed. A 22 gauge (size), 1 and 3/4 inch (length), Arrow (type) catheter was placed, Seldinger technique used, into the left radial artery and a [employer] Arterial Cannulation Support device was used for positioning, secured by tape and Tegaderm. Anesthesia type: Local    Events:  patient tolerated procedure well with no complications. 5/23/2022 8:54 AM5/23/2022 8:54 AM  Anesthesiologist: Karine Zhang MD  Performed: Anesthesiologist   Preanesthetic Checklist  Completed: patient identified, IV checked, site marked, risks and benefits discussed, surgical consent, monitors and equipment checked, pre-op evaluation, timeout performed, anesthesia consent given, oxygen available and patient being monitored

## 2022-05-23 NOTE — BRIEF OP NOTE
Brief Postoperative Note      Patient: Uma Arredondo  YOB: 1955  MRN: [id number]    Date of Procedure: 5/23/2022    Pre-Op Diagnosis: C34.11    Post-Op Diagnosis: right upper lobe lung cancer       Procedure(s):  ROBOTIC RIGHT UPPER LOBECTOMY    Surgeon(s):  Kyra Sigala MD    Assistant:  First Assistant: Harshil Gama RN    Anesthesia: General    Estimated Blood Loss (mL): Minimal    Complications: None    Specimens:   ID Type Source Tests Collected by Time Destination   A : RIGHT SUBCARINAL LYMPH NODE STATION 7 Tissue Lymph Node SURGICAL PATHOLOGY Kyra Sigala MD 5/23/2022 0902    B : STATION 10R LYMPH NODE Tissue Lymph Node SURGICAL PATHOLOGY Kyra Sigala MD 5/23/2022 0910    C : [address] Kyra Sigala MD 5/23/2022 0913    D : STATION 11R SUMP Tissue Lymph Node SURGICAL PATHOLOGY Kyra Sigala MD 5/23/2022 0935    E : LUNG, RIGHT UPPER LOBE Tissue Lung SURGICAL PATHOLOGY Kyra Sigala MD 5/23/2022 1040    F : STATION 2R LYMPH NODE Tissue Lymph Node SURGICAL PATHOLOGY Kyra Sigala MD 5/23/2022 1047        Implants:  * No implants in log *      Drains:   Urinary Catheter 2 Way; Daugherty (Active)       Findings: adhesions of the RUL to the mediastinum due to prior CABG - easily lysed. Mass palpated in the RUL specimen that was removed.   Regional LN's grossly normal.    Electronically signed by Kyra Sigala MD on 5/23/2022 at 10:58 AM

## 2022-05-23 NOTE — ANESTHESIA PRE PROCEDURE
Department of Anesthesiology  Preprocedure Note       Name:  Zaida Turner   Age:  77 y.o.  :  1955                                          MRN:  [id number]         Date:  2022      Surgeon: Darlyn Mendoza):  Luly Andrade MD    Procedure: Procedure(s):  ROBOTIC RIGHT UPPER LOBECTOMY    Medications prior to admission:   Prior to Admission medications    Medication Sig Start Date End Date Taking? Authorizing Provider   spironolactone (ALDACTONE) 25 MG tablet Take 25 mg by mouth daily    Historical Provider, MD   sacubitril-valsartan (ENTRESTO) 24-26 MG per tablet Take 1 tablet by mouth 2 times daily Pt did not have bottle to verify dose    Historical Provider, MD   magnesium gluconate (MAGONATE) 500 MG tablet Take 250 mg by mouth daily    Historical Provider, MD   melatonin 3 MG TABS tablet Take 3 mg by mouth nightly as needed    Historical Provider, MD   butalbital-acetaminophen-caffeine (FIORICET, ESGIC) -40 MG per tablet Take 1 tablet by mouth every 6 hours as needed for Headaches 3/24/22   Garlandt Vietes CAPO Wood   clonazePAM (KLONOPIN) 1 MG tablet Take 1 tablet by mouth 3 times daily as needed for Anxiety for up to 30 days. 3/24/22 4/23/22  Earsuzannet CAPO Uribe   esomeprazole (NEXIUM) 40 MG delayed release capsule Take 1 capsule by mouth daily 21   CAPO Paul   clopidogrel (PLAVIX) 75 MG tablet Take 75 mg by mouth daily    Historical Provider, MD   furosemide (LASIX) 40 MG tablet Take 40 mg by mouth daily    Historical Provider, MD   aspirin 81 MG EC tablet Take 81 mg by mouth daily    Historical Provider, MD   Metoprolol Succinate 25 MG CS24 Take 12.5 mg by mouth daily Pt is taking 1/2 pill only    Historical Provider, MD   Misc.  Devices (ROLLATOR ULTRA-LIGHT) MISC Use to walk  Patient not taking: Reported on 2022 10/29/21   CAPO Paul       Current medications:    Current Facility-Administered Medications   Medication Dose Route Frequency Provider Last Rate Last Admin  lactated ringers infusion   IntraVENous Continuous Jon Ricks  mL/hr at 22 0636 New Bag at 22 0636     Facility-Administered Medications Ordered in Other Encounters   Medication Dose Route Frequency Provider Last Rate Last Admin    mupirocin (BACTROBAN) 2 % ointment   Topical Daily Stefano Jarrett MD   Given at 22 0919       Allergies:  No Known Allergies    Problem List:    Patient Active Problem List   Diagnosis Code    Primary insomnia F51.01    ABHINAV (generalized anxiety disorder) F41.1    Essential hypertension I10    Chronic tension-type headache, not intractable G44.229    Acute ST elevation myocardial infarction (STEMI) of anteroseptal wall (HCC) I21.09    Heart attack ([facility] I21.9    Lung nodule R91.1    Smoking history Z87.891    Hx of CABG Z95.1    History of MI (myocardial infarction) I25.2    Squamous cell carcinoma of right lung ([address] C34.91       Past Medical History:        Diagnosis Date    Arthritis     hands    CAD (coronary artery disease)     Cancer ([facility]     Septum     CHF (congestive heart failure) ([facility]     sees dr. Sharifa Donald COPD (chronic obstructive pulmonary disease) ([facility]     Headache     Hypertension     Lung nodule 2021    right upper lobe    MI ([facility] infarction) [facility])        Past Surgical History:        Procedure Laterality Date    CARDIAC SURGERY      CORONARY ARTERY BYPASS GRAFT  10/2021    CT NEEDLE BIOPSY LUNG PERCUTANEOUS  2022    CT NEEDLE BIOPSY LUNG PERCUTANEOUS 2022 [facility] CT SCAN    NOSE SURGERY  2016    SKIN BIOPSY      nose       Social History:    Social History     Tobacco Use    Smoking status: Former Smoker     Packs/day: 2.00     Years: 51.00     Pack years: 102.00     Types: Cigarettes     Start date: 1970     Quit date: 10/27/2021     Years since quittin.5    Smokeless tobacco: Former User   Substance Use Topics    Alcohol use:  No                                Counseling given: Not Answered      Vital Signs (Current):   Vitals:    05/23/22 0625   BP: 124/82   Pulse: 85   Resp: 16   Temp: 96.6 °F (35.9 °C)   TempSrc: Temporal   SpO2: 100%   Weight: 177 lb (80.3 kg)   Height: 5' 10\" (1.778 m)                                              BP Readings from Last 3 Encounters:   05/23/22 124/82   05/11/22 113/76   05/03/22 110/70       NPO Status: Time of last liquid consumption: 2100                        Time of last solid consumption: 2100                        Date of last liquid consumption: 05/22/22                        Date of last solid food consumption: 05/22/22    BMI:   Wt Readings from Last 3 Encounters:   05/23/22 177 lb (80.3 kg)   05/19/22 177 lb (80.3 kg)   05/11/22 176 lb 3.2 oz (79.9 kg)     Body mass index is 25.4 kg/m². CBC:   Lab Results   Component Value Date    WBC 7.1 05/19/2022    RBC 4.60 05/19/2022    HGB 13.6 05/19/2022    HCT 43.2 05/19/2022    MCV 93.9 05/19/2022    RDW 15.7 05/19/2022     05/19/2022       CMP:   Lab Results   Component Value Date     05/19/2022    K 4.1 05/19/2022    CL 99 05/19/2022    CO2 26 05/19/2022    BUN 14 05/19/2022    CREATININE 1.1 05/19/2022    GFRAA >59 05/19/2022    LABGLOM >60 05/19/2022    GLUCOSE 111 05/19/2022    PROT 7.4 05/19/2022    CALCIUM 9.8 05/19/2022    BILITOT 0.4 05/19/2022    ALKPHOS 71 05/19/2022    AST 18 05/19/2022    ALT 15 05/19/2022       POC Tests: No results for input(s): POCGLU, POCNA, POCK, POCCL, POCBUN, POCHEMO, POCHCT in the last 72 hours.     Coags:   Lab Results   Component Value Date    PROTIME 13.6 05/19/2022    INR 1.05 05/19/2022    APTT 27.0 04/29/2022       HCG (If Applicable): No results found for: PREGTESTUR, PREGSERUM, HCG, HCGQUANT     ABGs:   Lab Results   Component Value Date    PHART 7.390 10/21/2021    PO2ART 67 10/21/2021    BOO5XVQ 41 10/21/2021    BEART 0 10/21/2021    H0OVJHPQ 93 10/21/2021        Type & Screen (If Applicable):  No results found for: LABABO, [address]    Drug/Infectious Status (If Applicable):  No results found for: HIV, HEPCAB    COVID-19 Screening (If Applicable):   Lab Results   Component Value Date    COVID19 Not Detected 04/22/2022           Anesthesia Evaluation  Patient summary reviewed and Nursing notes reviewed no history of anesthetic complications:   Airway: Mallampati: II  TM distance: >3 FB   Neck ROM: full  Mouth opening: > = 3 FB   Dental: normal exam         Pulmonary:Negative Pulmonary ROS and normal exam  breath sounds clear to auscultation  (+) COPD:      (-) shortness of breath and not a current smoker          Patient did not smoke on day of surgery. Cardiovascular:    (+) hypertension:, past MI:, CAD:, CABG/stent:, CHF:,     (-)  angina    NYHA Classification: I  ECG reviewed  Rhythm: regular  Rate: normal           Beta Blocker:  Not on Beta Blocker      ROS comment: 5v cabg 2021 in [facility]      Neuro/Psych:   Negative Neuro/Psych ROS  (+) headaches:, psychiatric history:   (-) seizures, CVA and depression/anxiety            GI/Hepatic/Renal: Neg GI/Hepatic/Renal ROS       (-) hiatal hernia and GERD       Endo/Other: Negative Endo/Other ROS   (+) blood dyscrasia: anticoagulation therapy:., .          Pt had PAT visit. ROS comment: Off plavix for 8 days  Abdominal:       Abdomen: soft. Vascular: Other Findings:           Anesthesia Plan      general     ASA 4     (Iv zofran within 30 min of closing )  Induction: intravenous. arterial line  MIPS: Postoperative opioids intended, Prophylactic antiemetics administered and One-lung ventilation. Anesthetic plan and risks discussed with patient. Use of blood products discussed with patient whom. Plan discussed with CRNA.     Attending anesthesiologist reviewed and agrees with Pre Eval content              Neeru Medellin MD   5/23/2022

## 2022-05-24 ENCOUNTER — APPOINTMENT (OUTPATIENT)
Dept: GENERAL RADIOLOGY | Age: 67
DRG: 164 | End: 2022-05-24
Attending: SURGERY
Payer: MEDICARE

## 2022-05-24 LAB
ALBUMIN SERPL-MCNC: 3.4 G/DL (ref 3.5–5.2)
ALP BLD-CCNC: 51 U/L (ref 40–130)
ALT SERPL-CCNC: 16 U/L (ref 5–41)
ANION GAP SERPL CALCULATED.3IONS-SCNC: 11 MMOL/L (ref 7–19)
AST SERPL-CCNC: 38 U/L (ref 5–40)
BASOPHILS ABSOLUTE: 0.2 K/UL (ref 0–0.2)
BASOPHILS RELATIVE PERCENT: 1.4 % (ref 0–1)
BILIRUB SERPL-MCNC: 0.6 MG/DL (ref 0.2–1.2)
BUN BLDV-MCNC: 16 MG/DL (ref 8–23)
CALCIUM SERPL-MCNC: 8.1 MG/DL (ref 8.8–10.2)
CHLORIDE BLD-SCNC: 96 MMOL/L (ref 98–111)
CO2: 25 MMOL/L (ref 22–29)
CREAT SERPL-MCNC: 1.3 MG/DL (ref 0.5–1.2)
EKG P AXIS: NORMAL DEGREES
EKG P-R INTERVAL: NORMAL MS
EKG Q-T INTERVAL: 380 MS
EKG QRS DURATION: 94 MS
EKG QTC CALCULATION (BAZETT): 416 MS
EKG T AXIS: 109 DEGREES
EOSINOPHILS ABSOLUTE: 0.2 K/UL (ref 0–0.6)
EOSINOPHILS RELATIVE PERCENT: 1.5 % (ref 0–5)
GFR AFRICAN AMERICAN: >59
GFR NON-AFRICAN AMERICAN: 55
GLUCOSE BLD-MCNC: 102 MG/DL (ref 74–109)
HCT VFR BLD CALC: 37.2 % (ref 42–52)
HEMOGLOBIN: 12.1 G/DL (ref 14–18)
IMMATURE GRANULOCYTES #: 0 K/UL
LYMPHOCYTES ABSOLUTE: 2.6 K/UL (ref 1.1–4.5)
LYMPHOCYTES RELATIVE PERCENT: 24.7 % (ref 20–40)
MCH RBC QN AUTO: 30 PG (ref 27–31)
MCHC RBC AUTO-ENTMCNC: 32.5 G/DL (ref 33–37)
MCV RBC AUTO: 92.1 FL (ref 80–94)
MONOCYTES ABSOLUTE: 0.8 K/UL (ref 0–0.9)
MONOCYTES RELATIVE PERCENT: 7.8 % (ref 0–10)
NEUTROPHILS ABSOLUTE: 6.7 K/UL (ref 1.5–7.5)
NEUTROPHILS RELATIVE PERCENT: 64.2 % (ref 50–65)
PDW BLD-RTO: 15.4 % (ref 11.5–14.5)
PLATELET # BLD: 153 K/UL (ref 130–400)
PMV BLD AUTO: 10.8 FL (ref 9.4–12.4)
POTASSIUM REFLEX MAGNESIUM: 4 MMOL/L (ref 3.5–5)
RBC # BLD: 4.04 M/UL (ref 4.7–6.1)
SODIUM BLD-SCNC: 132 MMOL/L (ref 136–145)
TOTAL PROTEIN: 5.4 G/DL (ref 6.6–8.7)
WBC # BLD: 10.5 K/UL (ref 4.8–10.8)

## 2022-05-24 PROCEDURE — 99024 POSTOP FOLLOW-UP VISIT: CPT | Performed by: SURGERY

## 2022-05-24 PROCEDURE — 93010 ELECTROCARDIOGRAM REPORT: CPT | Performed by: INTERNAL MEDICINE

## 2022-05-24 PROCEDURE — 6370000000 HC RX 637 (ALT 250 FOR IP): Performed by: NURSE PRACTITIONER

## 2022-05-24 PROCEDURE — 85025 COMPLETE CBC W/AUTO DIFF WBC: CPT

## 2022-05-24 PROCEDURE — 6370000000 HC RX 637 (ALT 250 FOR IP): Performed by: SURGERY

## 2022-05-24 PROCEDURE — 2580000003 HC RX 258: Performed by: SURGERY

## 2022-05-24 PROCEDURE — 36415 COLL VENOUS BLD VENIPUNCTURE: CPT

## 2022-05-24 PROCEDURE — 2140000000 HC CCU INTERMEDIATE R&B

## 2022-05-24 PROCEDURE — 80053 COMPREHEN METABOLIC PANEL: CPT

## 2022-05-24 PROCEDURE — 71045 X-RAY EXAM CHEST 1 VIEW: CPT

## 2022-05-24 PROCEDURE — 94640 AIRWAY INHALATION TREATMENT: CPT

## 2022-05-24 RX ORDER — NOREPINEPHRINE BIT/0.9 % NACL 16MG/250ML
1-100 INFUSION BOTTLE (ML) INTRAVENOUS CONTINUOUS
Status: DISCONTINUED | OUTPATIENT
Start: 2022-05-24 | End: 2022-05-24

## 2022-05-24 RX ADMIN — SODIUM CHLORIDE: 4.5 INJECTION, SOLUTION INTRAVENOUS at 04:58

## 2022-05-24 RX ADMIN — IPRATROPIUM BROMIDE AND ALBUTEROL SULFATE 1 AMPULE: 2.5; .5 SOLUTION RESPIRATORY (INHALATION) at 06:07

## 2022-05-24 RX ADMIN — IPRATROPIUM BROMIDE AND ALBUTEROL SULFATE 1 AMPULE: 2.5; .5 SOLUTION RESPIRATORY (INHALATION) at 10:45

## 2022-05-24 RX ADMIN — PANTOPRAZOLE SODIUM 40 MG: 40 TABLET, DELAYED RELEASE ORAL at 09:38

## 2022-05-24 RX ADMIN — OXYCODONE 10 MG: 5 TABLET ORAL at 18:57

## 2022-05-24 RX ADMIN — IPRATROPIUM BROMIDE AND ALBUTEROL SULFATE 1 AMPULE: 2.5; .5 SOLUTION RESPIRATORY (INHALATION) at 14:47

## 2022-05-24 RX ADMIN — IPRATROPIUM BROMIDE AND ALBUTEROL SULFATE 1 AMPULE: 2.5; .5 SOLUTION RESPIRATORY (INHALATION) at 18:39

## 2022-05-24 RX ADMIN — CLONAZEPAM 1 MG: 1 TABLET ORAL at 22:59

## 2022-05-24 RX ADMIN — SODIUM CHLORIDE, PRESERVATIVE FREE 10 ML: 5 INJECTION INTRAVENOUS at 20:05

## 2022-05-24 RX ADMIN — SODIUM CHLORIDE, PRESERVATIVE FREE 10 ML: 5 INJECTION INTRAVENOUS at 09:39

## 2022-05-24 ASSESSMENT — PAIN DESCRIPTION - ONSET
ONSET: OTHER (COMMENT)
ONSET: PROGRESSIVE

## 2022-05-24 ASSESSMENT — PAIN DESCRIPTION - FREQUENCY
FREQUENCY: INTERMITTENT
FREQUENCY: CONTINUOUS

## 2022-05-24 ASSESSMENT — PAIN SCALES - GENERAL
PAINLEVEL_OUTOF10: 2
PAINLEVEL_OUTOF10: 0
PAINLEVEL_OUTOF10: 5
PAINLEVEL_OUTOF10: 0

## 2022-05-24 ASSESSMENT — PAIN - FUNCTIONAL ASSESSMENT
PAIN_FUNCTIONAL_ASSESSMENT: ACTIVITIES ARE NOT PREVENTED
PAIN_FUNCTIONAL_ASSESSMENT: PREVENTS OR INTERFERES SOME ACTIVE ACTIVITIES AND ADLS

## 2022-05-24 ASSESSMENT — PAIN DESCRIPTION - ORIENTATION
ORIENTATION: RIGHT
ORIENTATION: RIGHT

## 2022-05-24 ASSESSMENT — PAIN DESCRIPTION - DIRECTION: RADIATING_TOWARDS: RIGHT SIDE

## 2022-05-24 ASSESSMENT — PAIN DESCRIPTION - LOCATION
LOCATION: RIB CAGE
LOCATION: RIB CAGE

## 2022-05-24 ASSESSMENT — PAIN DESCRIPTION - DESCRIPTORS
DESCRIPTORS: ACHING;BURNING
DESCRIPTORS: TENDER

## 2022-05-24 ASSESSMENT — PAIN DESCRIPTION - PAIN TYPE
TYPE: SURGICAL PAIN
TYPE: ACUTE PAIN;SURGICAL PAIN

## 2022-05-24 NOTE — PROGRESS NOTES
Progress Note    2022 7:47 AM          POD#   1    Subjective:  Mr. MEET MENDEZ has had excellent pain control while on the epidural. No additional pain medications have been required since the OR. PULSE OXIMETRY RANGE: SpO2  Av.5 %  Min: 89 %  Max: 100 %  SUPPLEMENTAL O2: O2 Flow Rate (L/min): 6 L/min   Vital Signs: BP (!) 95/53   Pulse 87   Temp 98 °F (36.7 °C) (Temporal)   Resp 18   Ht 5' 10\" (1.778 m)   Wt 177 lb (80.3 kg)   SpO2 94%   BMI 25.40 kg/m²    Temperature Range:   Temp: 98 °F (36.7 °C)   Temp  Av.2 °F (36.8 °C)  Min: 97 °F (36.1 °C)  Max: 99.7 °F (37.6 °C)       Rhythm: normal sinus rhythm (episode of afib)    Labs:   ABG:    Lab Results   Component Value Date    PHART 7.390 10/21/2021    PO2ART 67 10/21/2021    UXV1ZRL 41 10/21/2021    Z9HNFZBS 93 10/21/2021    SZD9OCN 26 10/21/2021    BEART 0 10/21/2021     CBC:   Recent Labs     22  0424   WBC 10.5   HGB 12.1*   HCT 37.2*   MCV 92.1        BMP:   Recent Labs     22  0424   *   K 4.0   CL 96*   CO2 25   BUN 16   CREATININE 1.3*     PT/INR: No results for input(s): PROTIME, INR in the last 72 hours. APTT: No results for input(s): APTT in the last 72 hours. Chest X-Ray:  Left lung well expaded   CT:  I/O last 3 completed shifts: In: 5429.4 [P.O.:1500; I.V.:3929.4]  Out: 6513 [Urine:3135; Blood:20; Chest Tube:580]    serosanguinous  Air Leak:  small air leak  I/O last 3 completed shifts: In: 5429.4 [P.O.:1500;  I.V.:3929.4]  Out: 3437 [Urine:3135; Blood:20; Chest Tube:580]  Scheduled Meds:    pantoprazole  40 mg Oral QAM AC    sodium chloride flush  5-40 mL IntraVENous 2 times per day    [Held by provider] enoxaparin  40 mg SubCUTAneous Daily    ipratropium-albuterol  1 ampule Inhalation Q4H WA     Continuous Infusions:    fentaNYL with bupivacaine epidural 2 mL/hr at 22 1745    sodium chloride 75 mL/hr at 22 0458    sodium chloride      norepinephrine 7 mcg/min (22 0236) Exam:   General appearance: NAD  Neck: no bruits, no JVD, No lymph nodes   Lungs: no rhonchi, no wheezes, no rales   Port site incisions: stable, dressing dry and intact    Heart: S1 and S2 no murmer, + rub  Abdomen: positive bowel sounds, no bruits, no masses  Extremities: warm and dry, no cyanosis, no clubbing    Assessment  1. SP robotic RULobectomy. Doing well. 2. Minimal bleeding despite redo thoracic case (prior CABG). Chest tube output is non bloody. hct 37 this morning. No undrained fluid on the CXR. 3. Air leak due to staple lines on the lung from incomplete fissure. No ptx. Will continue tubes until air leak stops. 4. Pain control excellent but epidural causing some hypotension. Will discuss removing the marcaine from the epidural solution and using only morphine. He is currently needing levo at 7 to treat low SBP. 5. Respiratory function is excellent. O2 sats in the 90's on room air.       Patient Active Problem List   Diagnosis    Primary insomnia    ABHINAV (generalized anxiety disorder)    Essential hypertension    Chronic tension-type headache, not intractable    Acute ST elevation myocardial infarction (STEMI) of anteroseptal wall (HCC)    Heart attack (HCC)    Lung nodule    Smoking history    Hx of CABG    History of MI (myocardial infarction)    Squamous cell carcinoma of right lung (HCC)    Lung mass       MD Teri Akins MD

## 2022-05-24 NOTE — PROGRESS NOTES
Ashley Rojas transferred to  from [facility] via wheelchair. Reason for transfer: lower level of care     Explained reason for transfer to Patient. Belongings:  with patient at bedside . Soft chart transferred with patient: Yes. Telemetry box number 039  transferred with patient: yes. Report given to: Trip Lewis RN at bedside.       Electronically signed by Rajani Cook RN on 5/24/2022 at 5:56 PM

## 2022-05-24 NOTE — PROGRESS NOTES
Pt made three laps (450 ft) around the ICU at a brisk pace on tele and with RN. Pt ambulated with ease and states he feels much better after getting up.

## 2022-05-24 NOTE — OP NOTE
JOSÉ MIGUEL [facility] [address] [facility]                                OPERATIVE REPORT    PATIENT NAME: Sherri Guzman                      :        1955  MED REC NO:   [id number]                              ROOM:       [facility]  ACCOUNT NO:   [de-identified]                           ADMIT DATE: 2022  PROVIDER:     Michael Galeas MD    DATE OF PROCEDURE:  2022    PREOPERATIVE DIAGNOSIS:  Right upper lobe cancer. POSTOPERATIVE DIAGNOSIS:  Right upper lobe cancer. PROCEDURE:  Minimally invasive, robotic-assisted, right upper lobectomy and regional lymph node dissection. ATTENDING:  Michael Galeas MD    ASSISTANT:  Raj Gutiérrez. EBL: minimal    HISTORY:  The patient is a 14-year-old gentleman with a past history of  smoking who was found to have a lesion in his right upper lobe. This  was found to be non-small cell lung cancer and he was therefore referred  for thoracic surgery. He had relatively normal pulmonary function and  his heart was in good condition after his bypass surgery and therefore,  he was deemed a good candidate for right upper lobectomy. The patient  understood the risks and benefits of the procedure and agreed to  proceed. DESCRIPTION OF PROCEDURE:  The patient underwent general endotracheal  anesthesia which he tolerated without any hemodynamic difficulty. A  double-lumen endotracheal tube was placed. The patient tolerated  deflation of the right lung without any oxygenation difficulties. He  maintained good O2 saturations throughout. Time-out was performed. Then, we initiated the operation by placing ports along the eight  intercostal space starting near the cardiac border and then every four  fingerbreadths, additional ports were made for a total of four robotic  ports. A separate non-robotic working port for the assistant was used  down in the twelfth intercostal space in the anterior axillary line. fire was then placed with a green load on the robotic  stapler which is the thickest available. I was able to fire the staples  across the bronchus successfully. There was a minimal stump noted  coming off of the main right stem bronchus to avoid the risk of  inspissation of secretion from this area. The minor and major fissure  were then created using the thoracoscopic stapler because this was more  appropriately suited than the robotic stapler for thicker tissue such as  that with the lung fissures. After these fissures were created, the  posterior ascending branch was identified coming off near the bronchial  stump and this was clipped using a large white clip. The specimen then  was able to be removed and placed into a tumor assessment bag. This was  brought out through the assistant port in the twelfth intercostal space. It required enlarging it somewhat, but it was able to come out with a  reasonably small incision. This site was closed with a Vicryl deep  layer and a Monocryl for the skin. Once the specimen was out, the  surgeon went to the operative field and palpated the right upper lobe  and approximately 3 cm mass was noted in a location consistent with CT  scan findings. The irrigation was used for the stump of the vessels and  there was no bleeding noted. The amount of air leak was fairly minimal  at this point. A chest tube was placed. The patient then had the other  port site wounds closed and then woke up from anesthesia and returned to  the ICU in stable condition. I was attending for this case. I was  present for the entire procedure. Toi Dolan MD    D: 05/23/2022 12:18:59      T: 05/23/2022 15:01:04     [email or web address]  Job#: [id number]     Doc#: [id number]    CC:   Jackie Yo

## 2022-05-24 NOTE — CARE COORDINATION
Date / Time of Evaluation:   5/24/2022    4:22 PM  Assessment Completed by:   ROSITA David      Patient Name:   Kasey Lowe  MRN:   [id number]  [place]:   1955    Patient Admission Status:   inpt    Patient Contact Information:    [address] [address]  [phone] (home)   Telephone Information:   Mobile [phone]     Above information verified? [x]   Yes  []   No    (Best Practice:   Have patient/caregiver verify above address and phone number by stating out loud their current address and reachable phone number. Initial Assessment Completed at bedside with:   Grandson at bedside    [x]   Patient  [x]   Family/Caregiver/Guardian   []   Other:      Current PCP:    CAPO Morales    PCP verified? [x]   Yes  []   No    Emergency Contacts:    Extended Emergency Contact Information  Primary Emergency Contact: Niharika Lamb  Address: [address] [place] [address] Phone: [phone]  Mobile Phone: [phone]  Relation: Child  Secondary Emergency Contact: [address] [place] [address] Phone: [phone]  Relation: Grandchild  Preferred language: English   needed? No    Advance Directives:    Does Mr. Dena Gilmore have an advance directive in his electronic medical record? [x]   Yes  []   No    Code Status:   Full Code      Have you been vaccinated for COVID-19 (SARS-CoV-2)? [x]   Yes  []   No                   If so, when?     Which :         []   Pfizer-[employer]  [x]   Moderna  []   [employer]  []   Other:       Do you have any of the following unmet social needs that would keep you from returning home safely:    []   Yes  [x]   No                    Unmet Social Needs:           []   Living Situation/Housing  []   Food  []   Stroke Education   []   Utilities  []   Personal Safety  []   Financial Strain  []   Employment  []   Mental Health  []   Substance Abuse  []   Transportation Barriers    Additional Unmet Social Needs Notes:           Financial:    Payor: Sonja Ritchie / Plan: Sonja Ritchie / Product Type: *No Product type* /     Pre-Cert required for SNF:     [x]   Yes  []   No    Have Long Term Care Insurance:      []   Yes  [x]   No      Pharmacy:    [address], [address] [phone] Jyotsna Platwillian [phone]  [address] [facility] [address]  Phone: [phone] Fax: [address] 0533, [address] [facility] [phone] Jyotsna Platts [phone]  [address] [address]  Phone: [phone] Fax: [phone]    Potential assistance purchasing medications? Discussed with Pt; he stated he gets his medications at [facility] and can afford and manage them all.      []   Yes  [x]   No      ADLS:  IND with ADL/IADL needs prior to admission     Support System:    family      Current Home Environment:   Lives with his daughterChacorta Given     Steps:       []   Yes  []   No    If yes, how many?     Plans to RETURN to current housing:     [x]   Yes  []   No    Barriers to RETURNING to current housing:  Medical stability    Currently ACTIVE with Home Health CARE:  Agreeable to having ordered at d/c    []   Yes  [x]   No    Home Health Care Agency:        DME Provider:   No pref; has a walker      Had [facility] prior to admission:      []   Yes  [x]   No        Active with HD/PD prior to admission:             []   Yes  [x]   No    Nephrologist:     HD Center:         Transition Plan:   pt is hopeful to return back to his home with his daughter and [address] [place] [address] for Discharge: private vehicle      Factors facilitating achievement of predicted outcomes:     Barriers to discharge:  Medical stability      Patient Deficits:    []   Yes   [x]   No    If yes:    []   Confusion/Memory  []   Visual  []   Motor/Sensory         []   Right arm         []   Right leg         []   Left arm         []   Left leg  []   Language/Speech

## 2022-05-24 NOTE — PROGRESS NOTES
37.5 ml of morphine/bupivacaine wasted with Sid Mckeon RN in PACU. Epidural pump returned to PACU.      Electronically signed by Yvette Monzon RN on 5/24/2022 at 3:40 PM    Electronically signed by Sid Mckeon RN on 5/24/2022 at 3:41 PM

## 2022-05-24 NOTE — ANESTHESIA POSTPROCEDURE EVALUATION
Department of Anesthesiology  Postprocedure Note    Patient: Tati Odonnell  MRN: [id number]  [place]: 1955  Date of evaluation: 5/24/2022  Time:  3:04 PM     Procedure Summary     Date: 05/23/22 Room / Location: [facility] OR  / [facility]    Anesthesia Start: 6232 Anesthesia Stop: 0535    Procedure: ROBOTIC RIGHT UPPER LOBECTOMY (Right Chest) Diagnosis:       Malignant neoplasm of upper lobe of right lung ([address]      (C34.11)    Surgeons: Nilton Carter MD Responsible Provider: CAPO Willard CRNA    Anesthesia Type: general ASA Status: 4          Anesthesia Type: No value filed. Reilly Phase I: Reilly Score: 8    Reilly Phase II:      Last vitals: Reviewed and per EMR flowsheets. Anesthesia Post Evaluation    Patient location during evaluation: bedside  Patient participation: complete - patient participated  Level of consciousness: awake and awake and alert  Pain score: 0  Nausea & Vomiting: no nausea  Complications: no  Cardiovascular status: blood pressure returned to baseline  Respiratory status: acceptable  Comments: No complain of pain, requiring small dose of levophed. Will decrease epidural to 0.5cc and pca and then to off later in day. Then cath can be dc.     Site clean and dry and intact

## 2022-05-24 NOTE — PROGRESS NOTES
Four more laps completed around the ICU (600ft) with speed and vitality on tele and with RN. Pt states hopes he didn't wear me out. He is resting comfortably in his chair now. Levophed remains off, no c/o pain.

## 2022-05-24 NOTE — PLAN OF CARE
Problem: Discharge Planning  Goal: Discharge to home or other facility with appropriate resources  Outcome: Progressing  Flowsheets (Taken 5/23/2022 2000)  Discharge to home or other facility with appropriate resources: Identify barriers to discharge with patient and caregiver     Problem: Pain  Goal: Verbalizes/displays adequate comfort level or baseline comfort level  Outcome: Progressing  Flowsheets (Taken 5/23/2022 2113)  Verbalizes/displays adequate comfort level or baseline comfort level: Encourage patient to monitor pain and request assistance

## 2022-05-25 ENCOUNTER — APPOINTMENT (OUTPATIENT)
Dept: GENERAL RADIOLOGY | Age: 67
DRG: 164 | End: 2022-05-25
Attending: SURGERY
Payer: MEDICARE

## 2022-05-25 VITALS
WEIGHT: 171.5 LBS | HEART RATE: 90 BPM | DIASTOLIC BLOOD PRESSURE: 63 MMHG | TEMPERATURE: 98.4 F | OXYGEN SATURATION: 93 % | RESPIRATION RATE: 20 BRPM | HEIGHT: 70 IN | BODY MASS INDEX: 24.55 KG/M2 | SYSTOLIC BLOOD PRESSURE: 84 MMHG

## 2022-05-25 DIAGNOSIS — Z98.890 STATUS POST THORACOTOMY: Primary | ICD-10-CM

## 2022-05-25 PROCEDURE — 2580000003 HC RX 258: Performed by: NURSE PRACTITIONER

## 2022-05-25 PROCEDURE — 6360000002 HC RX W HCPCS: Performed by: NURSE PRACTITIONER

## 2022-05-25 PROCEDURE — 6370000000 HC RX 637 (ALT 250 FOR IP): Performed by: NURSE PRACTITIONER

## 2022-05-25 PROCEDURE — 71046 X-RAY EXAM CHEST 2 VIEWS: CPT

## 2022-05-25 PROCEDURE — 99024 POSTOP FOLLOW-UP VISIT: CPT | Performed by: SURGERY

## 2022-05-25 PROCEDURE — 71045 X-RAY EXAM CHEST 1 VIEW: CPT

## 2022-05-25 PROCEDURE — 94640 AIRWAY INHALATION TREATMENT: CPT

## 2022-05-25 RX ORDER — CLOPIDOGREL BISULFATE 75 MG/1
75 TABLET ORAL DAILY
Qty: 30 TABLET | Refills: 3 | OUTPATIENT
Start: 2022-05-25

## 2022-05-25 RX ORDER — FUROSEMIDE 40 MG/1
40 TABLET ORAL DAILY
Qty: 30 TABLET | Refills: 3 | OUTPATIENT
Start: 2022-05-25

## 2022-05-25 RX ORDER — SPIRONOLACTONE 25 MG/1
25 TABLET ORAL DAILY
Qty: 30 TABLET | Refills: 3 | OUTPATIENT
Start: 2022-05-25

## 2022-05-25 RX ORDER — LANOLIN ALCOHOL/MO/W.PET/CERES
3 CREAM (GRAM) TOPICAL NIGHTLY PRN
Qty: 30 TABLET | Refills: 3 | OUTPATIENT
Start: 2022-05-25

## 2022-05-25 RX ORDER — ASPIRIN 81 MG/1
81 TABLET ORAL DAILY
Qty: 30 TABLET | Refills: 3 | COMMUNITY
Start: 2022-05-25

## 2022-05-25 RX ORDER — SACUBITRIL AND VALSARTAN 24; 26 MG/1; MG/1
1 TABLET, FILM COATED ORAL 2 TIMES DAILY
Qty: 60 TABLET | Refills: 5 | OUTPATIENT
Start: 2022-05-25

## 2022-05-25 RX ORDER — OXYCODONE HYDROCHLORIDE 10 MG/1
10 TABLET ORAL EVERY 6 HOURS PRN
Qty: 12 TABLET | Refills: 0 | Status: SHIPPED | OUTPATIENT
Start: 2022-05-25 | End: 2022-05-28

## 2022-05-25 RX ORDER — MAGNESIUM GLUCONATE 27 MG(500)
250 TABLET ORAL DAILY
COMMUNITY
Start: 2022-05-25

## 2022-05-25 RX ADMIN — SODIUM CHLORIDE, PRESERVATIVE FREE 10 ML: 5 INJECTION INTRAVENOUS at 09:19

## 2022-05-25 RX ADMIN — IPRATROPIUM BROMIDE AND ALBUTEROL SULFATE 1 AMPULE: 2.5; .5 SOLUTION RESPIRATORY (INHALATION) at 11:09

## 2022-05-25 RX ADMIN — IPRATROPIUM BROMIDE AND ALBUTEROL SULFATE 1 AMPULE: 2.5; .5 SOLUTION RESPIRATORY (INHALATION) at 07:27

## 2022-05-25 RX ADMIN — KETOROLAC TROMETHAMINE 15 MG: 30 INJECTION, SOLUTION INTRAMUSCULAR at 06:33

## 2022-05-25 RX ADMIN — PANTOPRAZOLE SODIUM 40 MG: 40 TABLET, DELAYED RELEASE ORAL at 05:56

## 2022-05-25 ASSESSMENT — PAIN DESCRIPTION - LOCATION: LOCATION: CHEST

## 2022-05-25 ASSESSMENT — PAIN DESCRIPTION - ORIENTATION: ORIENTATION: RIGHT

## 2022-05-25 ASSESSMENT — PAIN SCALES - GENERAL
PAINLEVEL_OUTOF10: 0
PAINLEVEL_OUTOF10: 6

## 2022-05-25 NOTE — DISCHARGE SUMMARY
[address] [facility]  Discharge Summary    Patient ID: Mae Díaz      Patient's PCP: Catina Flores, [facility]    Admit Date: 5/23/2022     Discharge Date: 05/25/2022    Admitting Physician: Bossman Kee MD     Discharge Physician: Dr. Bossman Kee     Discharge Diagnoses:     Primary:   1. 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 CAD - PMHx CABG 11/2021 by Dr. Lit Llanos at [facility]. 3. Essential HTN  4. Mixed Hyperlipidemia  5. GERD  6. COPD with Hx Long-Term Cigarette Smoker (Quit 10/27/2021)     Procedures This Admit:   1. On 05/23/2022, Minimally Invasive, Robotic-Assisted, Right Upper Lobectomy with Regional Lymph Node Dissection by Dr. Bossman Kee. Consults:   None      Complications:   None    The patient was seen and examined on day of discharge and this discharge summary is in conjunction with any daily progress note from day of discharge. History of Present Illness and Hospital Course:   Ms. Shwetha Sim is a 77year old female with PMHx CAD, having acute anterior-lateral MI requiring CABG in November of last year, who was found to have incidental finding of a lung nodule, RUL on CT angiogram done in February of this year. She had a PET full body scan done on 04/05/2022 that demonstrated intense abnormal uptake of nodule in RUL, and mild uptake in RLL and LLL felt to be 2' to infectious process. There was also noted to be increased metabolic activity within the palatine tonsils. Patient was seen by ENT who did not appreciate any concerning lesions in the throat, but is following every 3 months. Was noted to have skin cancer on nose and was referred to [place], [place] for excision. He was seen by Dr. Ivanna Kenny, Oncology, who referred patient to Dr. Jose Mccallum, Pulmonology for recommendations regarding RUL nodule.      He was seen by Dr. Bing Blair on 04/20/2022 and underwent Ct guided needle biopsy on 04/29/2022. Pathology was consistent with squamous cell carcinoma with evidence of distant metastatic disease on PET/CT scan. Patient was referred to CT surgery for evaluation for surgical intervention. He was seen by Dr. Oren Ann, [facility] surgery on 05/11/2022. Patient was felt to be a candidate for RUL lobectomy via robotic-assist approach. The operation was explained and discussed in detail, including the risks and benefits, with the patient. He voiced understanding and was agreeable to proceed. Patient presented on 05/23/2022, taken to the OR and underwent minimally invasive, robotic-assisted, right upper lobectomy and regional lymph node dissection. He tolerated the operation without complication and was admitted to the ICU. On POD 1, he was sitting up in the chair, hemodynamically stable with good pain control. Labs and CXR were stable. Left CT to 20cm suction with minimal output, with intermittent air leak. On POD 2, CT was without air leak, therefore, suction was discontinued and he was felt stable for transfer to PCU for ongoing monitoring and therapy. CXR was repeated at noon, normal postoperative changes, therefore, the CT was removed and he was felt stable for discharge home. Disposition:  Home     Follow-up:    1. Hospital Follow-up with [facility] on 05/31/2022 at 130 pm.   2. Appointment with Dr. Oren Ann on 06/01/2022 at 1230 pm for Routine Postoperative Follow-up with CXR. Discharge Medications:     Discharge Medication List as of 5/25/2022  2:23 PM           Details   oxyCODONE (OXY-IR) 10 MG immediate release tablet Take 1 tablet by mouth every 6 hours as needed for Pain for up to 3 days. , Disp-12 tablet, R-0Normal              Details   Metoprolol Succinate 25 MG CS24 Take 12.5 mg by mouth daily Pt is taking 1/2 pill only, Disp-30 capsule, R-5Phone In      sacubitril-valsartan (ENTRESTO) 24-26 MG per tablet Take 1 tablet by mouth 2 times daily Pt did not have bottle to verify dose, Disp-60 tablet, R-5Phone In      furosemide (LASIX) 40 MG tablet Take 1 tablet by mouth daily, Disp-30 tablet, R-3Phone In      spironolactone (ALDACTONE) 25 MG tablet Take 1 tablet by mouth daily, Disp-30 tablet, R-3Phone In      melatonin 3 mg TABS tablet Take 1 tablet by mouth nightly as needed (sleep), Disp-30 tablet, R-3Phone In      clopidogrel (PLAVIX) 75 MG tablet Take 1 tablet by mouth daily, Disp-30 tablet, R-3Phone In      aspirin 81 MG EC tablet Take 1 tablet by mouth daily, Disp-30 tablet, R-3OTC      magnesium gluconate (MAGONATE) 500 MG tablet Take 0.5 tablets by mouth dailyOTC              Details   butalbital-acetaminophen-caffeine (FIORICET, ESGIC) -40 MG per tablet Take 1 tablet by mouth every 6 hours as needed for Headaches, Disp-120 tablet, R-0Normal      clonazePAM (KLONOPIN) 1 MG tablet Take 1 tablet by mouth 3 times daily as needed for Anxiety for up to 30 days. , Disp-90 tablet, R-0Normal      esomeprazole (NEXIUM) 40 MG delayed release capsule Take 1 capsule by mouth daily, Disp-30 capsule, R-11Normal      Misc.  Devices (ROLLATOR ULTRA-LIGHT) MISC Disp-1 each, R-0, PrintUse to walk

## 2022-05-25 NOTE — PLAN OF CARE
Problem: Discharge Planning  Goal: Discharge to home or other facility with appropriate resources  Outcome: Progressing     Problem: Pain  Goal: Verbalizes/displays adequate comfort level or baseline comfort level  Outcome: Progressing     Problem: ABCDS Injury Assessment  Goal: Absence of physical injury  Outcome: Progressing     Problem: Chronic Conditions and Co-morbidities  Goal: Patient's chronic conditions and co-morbidity symptoms are monitored and maintained or improved  Outcome: Progressing     Problem: Safety - Adult  Goal: Free from fall injury  Outcome: Progressing

## 2022-05-25 NOTE — PROGRESS NOTES
Progress Note    2022 7:15 AM          POD#   2    Subjective:  Mr. MEET MENDEZ is s/p RULobectomy, chest tube still in place. Minimal pain that is controlled with percocet. PULSE OXIMETRY RANGE: SpO2  Av.9 %  Min: 83 %  Max: 97 %  SUPPLEMENTAL O2: O2 Flow Rate (L/min): 6 L/min   Vital Signs: BP (!) 84/54 Comment: manual  Pulse (!) 119   Temp 97.9 °F (36.6 °C) (Temporal)   Resp 18   Ht 5' 10\" (1.778 m)   Wt 176 lb 6.4 oz (80 kg)   SpO2 91%   BMI 25.31 kg/m²    Temperature Range:   Temp: 97.9 °F (36.6 °C)   Temp  Av.9 °F (36.6 °C)  Min: 97.7 °F (36.5 °C)  Max: 98.1 °F (36.7 °C)                   Rhythm: normal sinus rhythm    Labs:   ABG:    Lab Results   Component Value Date    PHART 7.390 10/21/2021    PO2ART 67 10/21/2021    AZZ5NTK 41 10/21/2021    F8NOBQVU 93 10/21/2021    FCK9FUR 26 10/21/2021    BEART 0 10/21/2021     CBC:   Recent Labs     22  0424   WBC 10.5   HGB 12.1*   HCT 37.2*   MCV 92.1        BMP:   Recent Labs     22  0424   *   K 4.0   CL 96*   CO2 25   BUN 16   CREATININE 1.3*     PT/INR: No results for input(s): PROTIME, INR in the last 72 hours. APTT: No results for input(s): APTT in the last 72 hours. Chest X-Ray: right lung well expaded  No ptx  CT:  I/O last 3 completed shifts: In: 4424.9 [P.O.:3100; I.V.:1324.9]  Out: 7160 [Urine:6410; Chest Tube:750]      I/O last 3 completed shifts: In: 4424.9 [P.O.:3100;  I.V.:1324.9]  Out: 7160 [Urine:6410; Chest Tube:750]  Scheduled Meds:    pantoprazole  40 mg Oral QAM AC    sodium chloride flush  5-40 mL IntraVENous 2 times per day    [Held by provider] enoxaparin  40 mg SubCUTAneous Daily    ipratropium-albuterol  1 ampule Inhalation Q4H WA     Continuous Infusions:    sodium chloride       Exam:   General appearance: NAD  Neck: no bruits, no JVD, No lymph nodes   Lungs: no rhonchi, no wheezes, no rales     Heart: S1 and S2 no murmer, + rub  Abdomen: positive bowel sounds, no bruits, no masses  Extremities: warm and dry, no cyanosis, no clubbing  Port site Wounds:  Dressing dry and intact    Assessment  1. SP robotic RULobectomy. Path report noted. Stage 1.  2. Chest tube with no air leak but small ptx noted after waterseal trial.  Will repeat waterseal trial today and  Remove tube if no ptx on f/u CXR. 3. Possible d/c home today or tomorrow.       Patient Active Problem List   Diagnosis    Primary insomnia    ABHINAV (generalized anxiety disorder)    Essential hypertension    Chronic tension-type headache, not intractable    Acute ST elevation myocardial infarction (STEMI) of anteroseptal wall (HCC)    Heart attack (HCC)    Lung nodule    Smoking history    Hx of CABG    History of MI (myocardial infarction)    Squamous cell carcinoma of right lung (HCC)    Lung mass         Fredick Closs, MD Fredick Closs, MD

## 2022-05-27 ENCOUNTER — TELEPHONE (OUTPATIENT)
Dept: PRIMARY CARE CLINIC | Age: 67
End: 2022-05-27

## 2022-05-27 NOTE — TELEPHONE ENCOUNTER
[facility] Transitions Initial Follow Up Call    Outreach made within 2 business days of discharge: Yes    Patient: Julio César James Patient : 1955   MRN: [id number]  Reason for Admission: There are no discharge diagnoses documented for the most recent discharge. Discharge Date: 22       Spoke with: daughter    Discharge department/facility: [facility]     TCM Interactive Patient Contact:  Was patient able to fill all prescriptions: Yes  Was patient instructed to bring all medications to the follow-up visit: Yes  Is patient taking all medications as directed in the discharge summary?  Yes  Does patient understand their discharge instructions: Yes  Does patient have questions or concerns that need addressed prior to 7-14 day follow up office visit: yes -     Scheduled appointment with PCP within 7-14 days    Follow Up  Future Appointments   Date Time Provider George Green   2022  1:30 PM [facility] Loco [facility]   2022  1:15 PM MD SAJAN [facility]   2022 12:30 PM SCHEDULE, L MED ONC MA L MED ONC Rena \[facility]\""   2022 12:45 PM Beverly Bauer MD N PAD HEMONC [facility]   2022 11:00 AM MD Fide Guzman [facility]   2022  1:15 PM Jasmina Norton MD N LPS ENT [facility]       Jose Hedrick

## 2022-05-31 ENCOUNTER — HOSPITAL ENCOUNTER (INPATIENT)
Age: 67
LOS: 2 days | Discharge: HOME | DRG: 309 | End: 2022-06-02
Attending: EMERGENCY MEDICINE | Admitting: HOSPITALIST
Payer: MEDICARE

## 2022-05-31 ENCOUNTER — OFFICE VISIT (OUTPATIENT)
Dept: PRIMARY CARE CLINIC | Age: 67
End: 2022-05-31
Payer: MEDICARE

## 2022-05-31 ENCOUNTER — APPOINTMENT (OUTPATIENT)
Dept: GENERAL RADIOLOGY | Age: 67
DRG: 309 | End: 2022-05-31
Payer: MEDICARE

## 2022-05-31 VITALS
TEMPERATURE: 97 F | DIASTOLIC BLOOD PRESSURE: 50 MMHG | WEIGHT: 170 LBS | BODY MASS INDEX: 24.39 KG/M2 | SYSTOLIC BLOOD PRESSURE: 100 MMHG | OXYGEN SATURATION: 97 % | HEART RATE: 181 BPM

## 2022-05-31 DIAGNOSIS — C34.11 MALIGNANT NEOPLASM OF UPPER LOBE OF RIGHT LUNG (HCC): ICD-10-CM

## 2022-05-31 DIAGNOSIS — Z98.890 S/P THORACOTOMY: ICD-10-CM

## 2022-05-31 DIAGNOSIS — I48.91 NEW ONSET ATRIAL FIBRILLATION (HCC): ICD-10-CM

## 2022-05-31 DIAGNOSIS — I48.91 NEW ONSET A-FIB (HCC): Primary | ICD-10-CM

## 2022-05-31 DIAGNOSIS — Z09 HOSPITAL DISCHARGE FOLLOW-UP: ICD-10-CM

## 2022-05-31 DIAGNOSIS — G44.229 CHRONIC TENSION-TYPE HEADACHE, NOT INTRACTABLE: ICD-10-CM

## 2022-05-31 DIAGNOSIS — I95.9 HYPOTENSION, UNSPECIFIED HYPOTENSION TYPE: ICD-10-CM

## 2022-05-31 DIAGNOSIS — F41.1 GAD (GENERALIZED ANXIETY DISORDER): ICD-10-CM

## 2022-05-31 DIAGNOSIS — R06.02 SOB (SHORTNESS OF BREATH): ICD-10-CM

## 2022-05-31 DIAGNOSIS — I49.9 IRREGULAR HEART RATE: ICD-10-CM

## 2022-05-31 DIAGNOSIS — I48.91 ATRIAL FIBRILLATION WITH RAPID VENTRICULAR RESPONSE (HCC): ICD-10-CM

## 2022-05-31 DIAGNOSIS — I48.91 ATRIAL FIBRILLATION WITH RVR (HCC): Primary | ICD-10-CM

## 2022-05-31 DIAGNOSIS — F51.01 PRIMARY INSOMNIA: ICD-10-CM

## 2022-05-31 PROBLEM — Z90.2 S/P LOBECTOMY OF LUNG: Status: ACTIVE | Noted: 2022-05-31

## 2022-05-31 LAB
ALBUMIN SERPL-MCNC: 3.6 G/DL (ref 3.5–5.2)
ALP BLD-CCNC: 65 U/L (ref 40–130)
ALT SERPL-CCNC: 29 U/L (ref 5–41)
ANION GAP SERPL CALCULATED.3IONS-SCNC: 12 MMOL/L (ref 7–19)
APTT: 26.5 SEC (ref 26–36.2)
AST SERPL-CCNC: 26 U/L (ref 5–40)
BASOPHILS ABSOLUTE: 0.1 K/UL (ref 0–0.2)
BASOPHILS RELATIVE PERCENT: 1.2 % (ref 0–1)
BILIRUB SERPL-MCNC: 0.3 MG/DL (ref 0.2–1.2)
BILIRUBIN URINE: NEGATIVE
BLOOD, URINE: NEGATIVE
BUN BLDV-MCNC: 12 MG/DL (ref 8–23)
CALCIUM SERPL-MCNC: 9.4 MG/DL (ref 8.8–10.2)
CHLORIDE BLD-SCNC: 99 MMOL/L (ref 98–111)
CLARITY: CLEAR
CO2: 27 MMOL/L (ref 22–29)
COLOR: YELLOW
CREAT SERPL-MCNC: 1 MG/DL (ref 0.5–1.2)
EOSINOPHILS ABSOLUTE: 0.5 K/UL (ref 0–0.6)
EOSINOPHILS RELATIVE PERCENT: 6.3 % (ref 0–5)
GFR AFRICAN AMERICAN: >59
GFR NON-AFRICAN AMERICAN: >60
GLUCOSE BLD-MCNC: 134 MG/DL (ref 74–109)
GLUCOSE URINE: NEGATIVE MG/DL
HCT VFR BLD CALC: 38.3 % (ref 42–52)
HEMOGLOBIN: 12.5 G/DL (ref 14–18)
IMMATURE GRANULOCYTES #: 0.1 K/UL
INR BLD: 1.01 (ref 0.88–1.18)
KETONES, URINE: NEGATIVE MG/DL
LEUKOCYTE ESTERASE, URINE: NEGATIVE
LYMPHOCYTES ABSOLUTE: 2 K/UL (ref 1.1–4.5)
LYMPHOCYTES RELATIVE PERCENT: 23.7 % (ref 20–40)
MCH RBC QN AUTO: 30.2 PG (ref 27–31)
MCHC RBC AUTO-ENTMCNC: 32.6 G/DL (ref 33–37)
MCV RBC AUTO: 92.5 FL (ref 80–94)
MONOCYTES ABSOLUTE: 0.8 K/UL (ref 0–0.9)
MONOCYTES RELATIVE PERCENT: 9.3 % (ref 0–10)
NEUTROPHILS ABSOLUTE: 5.1 K/UL (ref 1.5–7.5)
NEUTROPHILS RELATIVE PERCENT: 58.8 % (ref 50–65)
NITRITE, URINE: NEGATIVE
PDW BLD-RTO: 15.1 % (ref 11.5–14.5)
PH UA: 7 (ref 5–8)
PLATELET # BLD: 360 K/UL (ref 130–400)
PMV BLD AUTO: 10.4 FL (ref 9.4–12.4)
POTASSIUM REFLEX MAGNESIUM: 4.1 MMOL/L (ref 3.5–5)
PRO-BNP: 7323 PG/ML (ref 0–900)
PROTEIN UA: NEGATIVE MG/DL
PROTHROMBIN TIME: 13.2 SEC (ref 12–14.6)
RBC # BLD: 4.14 M/UL (ref 4.7–6.1)
SARS-COV-2, NAAT: NOT DETECTED
SODIUM BLD-SCNC: 138 MMOL/L (ref 136–145)
SPECIFIC GRAVITY UA: 1.01 (ref 1–1.03)
TOTAL PROTEIN: 7.2 G/DL (ref 6.6–8.7)
TROPONIN: <0.01 NG/ML (ref 0–0.03)
TROPONIN: <0.01 NG/ML (ref 0–0.03)
TSH REFLEX FT4: 2.91 UIU/ML (ref 0.35–5.5)
UROBILINOGEN, URINE: 1 E.U./DL
WBC # BLD: 8.6 K/UL (ref 4.8–10.8)

## 2022-05-31 PROCEDURE — 87635 SARS-COV-2 COVID-19 AMP PRB: CPT

## 2022-05-31 PROCEDURE — 2500000003 HC RX 250 WO HCPCS: Performed by: EMERGENCY MEDICINE

## 2022-05-31 PROCEDURE — 96375 TX/PRO/DX INJ NEW DRUG ADDON: CPT

## 2022-05-31 PROCEDURE — 99496 TRANSJ CARE MGMT HIGH F2F 7D: CPT | Performed by: NURSE PRACTITIONER

## 2022-05-31 PROCEDURE — 2580000003 HC RX 258

## 2022-05-31 PROCEDURE — 84443 ASSAY THYROID STIM HORMONE: CPT

## 2022-05-31 PROCEDURE — 6360000002 HC RX W HCPCS

## 2022-05-31 PROCEDURE — 71045 X-RAY EXAM CHEST 1 VIEW: CPT

## 2022-05-31 PROCEDURE — 83880 ASSAY OF NATRIURETIC PEPTIDE: CPT

## 2022-05-31 PROCEDURE — 96374 THER/PROPH/DIAG INJ IV PUSH: CPT

## 2022-05-31 PROCEDURE — 85730 THROMBOPLASTIN TIME PARTIAL: CPT

## 2022-05-31 PROCEDURE — 85025 COMPLETE CBC W/AUTO DIFF WBC: CPT

## 2022-05-31 PROCEDURE — 2500000003 HC RX 250 WO HCPCS: Performed by: HOSPITALIST

## 2022-05-31 PROCEDURE — 93005 ELECTROCARDIOGRAM TRACING: CPT | Performed by: EMERGENCY MEDICINE

## 2022-05-31 PROCEDURE — 93000 ELECTROCARDIOGRAM COMPLETE: CPT | Performed by: NURSE PRACTITIONER

## 2022-05-31 PROCEDURE — 6370000000 HC RX 637 (ALT 250 FOR IP)

## 2022-05-31 PROCEDURE — 36415 COLL VENOUS BLD VENIPUNCTURE: CPT

## 2022-05-31 PROCEDURE — 2140000000 HC CCU INTERMEDIATE R&B

## 2022-05-31 PROCEDURE — 85610 PROTHROMBIN TIME: CPT

## 2022-05-31 PROCEDURE — 99285 EMERGENCY DEPT VISIT HI MDM: CPT

## 2022-05-31 PROCEDURE — 84484 ASSAY OF TROPONIN QUANT: CPT

## 2022-05-31 PROCEDURE — 71045 X-RAY EXAM CHEST 1 VIEW: CPT | Performed by: RADIOLOGY

## 2022-05-31 PROCEDURE — 81003 URINALYSIS AUTO W/O SCOPE: CPT

## 2022-05-31 PROCEDURE — 1111F DSCHRG MED/CURRENT MED MERGE: CPT | Performed by: NURSE PRACTITIONER

## 2022-05-31 PROCEDURE — 80053 COMPREHEN METABOLIC PANEL: CPT

## 2022-05-31 PROCEDURE — 6360000002 HC RX W HCPCS: Performed by: EMERGENCY MEDICINE

## 2022-05-31 RX ORDER — ACETAMINOPHEN 650 MG/1
650 SUPPOSITORY RECTAL EVERY 6 HOURS PRN
Status: DISCONTINUED | OUTPATIENT
Start: 2022-05-31 | End: 2022-06-02 | Stop reason: HOSPADM

## 2022-05-31 RX ORDER — SODIUM CHLORIDE 0.9 % (FLUSH) 0.9 %
5-40 SYRINGE (ML) INJECTION PRN
Status: DISCONTINUED | OUTPATIENT
Start: 2022-05-31 | End: 2022-06-02 | Stop reason: HOSPADM

## 2022-05-31 RX ORDER — PANTOPRAZOLE SODIUM 40 MG/1
40 TABLET, DELAYED RELEASE ORAL
Refills: 11 | Status: DISCONTINUED | OUTPATIENT
Start: 2022-06-01 | End: 2022-06-02 | Stop reason: HOSPADM

## 2022-05-31 RX ORDER — ASPIRIN 81 MG/1
81 TABLET ORAL DAILY
Status: DISCONTINUED | OUTPATIENT
Start: 2022-05-31 | End: 2022-06-02 | Stop reason: HOSPADM

## 2022-05-31 RX ORDER — CLOPIDOGREL BISULFATE 75 MG/1
75 TABLET ORAL DAILY
Status: DISCONTINUED | OUTPATIENT
Start: 2022-05-31 | End: 2022-06-02 | Stop reason: HOSPADM

## 2022-05-31 RX ORDER — DIGOXIN 0.25 MG/ML
250 INJECTION INTRAMUSCULAR; INTRAVENOUS ONCE
Status: COMPLETED | OUTPATIENT
Start: 2022-05-31 | End: 2022-05-31

## 2022-05-31 RX ORDER — ONDANSETRON 4 MG/1
4 TABLET, ORALLY DISINTEGRATING ORAL EVERY 8 HOURS PRN
Status: DISCONTINUED | OUTPATIENT
Start: 2022-05-31 | End: 2022-06-02 | Stop reason: HOSPADM

## 2022-05-31 RX ORDER — DILTIAZEM HYDROCHLORIDE 5 MG/ML
12.5 INJECTION INTRAVENOUS ONCE
Status: COMPLETED | OUTPATIENT
Start: 2022-05-31 | End: 2022-05-31

## 2022-05-31 RX ORDER — OXYCODONE AND ACETAMINOPHEN 10; 325 MG/1; MG/1
0.5 TABLET ORAL AS NEEDED
COMMUNITY
End: 2022-09-12

## 2022-05-31 RX ORDER — DILTIAZEM HCL IN NACL,ISO-OSM 125 MG/125
2.5-15 PLASTIC BAG, INJECTION (ML) INTRAVENOUS CONTINUOUS
Status: DISCONTINUED | OUTPATIENT
Start: 2022-05-31 | End: 2022-06-02 | Stop reason: HOSPADM

## 2022-05-31 RX ORDER — ACETAMINOPHEN 325 MG/1
650 TABLET ORAL EVERY 6 HOURS PRN
Status: DISCONTINUED | OUTPATIENT
Start: 2022-05-31 | End: 2022-06-02 | Stop reason: HOSPADM

## 2022-05-31 RX ORDER — BUTALBITAL, ACETAMINOPHEN AND CAFFEINE 50; 325; 40 MG/1; MG/1; MG/1
1 TABLET ORAL EVERY 6 HOURS PRN
Qty: 120 TABLET | Refills: 0 | Status: SHIPPED | OUTPATIENT
Start: 2022-05-31 | End: 2022-09-12 | Stop reason: SDUPTHER

## 2022-05-31 RX ORDER — SODIUM CHLORIDE 9 MG/ML
INJECTION, SOLUTION INTRAVENOUS PRN
Status: DISCONTINUED | OUTPATIENT
Start: 2022-05-31 | End: 2022-06-02 | Stop reason: HOSPADM

## 2022-05-31 RX ORDER — LANOLIN ALCOHOL/MO/W.PET/CERES
3 CREAM (GRAM) TOPICAL NIGHTLY PRN
Status: DISCONTINUED | OUTPATIENT
Start: 2022-05-31 | End: 2022-06-02 | Stop reason: HOSPADM

## 2022-05-31 RX ORDER — ONDANSETRON 2 MG/ML
4 INJECTION INTRAMUSCULAR; INTRAVENOUS EVERY 6 HOURS PRN
Status: DISCONTINUED | OUTPATIENT
Start: 2022-05-31 | End: 2022-06-02 | Stop reason: HOSPADM

## 2022-05-31 RX ORDER — POLYETHYLENE GLYCOL 3350 17 G/17G
17 POWDER, FOR SOLUTION ORAL DAILY PRN
Status: DISCONTINUED | OUTPATIENT
Start: 2022-05-31 | End: 2022-06-02 | Stop reason: HOSPADM

## 2022-05-31 RX ORDER — CLONAZEPAM 1 MG/1
1 TABLET ORAL 3 TIMES DAILY PRN
Status: DISCONTINUED | OUTPATIENT
Start: 2022-05-31 | End: 2022-06-02 | Stop reason: HOSPADM

## 2022-05-31 RX ORDER — ATORVASTATIN CALCIUM 40 MG/1
40 TABLET, FILM COATED ORAL NIGHTLY
COMMUNITY

## 2022-05-31 RX ORDER — CLONAZEPAM 1 MG/1
1 TABLET ORAL 3 TIMES DAILY PRN
Qty: 90 TABLET | Refills: 0 | Status: SHIPPED | OUTPATIENT
Start: 2022-05-31 | End: 2022-09-12 | Stop reason: SDUPTHER

## 2022-05-31 RX ORDER — UREA 10 %
250 LOTION (ML) TOPICAL DAILY
Status: DISCONTINUED | OUTPATIENT
Start: 2022-05-31 | End: 2022-06-02 | Stop reason: HOSPADM

## 2022-05-31 RX ORDER — ENOXAPARIN SODIUM 100 MG/ML
1 INJECTION SUBCUTANEOUS 2 TIMES DAILY
Status: DISCONTINUED | OUTPATIENT
Start: 2022-05-31 | End: 2022-06-02

## 2022-05-31 RX ORDER — METOPROLOL SUCCINATE 25 MG/1
12.5 TABLET, EXTENDED RELEASE ORAL DAILY
Status: DISCONTINUED | OUTPATIENT
Start: 2022-05-31 | End: 2022-06-01

## 2022-05-31 RX ORDER — SODIUM CHLORIDE 0.9 % (FLUSH) 0.9 %
5-40 SYRINGE (ML) INJECTION EVERY 12 HOURS SCHEDULED
Status: DISCONTINUED | OUTPATIENT
Start: 2022-05-31 | End: 2022-06-02 | Stop reason: HOSPADM

## 2022-05-31 RX ADMIN — DIGOXIN 250 MCG: 0.25 INJECTION INTRAMUSCULAR; INTRAVENOUS at 16:13

## 2022-05-31 RX ADMIN — Medication 3 MG: at 21:13

## 2022-05-31 RX ADMIN — METOPROLOL SUCCINATE 12.5 MG: 25 TABLET, EXTENDED RELEASE ORAL at 20:21

## 2022-05-31 RX ADMIN — SODIUM CHLORIDE, PRESERVATIVE FREE 10 ML: 5 INJECTION INTRAVENOUS at 20:23

## 2022-05-31 RX ADMIN — Medication 5 MG/HR: at 19:15

## 2022-05-31 RX ADMIN — DILTIAZEM HYDROCHLORIDE 12.5 MG: 5 INJECTION INTRAVENOUS at 15:22

## 2022-05-31 RX ADMIN — ASPIRIN 81 MG: 81 TABLET, COATED ORAL at 20:20

## 2022-05-31 RX ADMIN — CLOPIDOGREL BISULFATE 75 MG: 75 TABLET ORAL at 20:23

## 2022-05-31 RX ADMIN — ENOXAPARIN SODIUM 80 MG: 100 INJECTION SUBCUTANEOUS at 20:21

## 2022-05-31 RX ADMIN — SACUBITRIL AND VALSARTAN 1 TABLET: 24; 26 TABLET, FILM COATED ORAL at 20:22

## 2022-05-31 ASSESSMENT — ENCOUNTER SYMPTOMS
EYE DISCHARGE: 0
CHEST TIGHTNESS: 0
COUGH: 0
EYE REDNESS: 0
COLOR CHANGE: 0
CONSTIPATION: 0
VOMITING: 0
ABDOMINAL PAIN: 0
RHINORRHEA: 0
BLOOD IN STOOL: 0
WHEEZING: 0
CONSTIPATION: 0
COUGH: 1
CHOKING: 0
DIARRHEA: 0
SHORTNESS OF BREATH: 1
SORE THROAT: 0
WHEEZING: 0
SHORTNESS OF BREATH: 1
ABDOMINAL DISTENTION: 0
NAUSEA: 0

## 2022-05-31 ASSESSMENT — PAIN SCALES - GENERAL
PAINLEVEL_OUTOF10: 0

## 2022-05-31 NOTE — PROGRESS NOTES
Post-Discharge Transitional Care Follow Up      Tish Rinne   YOB: 1955    Date of Office Visit:  5/31/2022  Date of Hospital Admission: 5/23/22  Date of Hospital Discharge: 5/25/22  Readmission Risk Score (high >=14%. Medium >=10%):Readmission Risk Score: 12.8 ( )      Care management risk score Rising risk (score 2-5) and Complex Care (Scores >=6): 2     Non face to face  following discharge, date last encounter closed (first attempt may have been earlier): 5/27/2022  2:48 PM     Call initiated 2 business days of discharge: Yes     New onset a-fib ([address]  Irregular heart rate  -     EKG [address] discharge follow-up  -     WI DISCHARGE MEDS RECONCILED W/ CURRENT OUTPATIENT MED LIST  Atrial fibrillation with rapid ventricular response ([address]  Malignant neoplasm of upper lobe of right lung (HCC)  S/P thoracotomy  Hypotension, unspecified hypotension type  SOB (shortness of breath)  Primary insomnia  -     clonazePAM (KLONOPIN) 1 MG tablet; Take 1 tablet by mouth 3 times daily as needed for Anxiety for up to 30 days. , Disp-90 tablet, R-0Normal  ABHINAV (generalized anxiety disorder)  -     clonazePAM (KLONOPIN) 1 MG tablet; Take 1 tablet by mouth 3 times daily as needed for Anxiety for up to 30 days. , Disp-90 tablet, R-0Normal  Chronic tension-type headache, not intractable  -     butalbital-acetaminophen-caffeine (FIORICET, ESGIC) -40 MG per tablet; Take 1 tablet by mouth every 6 hours as needed for Headaches, Disp-120 tablet, R-0Normal    Patient declined ambulance. His daughter is here with him and will take him to the ER. He is currently in stable condition. Report called to [facility] ED    Medical Decision Making: high complexity  Return in about 1 week (around 6/7/2022).     On this date 5/31/2022 I have spent 45 minutes reviewing previous notes, test results and face to face with the patient discussing the diagnosis and importance of compliance with the treatment plan as well as documenting on the day of the visit. Subjective:   HPI    Inpatient course: Discharge summary reviewed- see chart. Interval history/Current status: Patient is here for a hospital follow-up. On 523 he had a thoracotomy right upper lobe resection due to neoplasm. Since then he has developed some shortness of breath and fatigue. Has to sit down if he is up and walking. Reports that his blood pressure has been reading low sometimes 70/50. He has not paid attention to his heart rate. When he came into the office heart rate was ranging from 140-181. EKG shows new onset of A. fib with RVR. Patient Active Problem List   Diagnosis    Primary insomnia    ABHINAV (generalized anxiety disorder)    Essential hypertension    Chronic tension-type headache, not intractable    Acute ST elevation myocardial infarction (STEMI) of anteroseptal wall (HCC)    Heart attack ([facility]    Lung nodule    Smoking history    Hx of CABG    History of MI (myocardial infarction)    Squamous cell carcinoma of right lung ([facility]    Lung mass       Medications listed as ordered at the time of discharge from hospital     Medication List          Accurate as of May 31, 2022  3:26 PM. If you have any questions, ask your nurse or doctor. CONTINUE taking these medications    aspirin 81 MG EC tablet  Take 1 tablet by mouth daily     butalbital-acetaminophen-caffeine -40 MG per tablet  Commonly known as: FIORICET, ESGIC  Take 1 tablet by mouth every 6 hours as needed for Headaches     clonazePAM 1 MG tablet  Commonly known as: KlonoPIN  Take 1 tablet by mouth 3 times daily as needed for Anxiety for up to 30 days.      clopidogrel 75 MG tablet  Commonly known as: PLAVIX  Take 1 tablet by mouth daily     Entresto 24-26 MG per tablet  Generic drug: sacubitril-valsartan  Take 1 tablet by mouth 2 times daily Pt did not have bottle to verify dose     esomeprazole 40 MG delayed release capsule  Commonly known as: NexIUM  Take 1 capsule by mouth daily     furosemide 40 MG tablet  Commonly known as: LASIX  Take 1 tablet by mouth daily     magnesium gluconate 500 MG tablet  Commonly known as: MAGONATE  Take 0.5 tablets by mouth daily     melatonin 3 mg Tabs tablet  Take 1 tablet by mouth nightly as needed (sleep)     Metoprolol Succinate 25 MG Cs24  Take 12.5 mg by mouth daily Pt is taking 1/2 pill only     Rollator Ultra-Light Misc  Use to walk     spironolactone 25 MG tablet  Commonly known as: ALDACTONE  Take 1 tablet by mouth daily           Where to Get Your Medications      These medications were sent to [address] [place] [address], [address], [address] [facility] [facility]    Phone: [phone]   · butalbital-acetaminophen-caffeine -40 MG per tablet  · clonazePAM 1 MG tablet          Medications marked \"taking\" at this time  Outpatient Medications Marked as Taking for the 5/31/22 encounter (Office Visit) with [facility]   Medication Sig Dispense Refill    clonazePAM (KLONOPIN) 1 MG tablet Take 1 tablet by mouth 3 times daily as needed for Anxiety for up to 30 days.  90 tablet 0    butalbital-acetaminophen-caffeine (FIORICET, ESGIC) -40 MG per tablet Take 1 tablet by mouth every 6 hours as needed for Headaches 120 tablet 0    Metoprolol Succinate 25 MG CS24 Take 12.5 mg by mouth daily Pt is taking 1/2 pill only 30 capsule 5    sacubitril-valsartan (ENTRESTO) 24-26 MG per tablet Take 1 tablet by mouth 2 times daily Pt did not have bottle to verify dose 60 tablet 5    furosemide (LASIX) 40 MG tablet Take 1 tablet by mouth daily 30 tablet 3    spironolactone (ALDACTONE) 25 MG tablet Take 1 tablet by mouth daily 30 tablet 3    melatonin 3 mg TABS tablet Take 1 tablet by mouth nightly as needed (sleep) 30 tablet 3    clopidogrel (PLAVIX) 75 MG tablet Take 1 tablet by mouth daily 30 tablet 3    aspirin 81 MG EC tablet Take 1 tablet by mouth daily 30 tablet 3    magnesium gluconate (MAGONATE) 500 MG tablet Take 0.5 tablets by mouth daily      esomeprazole (NEXIUM) 40 MG delayed release capsule Take 1 capsule by mouth daily 30 capsule 11    Misc. Devices (ROLLATOR ULTRA-LIGHT) MISC Use to walk 1 each 0        Medications patient taking as of now reconciled against medications ordered at time of hospital discharge: Yes    Review of Systems   Constitutional: Positive for fatigue. Negative for appetite change and unexpected weight change. HENT: Negative for congestion, ear pain, rhinorrhea and sore throat. Eyes: Negative for discharge and redness. Respiratory: Positive for shortness of breath. Negative for cough, choking and wheezing. Cardiovascular: Negative for chest pain. Gastrointestinal: Negative for blood in stool, constipation and diarrhea. Genitourinary: Negative for decreased urine volume and dysuria. Musculoskeletal: Positive for arthralgias. Skin: Negative for rash. Neurological: Positive for headaches. Negative for weakness. Hematological: Negative for adenopathy. Psychiatric/Behavioral: Negative for suicidal ideas. Objective:    BP (!) 100/50   Pulse (!) 181   Temp 97 °F (36.1 °C) (Temporal)   Wt 170 lb (77.1 kg)   SpO2 97%   BMI 24.39 kg/m²   Physical Exam  Vitals reviewed. Constitutional:       Appearance: He is well-developed. HENT:      Head: Normocephalic. Nose: Nasal deformity (septum removed, wears bandage over nose) present. Mouth/Throat:      Dentition: Abnormal dentition. Eyes:      Conjunctiva/sclera: Conjunctivae normal.   Cardiovascular:      Rate and Rhythm: Tachycardia present. Rhythm irregular. Heart sounds: Normal heart sounds. Pulmonary:      Effort: Pulmonary effort is normal.      Breath sounds: Normal breath sounds. No wheezing or rales. Abdominal:      General: Bowel sounds are normal.      Palpations: Abdomen is soft. Tenderness:  There is no abdominal tenderness. Musculoskeletal:      Cervical back: Normal range of motion and neck supple. Skin:     General: Skin is warm and dry. Comments: Multiple AK on arms, hands,ears, and face   Neurological:      Mental Status: He is alert and oriented to person, place, and time. Psychiatric:         Behavior: Behavior normal.         An electronic signature was used to authenticate this note.   --[facility] [place]

## 2022-05-31 NOTE — H&P
Sanjeev Perez - History & Physical      PCP: CAPO Doshi    Date of Admission: 5/31/2022    Date of Service: 5/31/2022    Chief Complaint: Irregular heart rate    History Of Present Illness: The patient is a 77 y.o. male who presented to [facility] ER with PMH CAD, CABG, HTN, hyperlipidemia, GERD, COPD, former smoker, skin cancer to nose, complaining of irregular heart rate. Patient was recently hospitalized on 5/23 due to right upper lobe lung mass. Found to have squamous cell carcinoma with evidence of distant metastatic disease on PET /CT scan. While admitted he had a minimally invasive robotic assisted right upper lobectomy with regional lymph node dissection by Dr. Catracho Nguyen. Patient was discharged in stable condition on 5/25. Today patient presented to PCP for hospital follow-up. He states that he has had fatigue and shortness of breath since discharge. While at PCP office patient found to have heart rate ranging from 140-180 and new onset atrial fibrillation. Denies palpitation, lightheadedness, dizziness, and chest pain. Patient was instructed to [facility] ER for further evaluation. Work-up in ER CXR no acute cardiopulmonary process, troponin negative, BNP 7323, TSH with reflex 2.91, urinalysis negative, and COVID-negative. Received digoxin 250 MCG IV, diltiazem 12.5 mg IV while in ER. Patient is to be admitted to the hospitalist service with consultation to cardiology due to new onset atrial fibrillation. Courtesy consult to cardiothoracic surgery from recent lobectomy.   Past Medical History:        Diagnosis Date    Arthritis     hands    CAD (coronary artery disease)     Cancer ([facility] [address]     Septum     CHF (congestive heart failure) ([facility] [address]     sees dr. Lesvia Vargas COPD (chronic obstructive pulmonary disease) ([facility] [address]     Headache     Hypertension     Lung nodule 12/2021    right upper lobe    MI [facility] infarction[facility])        Past Surgical History:        Procedure Laterality Date    CARDIAC SURGERY      CORONARY ARTERY BYPASS GRAFT  10/2021    CT NEEDLE BIOPSY LUNG PERCUTANEOUS  4/29/2022    CT NEEDLE BIOPSY LUNG PERCUTANEOUS 4/29/2022 [facility] SCAN    LUNG REMOVAL, TOTAL Right 5/23/2022    ROBOTIC RIGHT UPPER LOBECTOMY performed by Louie Esquivel MD at [facility] SURGERY  09/2016    SKIN BIOPSY      nose       Home Medications:  Prior to Admission medications    Medication Sig Start Date End Date Taking? Authorizing Provider   clonazePAM (KLONOPIN) 1 MG tablet Take 1 tablet by mouth 3 times daily as needed for Anxiety for up to 30 days. 5/31/22 6/30/22  CAPO Ocasio   butalbital-acetaminophen-caffeine (FIORICET, ESGIC) -57 MG per tablet Take 1 tablet by mouth every 6 hours as needed for Headaches 5/31/22   CAPO Ocasio   Metoprolol Succinate 25 MG CS24 Take 12.5 mg by mouth daily Pt is taking 1/2 pill only 5/25/22   CAPO Spicer   sacubitril-valsartan (ENTRESTO) 24-26 MG per tablet Take 1 tablet by mouth 2 times daily Pt did not have bottle to verify dose 5/25/22   CAPO Spicer   furosemide (LASIX) 40 MG tablet Take 1 tablet by mouth daily 5/25/22   Latasha Borges, CAPO   spironolactone (ALDACTONE) 25 MG tablet Take 1 tablet by mouth daily 5/25/22   Latasha Borges, CAPO   melatonin 3 mg TABS tablet Take 1 tablet by mouth nightly as needed (sleep) 5/25/22   CAPO Spicer   clopidogrel (PLAVIX) 75 MG tablet Take 1 tablet by mouth daily 5/25/22   Latasha Borges, CAPO   aspirin 81 MG EC tablet Take 1 tablet by mouth daily 5/25/22   Latasha Borges, CAPO   magnesium gluconate (MAGONATE) 500 MG tablet Take 0.5 tablets by mouth daily 5/25/22   CAPO Spicer   esomeprazole (NEXIUM) 40 MG delayed release capsule Take 1 capsule by mouth daily 11/8/21   CAPO Moulton   Misc.  Devices (ROLLATOR ULTRA-LIGHT) MISC Use to walk 10/29/21   CAPO Ocasio       Allergies:    Patient has no known allergies. Social History:    The patient currently lives home  Tobacco:   reports that he quit smoking about 7 months ago. His smoking use included cigarettes. He started smoking about 51 years ago. He has a 102.00 pack-year smoking history. He has quit using smokeless tobacco.  Alcohol:   reports no history of alcohol use. Illicit Drugs: denies    Family History:      Problem Relation Age of Onset    Cancer Mother     Coronary Art Dis Father     Coronary Art Dis Brother        Review of Systems:   Review of Systems   Constitutional: Positive for activity change and fatigue. Negative for chills, diaphoresis and fever. Respiratory: Positive for cough and shortness of breath. Negative for chest tightness and wheezing. Cardiovascular: Negative for chest pain and palpitations. Gastrointestinal: Negative for abdominal distention, abdominal pain, constipation, nausea and vomiting. Genitourinary: Negative. Skin: Negative for color change, pallor and rash. Neurological: Positive for weakness. Negative for tremors, syncope, light-headedness, numbness and headaches. Psychiatric/Behavioral: Negative for agitation, behavioral problems and confusion. 14 point review of systems is negative except as specifically addressed above. Physical Examination:  /74   Pulse 86   Temp 98.9 °F (37.2 °C) (Oral)   Resp 30   SpO2 94%   Physical Exam  Vitals and nursing note reviewed. Constitutional:       Appearance: Normal appearance. He is not ill-appearing. HENT:      Mouth/Throat:      Mouth: Mucous membranes are moist.      Pharynx: Oropharynx is clear. Eyes:      Extraocular Movements: Extraocular movements intact. Conjunctiva/sclera: Conjunctivae normal.      Pupils: Pupils are equal, round, and reactive to light. Cardiovascular:      Rate and Rhythm: Tachycardia present. Rhythm irregular. Pulses: Normal pulses. Heart sounds: Normal heart sounds.  No murmur heard.      Pulmonary:      Effort: Pulmonary effort is normal. No tachypnea, accessory muscle usage or respiratory distress. Breath sounds: Rhonchi present. Abdominal:      General: Bowel sounds are normal. There is no distension. Palpations: Abdomen is soft. Tenderness: There is no abdominal tenderness. There is no guarding or rebound. Musculoskeletal:         General: No swelling. Normal range of motion. Cervical back: Normal range of motion and neck supple. No rigidity or tenderness. Right lower leg: No edema. Left lower leg: No edema. Skin:     General: Skin is warm and dry. Capillary Refill: Capillary refill takes less than 2 seconds. Neurological:      General: No focal deficit present. Mental Status: He is alert and oriented to person, place, and time. Cranial Nerves: No cranial nerve deficit. Motor: Weakness present. Psychiatric:         Mood and Affect: Mood normal.         Behavior: Behavior normal.          Diagnostic Data:  CBC:  Recent Labs     05/31/22  1511   WBC 8.6   HGB 12.5*   HCT 38.3*        BMP:  Recent Labs     05/31/22  1511      K 4.1   CL 99   CO2 27   BUN 12   CREATININE 1.0   CALCIUM 9.4     Recent Labs     05/31/22  1511   AST 26   ALT 29   BILITOT 0.3   ALKPHOS 65     Coag Panel:   Recent Labs     05/31/22  1511   INR 1.01   PROTIME 13.2   APTT 26.5     Cardiac Enzymes:   Recent Labs     05/31/22 1511   TROPONINI <0.01     ABGs:  Lab Results   Component Value Date    PHART 7.390 10/21/2021    PO2ART 67 10/21/2021    YTF0DTY 41 10/21/2021     Urinalysis:  Lab Results   Component Value Date    NITRU Negative 05/31/2022    BLOODU Negative 05/31/2022    SPECGRAV 1.009 05/31/2022    GLUCOSEU Negative 05/31/2022       XR CHEST PORTABLE    Result Date: 5/31/2022  NO PRIOR REPORT AVAILABLE Exam: X-RAY OF [facility] Clinical data:Atrial fibrillation, rapid ventricular rate.  Technique:Single view of the chest. Prior studies: No prior studies submitted. Findings: The lungs are grossly clear; noevidence of acute infiltrate or pleural effusion. Median sternotomy and CABG. Cardiac silhouette is within normal limits. Moderate eventration right hemidiaphragm. No acute osseous abnormality is detected. Median sternotomy and CABG. No acute cardiopulmonary process. Recommendation: Follow up as clinically indicated.  Electronically Signed by Brandon Maynard MD at [facility]-[id number] 28:80:60 PM EST             Assessment/Plan:    New onset atrial fibrillation   - Cardiology consultation and recommendations appreciated  -Received IV bolus diltiazem and digoxin while in ER  - ECHO 10/21/2021: Normal LV with severely reduced LV function EF 25 to 30%, severe anterior and anterolateral wall hypokinesis  - Trend troponin   -TSH   -BNP  - Serial and PRN EKGs  - Monitor on telemetry     Squamous cell carcinoma of right lung / S/P lobectomy   -Courtesy consultation to cardiothoracic surgery    Smoking history    DVT prophylaxis Lovenox    Signed:  CAPO Lieberman - CNP, 5/31/2022 5:43 PM

## 2022-06-01 LAB
ANION GAP SERPL CALCULATED.3IONS-SCNC: 11 MMOL/L (ref 7–19)
BUN BLDV-MCNC: 9 MG/DL (ref 8–23)
CALCIUM SERPL-MCNC: 8.9 MG/DL (ref 8.8–10.2)
CHLORIDE BLD-SCNC: 101 MMOL/L (ref 98–111)
CO2: 26 MMOL/L (ref 22–29)
CREAT SERPL-MCNC: 0.9 MG/DL (ref 0.5–1.2)
D DIMER: 3.26 UG/ML FEU (ref 0–0.48)
EKG P AXIS: NORMAL DEGREES
EKG P-R INTERVAL: NORMAL MS
EKG Q-T INTERVAL: 298 MS
EKG Q-T INTERVAL: 356 MS
EKG Q-T INTERVAL: 420 MS
EKG QRS DURATION: 82 MS
EKG QRS DURATION: 86 MS
EKG QRS DURATION: 90 MS
EKG QTC CALCULATION (BAZETT): 427 MS
EKG QTC CALCULATION (BAZETT): 463 MS
EKG QTC CALCULATION (BAZETT): 469 MS
EKG T AXIS: 85 DEGREES
EKG T AXIS: 86 DEGREES
EKG T AXIS: 95 DEGREES
GFR AFRICAN AMERICAN: >59
GFR NON-AFRICAN AMERICAN: >60
GLUCOSE BLD-MCNC: 101 MG/DL (ref 74–109)
MAGNESIUM: 1.7 MG/DL (ref 1.6–2.4)
PHOSPHORUS: 3.7 MG/DL (ref 2.5–4.5)
POTASSIUM REFLEX MAGNESIUM: 4.4 MMOL/L (ref 3.5–5)
SODIUM BLD-SCNC: 138 MMOL/L (ref 136–145)
TROPONIN: <0.01 NG/ML (ref 0–0.03)
VITAMIN D 25-HYDROXY: 34.5 NG/ML

## 2022-06-01 PROCEDURE — 83735 ASSAY OF MAGNESIUM: CPT

## 2022-06-01 PROCEDURE — 99024 POSTOP FOLLOW-UP VISIT: CPT | Performed by: SURGERY

## 2022-06-01 PROCEDURE — 84484 ASSAY OF TROPONIN QUANT: CPT

## 2022-06-01 PROCEDURE — 6360000002 HC RX W HCPCS

## 2022-06-01 PROCEDURE — 2500000003 HC RX 250 WO HCPCS: Performed by: HOSPITALIST

## 2022-06-01 PROCEDURE — 93005 ELECTROCARDIOGRAM TRACING: CPT

## 2022-06-01 PROCEDURE — 36415 COLL VENOUS BLD VENIPUNCTURE: CPT

## 2022-06-01 PROCEDURE — 82306 VITAMIN D 25 HYDROXY: CPT

## 2022-06-01 PROCEDURE — 99223 1ST HOSP IP/OBS HIGH 75: CPT | Performed by: INTERNAL MEDICINE

## 2022-06-01 PROCEDURE — 6370000000 HC RX 637 (ALT 250 FOR IP)

## 2022-06-01 PROCEDURE — 6370000000 HC RX 637 (ALT 250 FOR IP): Performed by: INTERNAL MEDICINE

## 2022-06-01 PROCEDURE — 93010 ELECTROCARDIOGRAM REPORT: CPT | Performed by: INTERNAL MEDICINE

## 2022-06-01 PROCEDURE — 84100 ASSAY OF PHOSPHORUS: CPT

## 2022-06-01 PROCEDURE — 85379 FIBRIN DEGRADATION QUANT: CPT

## 2022-06-01 PROCEDURE — 80048 BASIC METABOLIC PNL TOTAL CA: CPT

## 2022-06-01 PROCEDURE — 2580000003 HC RX 258

## 2022-06-01 PROCEDURE — 2140000000 HC CCU INTERMEDIATE R&B

## 2022-06-01 RX ORDER — DILTIAZEM HYDROCHLORIDE 120 MG/1
120 CAPSULE, COATED, EXTENDED RELEASE ORAL DAILY
Status: DISCONTINUED | OUTPATIENT
Start: 2022-06-01 | End: 2022-06-02

## 2022-06-01 RX ORDER — METOPROLOL SUCCINATE 25 MG/1
12.5 TABLET, EXTENDED RELEASE ORAL ONCE
Status: COMPLETED | OUTPATIENT
Start: 2022-06-01 | End: 2022-06-01

## 2022-06-01 RX ORDER — METOPROLOL SUCCINATE 25 MG/1
25 TABLET, EXTENDED RELEASE ORAL 2 TIMES DAILY
Status: DISCONTINUED | OUTPATIENT
Start: 2022-06-01 | End: 2022-06-02

## 2022-06-01 RX ADMIN — ENOXAPARIN SODIUM 80 MG: 100 INJECTION SUBCUTANEOUS at 08:43

## 2022-06-01 RX ADMIN — PANTOPRAZOLE SODIUM 40 MG: 40 TABLET, DELAYED RELEASE ORAL at 05:30

## 2022-06-01 RX ADMIN — METOPROLOL SUCCINATE 12.5 MG: 25 TABLET, EXTENDED RELEASE ORAL at 08:42

## 2022-06-01 RX ADMIN — SACUBITRIL AND VALSARTAN 1 TABLET: 24; 26 TABLET, FILM COATED ORAL at 08:41

## 2022-06-01 RX ADMIN — METOPROLOL SUCCINATE 12.5 MG: 25 TABLET, EXTENDED RELEASE ORAL at 12:09

## 2022-06-01 RX ADMIN — Medication 250 MG: at 08:48

## 2022-06-01 RX ADMIN — Medication 5 MG/HR: at 04:28

## 2022-06-01 RX ADMIN — ASPIRIN 81 MG: 81 TABLET, COATED ORAL at 08:41

## 2022-06-01 RX ADMIN — SODIUM CHLORIDE, PRESERVATIVE FREE 10 ML: 5 INJECTION INTRAVENOUS at 19:48

## 2022-06-01 RX ADMIN — SACUBITRIL AND VALSARTAN 1 TABLET: 24; 26 TABLET, FILM COATED ORAL at 19:48

## 2022-06-01 RX ADMIN — DILTIAZEM HYDROCHLORIDE 120 MG: 120 CAPSULE, COATED, EXTENDED RELEASE ORAL at 12:09

## 2022-06-01 RX ADMIN — SODIUM CHLORIDE, PRESERVATIVE FREE 10 ML: 5 INJECTION INTRAVENOUS at 08:47

## 2022-06-01 RX ADMIN — METOPROLOL SUCCINATE 25 MG: 25 TABLET, EXTENDED RELEASE ORAL at 19:48

## 2022-06-01 RX ADMIN — CLOPIDOGREL BISULFATE 75 MG: 75 TABLET ORAL at 08:41

## 2022-06-01 RX ADMIN — ENOXAPARIN SODIUM 80 MG: 100 INJECTION SUBCUTANEOUS at 19:47

## 2022-06-01 ASSESSMENT — PAIN SCALES - GENERAL: PAINLEVEL_OUTOF10: 0

## 2022-06-01 ASSESSMENT — ENCOUNTER SYMPTOMS
RHINORRHEA: 0
NAUSEA: 0
SHORTNESS OF BREATH: 0
EYE REDNESS: 0
SHORTNESS OF BREATH: 1
VOMITING: 0
ABDOMINAL PAIN: 0
DIARRHEA: 0
EYE PAIN: 0
COUGH: 0
EYES NEGATIVE: 1
RESPIRATORY NEGATIVE: 1
VOICE CHANGE: 0
GASTROINTESTINAL NEGATIVE: 1

## 2022-06-01 NOTE — CONSULTS
[id number] [facility]  Cardiology Consult      Requesting MD:  Ananda Morley MD   Admit Status:         History obtained from:   [] Patient  [] Other (specify):     Patient:  Tonia Pat  [id number]     Chief Complaint:   Chief Complaint   Patient presents with    Irregular Heart Beat     sent from PCP for A-fib. Heart rate 158 in traige        HPI: Mr. Polina Trammell is a 77 y.o. male with a history of coronary artery disease status post PTCA 10/21 followed by CABG underwent recent needle biopsy demonstrating squamous cell carcinoma underwent right upper lobe lobectomy in May 2022. EKG noted to be sinus rhythm on 5/23/2022 postoperatively noted to be in atrial fibrillation/flutter on 5/31/2022. Admitted to this facility on 5/31/2022. Noted to have irregular heart rate. Per H&P noted to have distant metastatic disease on PET/CT scan. Fatigue and shortness of breath since discharge. Heart rate elevated. Review of Systems:  Review of Systems   Constitutional: Negative. Negative for chills, fever and unexpected weight change. HENT: Negative. Eyes: Negative. Respiratory: Negative. Negative for shortness of breath. Cardiovascular: Negative. Negative for chest pain. Gastrointestinal: Negative. Negative for diarrhea, nausea and vomiting. Endocrine: Negative. Genitourinary: Negative. Musculoskeletal: Negative. Skin: Negative. Neurological: Negative. All other systems reviewed and are negative.       Cardiac Specific Data:  Specialty Problems        Cardiology Problems    Acute ST elevation myocardial infarction (STEMI) of anteroseptal wall (HCC)        Heart attack (HCC)        * (Principal) New onset atrial fibrillation ([address]        Essential hypertension              Past Medical History:  Past Medical History:   Diagnosis Date    Arthritis     hands    CAD (coronary artery disease)     Cancer ([address]     Septum     CHF (congestive heart failure) ([address]     sees dr. Tammi Lang  COPD [facility]chronic obstructive [facility] disease[facility]     Headache     Hypertension     Lung nodule 2021    right upper lobe    MI [facility] infarction[facility])         Past Surgical History:  Past Surgical History:   Procedure Laterality Date    CARDIAC SURGERY      CORONARY ARTERY BYPASS GRAFT  10/2021    CT NEEDLE BIOPSY LUNG PERCUTANEOUS  2022    CT NEEDLE BIOPSY LUNG PERCUTANEOUS 2022 [facility] CT SCAN    LUNG REMOVAL, TOTAL Right 2022    ROBOTIC RIGHT UPPER LOBECTOMY performed by [facility] NOSE SURGERY  2016    SKIN BIOPSY      nose       Past Family History:  Family History   Problem Relation Age of Onset    Cancer Mother     Coronary Art Dis Father     Coronary Art Dis Brother        Past Social History:  Social History     Socioeconomic History    Marital status:      Spouse name: Not on file    Number of children: Not on file    Years of education: Not on file    Highest education level: Not on file   Occupational History    Not on file   Tobacco Use    Smoking status: Former Smoker     Packs/day: 2.00     Years: 51.00     Pack years: 102.00     Types: Cigarettes     Start date: 1970     Quit date: 10/27/2021     Years since quittin.5    Smokeless tobacco: Former User   Vaping Use    Vaping Use: Never used   Substance and Sexual Activity    Alcohol use: No    Drug use: No    Sexual activity: Not on file   Other Topics Concern    Not on file   Social History Narrative    Not on file     Social Determinants of Health     Financial Resource Strain: Low Risk     Difficulty of Paying Living Expenses: Not hard at all   Food Insecurity: No Food Insecurity    Worried About Running Out of Food in the Last Year: Never true    Alvarez of Food in the Last Year: Never true   Transportation Needs:     Lack of Transportation (Medical): Not on file    Lack of Transportation (Non-Medical):  Not on file   Physical Activity:     Days of Exercise per Week: Not on file    Minutes of Exercise per Session: Not on file   Stress:     Feeling of Stress : Not on file   Social Connections:     Frequency of Communication with Friends and Family: Not on file    Frequency of Social Gatherings with Friends and Family: Not on file    Attends Catholic Services: Not on file    Active Member of [address] [place] [address] [employer] or Organizations: Not on file    Attends Club or Organization Meetings: Not on file    Marital Status: Not on file   Intimate Partner Violence:     Fear of Current or Ex-Partner: Not on file    Emotionally Abused: Not on file    Physically Abused: Not on file    Sexually Abused: Not on file   Housing Stability:     Unable to Pay for Housing in the Last Year: Not on file    Number of [place] in the Last Year: Not on file    Unstable Housing in the Last Year: Not on file       Allergies:  No Known Allergies    Home Meds:  Prior to Admission medications    Medication Sig Start Date End Date Taking? Authorizing Provider   oxyCODONE-acetaminophen (PERCOCET)  MG per tablet Take 0.5 tablets by mouth as needed for Pain. Half tab  For pain from surgery   Yes Historical Provider, MD   atorvastatin (LIPITOR) 40 MG tablet Take 40 mg by mouth nightly   Yes Historical Provider, MD   clonazePAM (KLONOPIN) 1 MG tablet Take 1 tablet by mouth 3 times daily as needed for Anxiety for up to 30 days. Patient taking differently: Take 1 mg by mouth nightly.   5/31/22 6/30/22  CAPO Ocasio   butalbital-acetaminophen-caffeine (FIORICET, ESGIC) -80 MG per tablet Take 1 tablet by mouth every 6 hours as needed for Headaches 5/31/22   CAPO Ocasio   Metoprolol Succinate 25 MG CS24 Take 12.5 mg by mouth daily Pt is taking 1/2 pill only 5/25/22   CAPO Spicer   sacubitril-valsartan (ENTRESTO) 24-26 MG per tablet Take 1 tablet by mouth 2 times daily Pt did not have bottle to verify dose  Patient taking differently: Take 1 tablet by mouth 2 times daily  5/25/22   Sersidra Shell, APRN   furosemide (LASIX) 40 MG tablet Take 1 tablet by mouth daily 5/25/22   Serge Shell, APRN   spironolactone (ALDACTONE) 25 MG tablet Take 1 tablet by mouth daily 5/25/22   Sersidra Shell, APRN   melatonin 3 mg TABS tablet Take 1 tablet by mouth nightly as needed (sleep) 5/25/22   Serge Shell, APRN   clopidogrel (PLAVIX) 75 MG tablet Take 1 tablet by mouth daily 5/25/22   Serge Shell, APRN   aspirin 81 MG EC tablet Take 1 tablet by mouth daily 5/25/22   Serge Shell, APRN   magnesium gluconate (MAGONATE) 500 MG tablet Take 0.5 tablets by mouth daily  Patient taking differently: Take 250 mg by mouth daily Half tab (250mg) 5/25/22   Brady Carter, APRN   esomeprazole ([address]) 40 MG delayed release capsule Take 1 capsule by mouth daily 11/8/21   [facility]   [facility]. Devices (ROLLATOR ULTRA-LIGHT) MISC Use to walk 10/29/21   [facility] APRSAJAN       Current Meds:   dilTIAZem  120 mg Oral Daily    metoprolol succinate  25 mg Oral BID    sodium chloride flush  5-40 mL IntraVENous 2 times per day    enoxaparin  1 mg/kg SubCUTAneous BID    aspirin  81 mg Oral Daily    clopidogrel  75 mg Oral Daily    pantoprazole  40 mg Oral QAM AC    Magnesium Gluconate  250 mg Oral Daily    sacubitril-valsartan  1 tablet Oral BID       Current Infused Meds:   phenylephrine (AMAN-SYNEPHRINE) 50mg/250mL infusion Stopped (05/31/22 1716)    dilTIAZem Stopped (06/01/22 0525)    sodium chloride         Physical Exam:  Vitals:    06/01/22 1016   BP: 102/72   Pulse: 90   Resp: 20   Temp: 97.6 °F (36.4 °C)   SpO2: 97%       Intake/Output Summary (Last 24 hours) at 6/1/2022 1333  Last data filed at 6/1/2022 4185  Gross per 24 hour   Intake 71.03 ml   Output 950 ml   Net -878.97 ml     Estimated body mass index is 24.13 kg/m² as calculated from the following:    Height as of this encounter: 5' 10\" (1.778 m).     Weight as of this encounter: 168 lb 3.2 oz (76.3 kg).        Physical Exam  Vitals reviewed. Constitutional:       General: He is not in acute distress. Appearance: He is well-developed. He is obese. He is not ill-appearing, toxic-appearing or diaphoretic. HENT:      Head: Normocephalic and atraumatic. Nose: Nose normal.      Mouth/Throat:      Mouth: Mucous membranes are moist.      Pharynx: Oropharynx is clear. Eyes:      General: No scleral icterus. Extraocular Movements: Extraocular movements intact. Pupils: Pupils are equal, round, and reactive to light. Neck:      Vascular: No carotid bruit or JVD. Cardiovascular:      Rate and Rhythm: Normal rate and regular rhythm. Heart sounds: Normal heart sounds. No murmur heard. No friction rub. No gallop. Pulmonary:      Effort: Pulmonary effort is normal. No respiratory distress. Breath sounds: Normal breath sounds. No stridor. No wheezing, rhonchi or rales. Chest:      Chest wall: No tenderness. Abdominal:      General: Abdomen is flat. Bowel sounds are normal. There is no distension. Palpations: Abdomen is soft. There is no mass. Tenderness: There is no abdominal tenderness. There is no right CVA tenderness, left CVA tenderness, guarding or rebound. Hernia: No hernia is present. Musculoskeletal:         General: No swelling, tenderness, deformity or signs of injury. Cervical back: Normal range of motion and neck supple. No rigidity or tenderness. Right lower leg: No edema. Left lower leg: No edema. Lymphadenopathy:      Cervical: No cervical adenopathy. Skin:     General: Skin is warm and dry. Neurological:      General: No focal deficit present. Mental Status: He is alert and oriented to person, place, and time. Mental status is at baseline. Cranial Nerves: No cranial nerve deficit. Sensory: No sensory deficit. Motor: No weakness.       Coordination: Coordination normal.   Psychiatric:         Mood and Affect: Mood normal.         Behavior: Behavior normal.         Thought Content: Thought content normal.         Judgment: Judgment normal.         Labs:  Recent Labs     05/31/22  1511   WBC 8.6   HGB 12.5*          Recent Labs     05/31/22  1511 06/01/22  0632 06/01/22  1031    138  --    K 4.1 4.4  --    CL 99 101  --    CO2 27 26  --    BUN 12 9  --    CREATININE 1.0 0.9  --    LABGLOM >60 >60  --    MG  --   --  1.7   CALCIUM 9.4 8.9  --    PHOS  --   --  3.7       CK, CKMB, Troponin: @LABRCNT (CKTOTAL:3, CKMB:3, TROPONINI:3)@    Last 3 BNP:  No results for input(s): BNP in the last 72 hours. IMAGING:  XR CHEST (2 VW)    Result Date: 5/25/2022  EXAMINATION: Chest 2 views 5/25/2022 HISTORY: Post chest tube removal FINDINGS: Today's exam is compared to previous study of earlier the same day. A right-sided thoracostomy tube has been removed with no appreciable pneumothorax. There is volume loss within the right lung status post thoracotomy. Left lung is fully expanded and clear. 1.. Right-sided thoracostomy tube removed without complications. There is no pneumothorax. 2. Left lung remains clear. Signed by Dr Fidel Plascencia    XR CHEST (2 VW)    Result Date: 5/19/2022  XR CHEST (2 VW) 5/19/2022 10:43 AM History: Preop thoracic surgery for resection of the upper right lung. Neoplastic lung nodule. Two-view chest x-ray. CABG changes. Heart size is within normal limits. The mediastinum has a normal appearance. The lungs are mildly hyperexpanded with no pneumonia or pneumothorax. There is mild chronic interstitial disease with scattered calcified granulomas. There is no significant pleural fluid. No congestive failure changes. Unchanged 2 cm nodule within the base of the right upper lobe. 1. Known right lung nodule. 2. Mild chronic lung changes with hyperexpansion. 3. No acute infiltrate.  Signed by Dr Salima Irene    XR CHEST PORTABLE    Result Date: 5/31/2022  NO PRIOR REPORT AVAILABLE Exam: X-RAY OF [facility] Clinical data:Atrial fibrillation, rapid ventricular rate. Technique:Single view of the chest. Prior studies: No prior studies submitted. Findings: The lungs are grossly clear; noevidence of acute infiltrate or pleural effusion. Median sternotomy and CABG. Cardiac silhouette is within normal limits. Moderate eventration right hemidiaphragm. No acute osseous abnormality is detected. Median sternotomy and CABG. No acute cardiopulmonary process. Recommendation: Follow up as clinically indicated. Electronically Signed by Clare Wiseman MD at [id number] 44:51:00 PM EST             XR CHEST PORTABLE    Result Date: 5/25/2022  Examination. XR CHEST PORTABLE 5/25/2022 4:41 AM History: Postop thoracic surgery. A frontal portable upright view of the chest is compared with the previous study dated 5/24/2022. There is loss of right lung volume which appears more pronounced in the previous study. There are atelectatic changes in the right lung which are more advanced since the previous study. The elevation right diaphragm persists. Previously seen right apical pneumothorax is not visualized in this study. The left lung is unremarkable except for minimal scarring and atelectasis. The cardiomediastinal structures and distal trachea are displaced towards the right. There is moderate fullness of the right hilum which may represent an area of consolidation, infiltrate or a mass. No change. There is evidence of previous cardiac surgery. There is a persistent soft tissue air along the lower right lateral chest wall. No acute bony abnormality. 1. Loss of right lung volume appears more progressive since the previous study. 2. Right apical pneumothorax is not seen in the present study.  Signed by Dr Charla Lee    Result Date: 5/24/2022  EXAMINATION: Chest one view 5/24/2022 HISTORY: Chest tube placed to waterseal FINDINGS: There is volume loss on the right. A right-sided thoracostomy tube is in place. There is a tiny right apical pneumothorax. Left lung is clear except for mild left basilar atelectasis. The thoracic aorta and great vessels are ectatic. 1.. Tiny right apical pneumothorax. There is volume loss on the right post thoracotomy. A right-sided thoracostomy tube remains in place. Signed by Dr Kelli Baxter    Result Date: 5/24/2022  Examination. XR CHEST PORTABLE 5/24/2022 4:37 AM History: Postop thoracic surgery. A frontal portable upright view of the chest is compared with the previous study dated 5/23/2022. The lungs are poorly expanded. There is moderate elevation right diaphragm similar to the previous study. There is no evidence of recent infiltrate, pleural effusion, pulmonary congestion or pneumothorax. The heart size is not evaluated. There is evidence of previous cardiac surgery. The previously seen endotracheal tube have been removed in the interval. No acute bony abnormality. There is soft tissue air along the right lateral chest wall which was not noted in the previous study. 1. Poor lung expansion but no active cardiopulmonary disease. 2. Soft tissue air along the right lateral chest wall was not noted in the previous study. This may represent postsurgical changes. Clinical correlation may be obtained. Signed by Dr Valente Kelley    Result Date: 5/23/2022  XR CHEST PORTABLE 5/23/2022 12:44 PM History: Postop thoracotomy. Portable chest x-ray. Comparison is made with May 19, 2022. Endotracheal tube present. Endotracheal tube tip lies approximately 20 mm above the madelin. There is also selective right lower lobe endobronchial intubation. Right upper lobe atelectasis noted. The left lung is fully expanded and clear. The right lower lung is normally expanded. CABG changes noted. 1. Intubation.  Other than mild right upper lobe atelectasis the lungs are clear. Signed by Dr Amador Bearden      Assessment:  1. Admission 5/31/2022 irregular heart rate  2. History of tobacco usage  3. Squamous cell carcinoma right lung  4. Status post right upper lobe lobectomy 5/23/2022  5. History of non-STEMI  6. Coronary artery disease  7. Pulmonary nodule  8. Prior CABG  9. Anxiety disorder  10. Hypertension  11. Chronic headaches  12. COPD  15. CT needle biopsy 4/29/2022  14. Echocardiogram 10/21/2021 ejection fraction 25 to 30% severe anterior and anterolateral wall hypokinesis  15. Cardiac cath 10/21/2021 moderately severely impaired LV systolic function severe multivessel coronary disease PTCA proximal LAD 2.0 mm balloon CABG recommended  16. Status post CABG 10/2021 x 5 with LIMA graft      Recommendations:  1. Eliquis 5 mg p.o. twice daily if not contraindicated per primary service  2. Adjust  Toprol-XL 25 mg p.o. twice daily  3. Add Cardizem  mg p.o. daily  4.  Options for CURTIS cardiac cardioversion now discussed with patient versus anticoagulation and consideration for staged cardioversion in 2 to 3 weeks

## 2022-06-01 NOTE — PROGRESS NOTES
4 Eyes Skin Assessment    Lucas Amaya is being assessed upon: Admission    I agree that I, Jelani Krause RN, along with Nisa Molina (either 2 RN's or 1 LPN and 1 RN) have performed a thorough Head to Toe Skin Assessment on the patient. ALL assessment sites listed below have been assessed. Areas assessed by both nurses:     [x]   Head, Face, and Ears   [x]   Shoulders, Back, and Chest  [x]   Arms, Elbows, and Hands   [x]   Coccyx, Sacrum, and Ischium  [x]   Legs, Feet, and Heels    Does the Patient have Skin Breakdown?  No    Ryan Prevention initiated: Yes  Wound Care Orders initiated: No    WO nurse consulted for Pressure Injury (Stage 3,4, Unstageable, DTI, NWPT, and Complex wounds) and New or Established Ostomies: No        Primary Nurse eSignature: Jelani Krause RN on 5/31/2022 at 10:05 PM      Co-Signer eSignature: Electronically signed by Ellie Call RN on 5/31/22 at 10:06 PM CDT

## 2022-06-01 NOTE — CARE COORDINATION
HH referral received. Spoke with patient regarding [facility] services. Patient states that he is not interested in [facility], that he has good support system at home.   No further HH interventions identified   Electronically signed by John Paul Aguilar on 6/1/22 at 10:16 AM CDT

## 2022-06-01 NOTE — CONSULTS
Consultation History & Physical    Date of Admission:  5/31/2022  3:11 PM  Date of Consultation:  6/1/2022    Surgeon:    Karrie Castillo  Cardiologist:     PCP:  CAPO Workman   Other:       Chief Complaint: SOB    History of Present Illness:    .  Alison Adamson is a 77 y.o. male who was found to have a RUL mass after undergoing CABG 6 months ago. The lesion was found to be PET+ and biopsy confirmed NSCC. He underwent robotic assisted, minimally invasive RULobectomy 1 week ago and was discharged on POD2. He presents now with SOB and was found to have afib in the ED. Thoracic surgery consulted to help in the management. Past Medical History:     has a past medical history of Arthritis, CAD (coronary artery disease), Cancer ([facility] [address] CHF (congestive heart failure) ([facility] [address] COPD (chronic obstructive pulmonary disease) ([facility] [address] Headache, Hypertension, Lung nodule, and MI (myocardial infarction) ([facility] [address] Past Surgical History:     has a past surgical history that includes Nose surgery (09/2016); Coronary artery bypass graft (10/2021); skin biopsy; CT NEEDLE BIOPSY LUNG PERCUTANEOUS (4/29/2022); Cardiac surgery; and Lung removal, total (Right, 5/23/2022). Home Medications:   Prior to Admission medications    Medication Sig Start Date End Date Taking? Authorizing Provider   oxyCODONE-acetaminophen (PERCOCET)  MG per tablet Take 0.5 tablets by mouth as needed for Pain. Half tab  For pain from surgery   Yes Historical Provider, MD   atorvastatin (LIPITOR) 40 MG tablet Take 40 mg by mouth nightly   Yes Historical Provider, MD   clonazePAM (KLONOPIN) 1 MG tablet Take 1 tablet by mouth 3 times daily as needed for Anxiety for up to 30 days. Patient taking differently: Take 1 mg by mouth nightly.   5/31/22 6/30/22  CAPO Carmichael   butalbital-acetaminophen-caffeine (FIORICET, ESGIC) -58 MG per tablet Take 1 tablet by mouth every 6 hours as needed for Headaches 5/31/22   CAPO Workman Metoprolol Succinate 25 MG CS24 Take 12.5 mg by mouth daily Pt is taking 1/2 pill only 5/25/22   CAPO Hay   sacubitril-valsartan (ENTRESTO) 24-26 MG per tablet Take 1 tablet by mouth 2 times daily Pt did not have bottle to verify dose  Patient taking differently: Take 1 tablet by mouth 2 times daily  5/25/22   CAPO Hay   furosemide (LASIX) 40 MG tablet Take 1 tablet by mouth daily 5/25/22   Bertha CAPO Sher   spironolactone (ALDACTONE) 25 MG tablet Take 1 tablet by mouth daily 5/25/22   Bertha CAPO Sher   melatonin 3 mg TABS tablet Take 1 tablet by mouth nightly as needed (sleep) 5/25/22   CAPO Hay   clopidogrel (PLAVIX) 75 MG tablet Take 1 tablet by mouth daily 5/25/22   Bertha CAPO Sher   aspirin 81 MG EC tablet Take 1 tablet by mouth daily 5/25/22   Bertha CAPO Sher   magnesium gluconate (MAGONATE) 500 MG tablet Take 0.5 tablets by mouth daily  Patient taking differently: Take 250 mg by mouth daily Half tab (250mg) 5/25/22   Bertha CAPO Sher   esomeprazole ([address]) 40 MG delayed release capsule Take 1 capsule by mouth daily 11/8/21   [facility]   [facility] (ROLLATOR ULTRA-LIGHT) MISC Use to walk 10/29/21   [place] Batty DerasCAPO        Facility Administered Medications:    sodium chloride flush  5-40 mL IntraVENous 2 times per day    enoxaparin  1 mg/kg SubCUTAneous BID    aspirin  81 mg Oral Daily    clopidogrel  75 mg Oral Daily    pantoprazole  40 mg Oral QAM AC    metoprolol succinate  12.5 mg Oral Daily    Magnesium Gluconate  250 mg Oral Daily    sacubitril-valsartan  1 tablet Oral BID       Allergies:  Patient has no known allergies.      Social History:       Social History     Socioeconomic History    Marital status:      Spouse name: Not on file    Number of children: Not on file    Years of education: Not on file    Highest education level: Not on file   Occupational History    Not on file   Tobacco Use  Smoking status: Former Smoker     Packs/day: 2.00     Years: 51.00     Pack years: 102.00     Types: Cigarettes     Start date: 1970     Quit date: 10/27/2021     Years since quittin.5    Smokeless tobacco: Former User   Vaping Use    Vaping Use: Never used   Substance and Sexual Activity    Alcohol use: No    Drug use: No    Sexual activity: Not on file   Other Topics Concern    Not on file   Social History Narrative    Not on file     Social Determinants of Health     Financial Resource Strain: Low Risk     Difficulty of Paying Living Expenses: Not hard at all   Food Insecurity: No Food Insecurity    Worried About [address] in the Last Year: Never true    [address]  [email or web address] in the Last Year: Never true   Transportation Needs:     Lack of Transportation (Medical): Not on file    Lack of Transportation (Non-Medical):  Not on file   Physical Activity:     Days of Exercise per Week: Not on file    Minutes of Exercise per Session: Not on file   Stress:     Feeling of Stress : Not on file   Social Connections:     Frequency of Communication with Friends and Family: Not on file    Frequency of Social Gatherings with Friends and Family: Not on file    Attends Muslim Services: Not on file    Active Member of [address] [place] [address] or Organizations: Not on file    Attends Club or Organization Meetings: Not on file    Marital Status: Not on file   Intimate Partner Violence:     Fear of Current or Ex-Partner: Not on file    Emotionally Abused: Not on file    Physically Abused: Not on file    Sexually Abused: Not on file   Housing Stability:     Unable to Pay for Housing in the Last Year: Not on file    Number of [place] in the Last Year: Not on file    Unstable Housing in the Last Year: Not on file       Family History:     Family History   Problem Relation Age of Onset    Cancer Mother     Coronary Art Dis Father     Coronary Art Dis Brother          Review of Systems: unremarkable      Physical Examination:    /66   Pulse 85   Temp 97.8 °F (36.6 °C) (Temporal)   Resp 18   Ht 5' 10\" (1.778 m)   Wt 168 lb 3.2 oz (76.3 kg)   SpO2 95%   BMI 24.13 kg/m²                     General appearance: The patient is in no acute distress, no SOB at rest, HR 85  Eyes: PERRLA  ENMT: Clear. Throat, no neck masses are palpable  Neck: no JVD, no lymphadenopathy. Respiratory: equal bilaterally, no rales  Cardiovascular: regular, no murmur. No carotid bruits. No edema or varicosities. Pulses: strong  GI: abdomen soft, nondistended, no organomegaly. Musculoskeletal: The patient is strong in a 4  Extremities cool and pale  Skin: no dermatitis or ulceration  Neuro/psychiatric: nonfocal exam    MEDICAL DECISION MAKING/TESTING      CXR: clear, no pleural effusion or ptx    EKG: afib    Labs:   CBC:   Recent Labs     05/31/22  1511   WBC 8.6   HGB 12.5*   HCT 38.3*   MCV 92.5        BMP:   Recent Labs     05/31/22  1511      K 4.1   CL 99   CO2 27   BUN 12   CREATININE 1.0     Cardiac Enzymes:   Recent Labs     05/31/22  1511 05/31/22  2253   TROPONINI <0.01 <0.01     PT/INR:   Recent Labs     05/31/22  1511   PROTIME 13.2   INR 1.01     APTT:   Recent Labs     05/31/22  1511   APTT 26.5     Liver Profile:  Lab Results   Component Value Date    AST 26 05/31/2022    ALT 29 05/31/2022    BILITOT 0.3 05/31/2022    ALKPHOS 65 05/31/2022     Lab Results   Component Value Date    CHOL 200 10/21/2021    HDL 43 10/21/2021    TRIG 49 10/21/2021     TSH:  No results found for: TSH  UA:   Lab Results   Component Value Date    COLORU YELLOW 05/31/2022    PHUR 7.0 05/31/2022    CLARITYU Clear 05/31/2022    SPECGRAV 1.009 05/31/2022    LEUKOCYTESUR Negative 05/31/2022    UROBILINOGEN 1.0 05/31/2022    BILIRUBINUR Negative 05/31/2022    BLOODU Negative 05/31/2022    GLUCOSEU Negative 05/31/2022          Diagnosis:  Postoperative afib that has now reverted to NSR after medical management. Given a normal chest exam, good O2 sat in room air and clear CXR, I suspect that his SOB was not from a pulmonary issue. He has a BNP>7000, suggesting that it is a cardiac cause - most likely afib. Plan: consider amiodarone loading and then he should be stable for discharge if he remains in NSR. F/u with [place] next Wed in the office.     MD Brittney Hughes MD  6/1/2022  6:16 AM

## 2022-06-01 NOTE — ED PROVIDER NOTES
[address]      Pt Name: Tish Rinne  MRN: [id number]  [place] 1955  Date of evaluation: 5/31/2022  Provider: Lily Steward MD    CHIEF COMPLAINT       Chief Complaint   Patient presents with    Irregular Heart Beat     sent from PCP for A-fib. Heart rate 158 in traige          HISTORY OF PRESENT ILLNESS   (Location/Symptom, Timing/Onset,Context/Setting, Quality, Duration, Modifying Factors, Severity)  Note limiting factors. Tish Rinne is a 77 y.o. male who presents to the emergency department after he was noted to have atrial fibrillation with RVR on an outpatient follow-up appointment with PCP today. Patient was discharged from the hospital few days ago after having thoracotomy for lobectomy for lung mass. Patient denies any acute shortness of breath, chest pain, palpitations. States he has had some shortness of breath and fatigue since the surgery but otherwise did not feel any different or worse since being discharged from the hospital.  No history of atrial fibrillation in the past.    HPI    NursingNotes were reviewed. REVIEW OF SYSTEMS    (2-9 systems for level 4, 10 or more for level 5)     Review of Systems   Constitutional: Negative for fatigue and fever. HENT: Negative for congestion, rhinorrhea and voice change. Eyes: Negative for pain and redness. Respiratory: Positive for shortness of breath. Negative for cough. Cardiovascular: Negative for chest pain. Gastrointestinal: Negative for abdominal pain, diarrhea and vomiting. Endocrine: Negative. Genitourinary: Negative. Musculoskeletal: Negative for arthralgias and gait problem. Skin: Negative for rash and wound. Neurological: Negative for weakness and headaches. Hematological: Negative. Psychiatric/Behavioral: Negative. All other systems reviewed and are negative. A complete review of systems was performed and is negative except as noted above in the HPI. PAST MEDICAL HISTORY     Past Medical History:   Diagnosis Date    Arthritis     hands    CAD (coronary artery disease)     Cancer ([facility] [address]     Septum     CHF (congestive heart failure) ([facility] [address]     sees dr. Jean Paul Segal COPD (chronic obstructive pulmonary disease) ([facility] [address]     Headache     Hypertension     Lung nodule 12/2021    right upper lobe    MI (myocardial infarction[facility])          SURGICAL HISTORY       Past Surgical History:   Procedure Laterality Date    CARDIAC SURGERY      CORONARY ARTERY BYPASS GRAFT  10/2021    CT NEEDLE BIOPSY LUNG PERCUTANEOUS  4/29/2022    CT NEEDLE BIOPSY LUNG PERCUTANEOUS 4/29/2022 [facility] CT SCAN    LUNG REMOVAL, TOTAL Right 5/23/2022    ROBOTIC RIGHT UPPER LOBECTOMY performed by Navjot Bingham MD at [address] [place] [address]  09/2016    SKIN BIOPSY      nose         CURRENT MEDICATIONS       Current Discharge Medication List      CONTINUE these medications which have NOT CHANGED    Details   oxyCODONE-acetaminophen (PERCOCET)  MG per tablet Take 0.5 tablets by mouth as needed for Pain. Half tab  For pain from surgery      atorvastatin (LIPITOR) 40 MG tablet Take 40 mg by mouth nightly      clonazePAM (KLONOPIN) 1 MG tablet Take 1 tablet by mouth 3 times daily as needed for Anxiety for up to 30 days.   Qty: 90 tablet, Refills: 0    Associated Diagnoses: Primary insomnia; ABHINAV (generalized anxiety disorder)      butalbital-acetaminophen-caffeine (FIORICET, ESGIC) -40 MG per tablet Take 1 tablet by mouth every 6 hours as needed for Headaches  Qty: 120 tablet, Refills: 0    Associated Diagnoses: Chronic tension-type headache, not intractable      Metoprolol Succinate 25 MG CS24 Take 12.5 mg by mouth daily Pt is taking 1/2 pill only  Qty: 30 capsule, Refills: 5      sacubitril-valsartan (ENTRESTO) 24-26 MG per tablet Take 1 tablet by mouth 2 times daily Pt did not have bottle to verify dose  Qty: 60 tablet, Refills: 5      furosemide (LASIX) 40 MG tablet Take 1 tablet by mouth daily  Qty: 30 tablet, Refills: 3      spironolactone (ALDACTONE) 25 MG tablet Take 1 tablet by mouth daily  Qty: 30 tablet, Refills: 3      melatonin 3 mg TABS tablet Take 1 tablet by mouth nightly as needed (sleep)  Qty: 30 tablet, Refills: 3      clopidogrel (PLAVIX) 75 MG tablet Take 1 tablet by mouth daily  Qty: 30 tablet, Refills: 3      aspirin 81 MG EC tablet Take 1 tablet by mouth daily  Qty: 30 tablet, Refills: 3      magnesium gluconate (MAGONATE) 500 MG tablet Take 0.5 tablets by mouth daily      esomeprazole (NEXIUM) 40 MG delayed release capsule Take 1 capsule by mouth daily  Qty: 30 capsule, Refills: 11    Associated Diagnoses: Gastroesophageal reflux disease without esophagitis      Misc. Devices (ROLLATOR ULTRA-LIGHT) MISC Use to walk  Qty: 1 each, Refills: 0             ALLERGIES     Patient has no known allergies.     FAMILY HISTORY       Family History   Problem Relation Age of Onset    Cancer Mother     Coronary Art Dis Father     Coronary Art Dis Brother           SOCIAL HISTORY       Social History     Socioeconomic History    Marital status:      Spouse name: Not on file    Number of children: Not on file    Years of education: Not on file    Highest education level: Not on file   Occupational History    Not on file   Tobacco Use    Smoking status: Former Smoker     Packs/day: 2.00     Years: 51.00     Pack years: 102.00     Types: Cigarettes     Start date: 1970     Quit date: 10/27/2021     Years since quittin.5    Smokeless tobacco: Former User   Vaping Use    Vaping Use: Never used   Substance and Sexual Activity    Alcohol use: No    Drug use: No    Sexual activity: Not on file   Other Topics Concern    Not on file   Social History Narrative    Not on file     Social Determinants of Health     Financial Resource Strain: Low Risk     Difficulty of Paying Living Expenses: Not hard at all   Food Insecurity: No Food Insecurity    Worried About Running Out of Food in the Last Year: Never true    Ran Out of Food in the Last Year: Never true   Transportation Needs:     Lack of Transportation (Medical): Not on file    Lack of Transportation (Non-Medical): Not on file   Physical Activity:     Days of Exercise per Week: Not on file    Minutes of Exercise per Session: Not on file   Stress:     Feeling of Stress : Not on file   Social Connections:     Frequency of Communication with Friends and Family: Not on file    Frequency of Social Gatherings with Friends and Family: Not on file    Attends Sabianism Services: Not on file    Active Member of [address] [place] [address] [employer] or Organizations: Not on file    Attends Club or Organization Meetings: Not on file    Marital Status: Not on file   Intimate Partner Violence:     Fear of Current or Ex-Partner: Not on file    Emotionally Abused: Not on file    Physically Abused: Not on file    Sexually Abused: Not on file   Housing Stability:     Unable to Pay for Housing in the Last Year: Not on file    Number of [place] in the Last Year: Not on file    Unstable Housing in the Last Year: Not on file       SCREENINGS   NIH Stroke Scale  Level of Consciousness (1a): AlertGlasgow Coma Scale  Eye Opening: Spontaneous  Best Verbal Response: Oriented  Best Motor Response: Obeys commands  [place] Coma Scale Score: 15        PHYSICAL EXAM    (up to 7 for level 4, 8 or more for level 5)     ED Triage Vitals   BP Temp Temp Source Heart Rate Resp SpO2 Height Weight   05/31/22 1507 05/31/22 1507 05/31/22 1507 05/31/22 1507 05/31/22 1507 05/31/22 1507 05/31/22 1910 05/31/22 1910   120/83 98.9 °F (37.2 °C) Oral (!) 168 22 (!) 89 % 5' 10\" (1.778 m) 168 lb 3.2 oz (76.3 kg)       Physical Exam  Vitals and nursing note reviewed. Constitutional:       General: He is not in acute distress. Appearance: He is well-developed. He is not diaphoretic. HENT:      Head: Normocephalic and atraumatic. Eyes:      General: No scleral icterus.   Neck: Vascular: No JVD. Cardiovascular:      Rate and Rhythm: Tachycardia present. Rhythm irregular. Pulses:           Radial pulses are 2+ on the right side and 2+ on the left side. Dorsalis pedis pulses are 2+ on the right side and 2+ on the left side. Heart sounds: Normal heart sounds. No murmur heard. No friction rub. No gallop. Pulmonary:      Effort: Pulmonary effort is normal. No accessory muscle usage or respiratory distress. Breath sounds: Normal breath sounds. No stridor. No decreased breath sounds, wheezing, rhonchi or rales. Chest:      Chest wall: No tenderness. Abdominal:      General: There is no distension. Palpations: Abdomen is soft. Tenderness: There is no abdominal tenderness. There is no guarding or rebound. Musculoskeletal:         General: No deformity. Normal range of motion. Right lower leg: No edema. Left lower leg: No edema. Skin:     General: Skin is warm and dry. Coloration: Skin is not pale. Findings: No erythema. Neurological:      Mental Status: He is alert and oriented to person, place, and time. GCS: GCS eye subscore is 4. GCS verbal subscore is 5. GCS motor subscore is 6. Cranial Nerves: No cranial nerve deficit. Motor: No abnormal muscle tone.       Coordination: Coordination normal.   Psychiatric:         Behavior: Behavior normal.         Judgment: Judgment normal.         DIAGNOSTIC RESULTS     EKG: All EKG's are interpreted by the Emergency Department Physician who either signs or Co-signs this chart in the absence of a cardiologist.        RADIOLOGY:   Non-plain film images such as CT, Ultrasound and MRI are read by the radiologist. [facility] images are visualized and preliminarily interpreted by the emergency physician with the below findings:        Interpretation per the Radiologist below, if available at the time of this note:    XR CHEST PORTABLE   Final Result   Median sternotomy and CABG.  No acute cardiopulmonary process. Recommendation: Follow up as clinically indicated. Electronically Signed by Jaxson Ruiz MD at [facility] 32:43:79 PM EST                     ED BEDSIDE ULTRASOUND:   Performed by ED Physician - none    LABS:  Labs Reviewed   CBC WITH AUTO DIFFERENTIAL - Abnormal; Notable for the following components:       Result Value    RBC 4.14 (*)     Hemoglobin 12.5 (*)     Hematocrit 38.3 (*)     MCHC 32.6 (*)     RDW 15.1 (*)     Eosinophils % 6.3 (*)     Basophils % 1.2 (*)     All other components within normal limits   COMPREHENSIVE METABOLIC PANEL W/ REFLEX TO MG FOR LOW K - Abnormal; Notable for the following components:    Glucose 134 (*)     All other components within normal limits   BRAIN NATRIURETIC PEPTIDE - Abnormal; Notable for the following components:    Pro-BNP 7,323 (*)     All other components within normal limits   COVID-19, RAPID   TROPONIN   PROTIME-INR   APTT   URINALYSIS WITH REFLEX TO CULTURE   TROPONIN   TSH WITH REFLEX TO FT4   BASIC METABOLIC PANEL W/ REFLEX TO MG FOR LOW K   TROPONIN       All other labs were within normal range or not returned as of this dictation.     Medications   phenylephrine (AMAN-SYNEPHRINE) 50 mg in dextrose 5 % 250 mL infusion (0 mcg/min IntraVENous Held 5/31/22 1716)   dilTIAZem HCl in sodium chloride 125 mg / 125 mL infusion (7.5 mg/hr IntraVENous Rate/Dose Change 6/1/22 0019)   sodium chloride flush 0.9 % injection 5-40 mL (10 mLs IntraVENous Given 5/31/22 2023)   sodium chloride flush 0.9 % injection 5-40 mL (has no administration in time range)   0.9 % sodium chloride infusion (has no administration in time range)   ondansetron (ZOFRAN-ODT) disintegrating tablet 4 mg (has no administration in time range)     Or   ondansetron (ZOFRAN) injection 4 mg (has no administration in time range)   polyethylene glycol (GLYCOLAX) packet 17 g (has no administration in time range)   acetaminophen (TYLENOL) tablet 650 mg (has no administration in time range)     Or   acetaminophen (TYLENOL) suppository 650 mg (has no administration in time range)   enoxaparin (LOVENOX) injection 80 mg (80 mg SubCUTAneous Given 5/31/22 2021)   aspirin EC tablet 81 mg (81 mg Oral Given 5/31/22 2020)   clonazePAM (KLONOPIN) tablet 1 mg (has no administration in time range)   clopidogrel (PLAVIX) tablet 75 mg (75 mg Oral Given 5/31/22 2023)   pantoprazole (PROTONIX) tablet 40 mg (has no administration in time range)   melatonin tablet 3 mg (3 mg Oral Given 5/31/22 2113)   metoprolol succinate (TOPROL XL) extended release tablet 12.5 mg (12.5 mg Oral Given 5/31/22 2021)   Magnesium Gluconate tablet 250 mg (250 mg Oral Not Given 5/31/22 2101)   sacubitril-valsartan (ENTRESTO) 24-26 MG per tablet 1 tablet (1 tablet Oral Given 5/31/22 2022)   dilTIAZem injection 12.5 mg (12.5 mg IntraVENous Given 5/31/22 1522)   digoxin (LANOXIN) injection 250 mcg (250 mcg IntraVENous Given 5/31/22 1613)       EMERGENCY DEPARTMENT COURSE and DIFFERENTIALDIAGNOSIS/MDM:   Vitals:    Vitals:    05/31/22 1910 05/31/22 2021 05/31/22 2045 05/31/22 2358   BP: 113/84 115/87 120/73 120/73   Pulse: 77 (!) 115  (!) 115   Resp: 20 20  20   Temp: 97.7 °F (36.5 °C) 97.8 °F (36.6 °C)  97.8 °F (36.6 °C)   TempSrc: Temporal Temporal     SpO2: 97%   97%   Weight: 168 lb 3.2 oz (76.3 kg)      Height: 5' 10\" (1.778 m)          MDM    ED Course as of 06/01/22 0031   Tue May 31, 2022   1510 KG shows atrial fibrillation with ventricular rate of 160. Likely rate related ischemic changes. Normal QRS and QTc intervals. [CYNTHIA]   2626 After diltiazem, heart rate improved for a little while but then went back up as high as the 170s. Blood pressure dropped to the 70U systolic at 1 point. Digoxin was ordered at that time. Since it was given, heart rate has come down to the 80s with blood pressure 101/69 at this time. Phenylephrine had been ordered but has not yet been initiated.  [CYNTHIA]   1725 Lonny EKG at this time shows atrial flutter with a 4 1 block with ventricular rate of 85. No findings of acute ischemia or infarction. Normal intervals. [CYNTHIA]      ED Course User Index  [CYNTHIA] Isabel Pham MD     Based on the evaluation and work-up here patient is felt to require further monitoring, work-up, or treatment that is available in the emergency department. Case was discussed with hospitalist who agrees for observation or admission for further management. Treatment and stabilization as necessary were provided in the emergency department prior to transfer of care to the medicine service. CONSULTS:  IP CONSULT TO CARDIOLOGY  IP CONSULT TO CARDIOTHORACIC SURGERY    PROCEDURES:  Unless otherwise notedbelow, none     Procedures    CRITICAL CARE TIME   Total Critical Care time was 30 minutes, excluding separately reportable procedures. There was a high probability of clinically significant/life threatening deterioration in the patient's condition which required my urgent intervention. FINAL IMPRESSION     1. Atrial fibrillation with RVR ([address]    2. New onset atrial fibrillation [facility])          DISPOSITION/PLAN   DISPOSITION Admitted 05/31/2022 05:42:53 PM      No notes of EC Admission Criteria type on file. PATIENT REFERRED TO:  No follow-up provider specified.     DISCHARGE MEDICATIONS:  Current Discharge Medication List             (Please note that portions of this note were completed with a voice recognition program.  Efforts were made to edit the dictations butoccasionally words are mis-transcribed.)    Isabel Christian MD (electronically signed)  AttendingEmergency Physician          Isabel Pham MD  06/01/22 9574

## 2022-06-01 NOTE — CARE COORDINATION
Date / Time of Evaluation:   6/1/2022    2:41 PM  Assessment Completed by:   Pete Suresh      Patient Name:   Savanna Pearson  MRN:   [id number]  [place]:   1955    Patient Admission Status:       Patient Contact Information:    [address] [address]  [phone] (home)   Telephone Information:   Mobile [phone]     Above information verified? [x]   Yes  []   No    (Best Practice:   Have patient/caregiver verify above address and phone number by stating out loud their current address and reachable phone number. Initial Assessment Completed at bedside with:      [x]   Patient  []   Family/Caregiver/Guardian   []   Other:      Current PCP:    CAPO Baum    PCP verified? [x]   Yes  []   No    Emergency Contacts:    Extended Emergency Contact Information  Primary Emergency Contact: Niharika Lamb  Address: [address] [place] [address] [facility] [address] [facility] Phone: [phone]  Mobile Phone: [phone]  Relation: Child  Secondary Emergency Contact: [address] [place] [address] ,[address] [place] [address] Phone: [phone]  Relation: Grandchild  Preferred language: English   needed? No    Advance Directives:    Does Mr. Dank Prieto have an advance directive in his electronic medical record? [x]   Yes  []   No    Code Status:   Full Code      Have you been vaccinated for COVID-19 (SARS-CoV-2)? [x]   Yes  []   No                   If so, when?     Which :         []   Pfizer-BioNTech  []   Moderna  []   [employer]  []   Other:       Do you have any of the following unmet social needs that would keep you from returning home safely:    []   Yes  [x]   No                    Unmet Social Needs:           []   Living Situation/Housing  []   Food  []   Stroke Education   []   Utilities  []   Personal Safety  []   Financial Strain  []   Employment  []   Mental Health  []   Substance Abuse  []   Transportation Barriers    Additional Unmet Social Needs Notes:   none identified        Financial:    Payor: John Paul Duggan / Plan: John Paul Duggan / Product Type: *No Product type* /     Pre-Cert required for SNF:     [x]   Yes  []   No    Have Long Term Care Insurance:      []   Yes  [x]   No      Pharmacy: Pt reports uses [address] as preferred    [address], [address] [phone] - F [phone]  [address] [address]  Phone: [phone] Fax: Gamal 8264, [address] [facility] [phone] Ort[place]ha Figures [phone]  [address] [address]  Phone: [phone] Fax: [phone]    Potential assistance purchasing medications? []   Yes  [x]   No      ADLS:       Support System:   Children      Current Home Environment:   resides with dtr and grandchildren    Steps:       []   Yes  [x]   No    If yes, how many?  Ramp to enter and one level inside- house is handicapped accessible for youngest grandchild    Plans to RETURN to current housing:     [x]   Yes  []   No    Barriers to RETURNING to current housing:  none identified      Currently ACTIVE with Home Health CARE:      []   Yes  [x]   No    [address]:  pt declined services states not necessary with others in the home      DME Provider:   no dme used by pt but home is handicapped accessible      Had [address] prior to admission:      []   Yes  [x]   No    Oxygen Company:   NA    Has a pulse oximetry unit at home:     []   Yes  [x]   No    Informed of need to bring portable home O2 tank to hospital on day of DISCHARGE:      []   Yes  [x]   No    Name of person committed to bringing portable tank at discharge:   NA      Active with HD/PD prior to admission:             []   Yes  [x]   No    Nephrologist:     HD Center:         Transition Plan:   return home with family    Transportation PLAN for Discharge:  family    Factors facilitating achievement of predicted outcomes: NA    Barriers to discharge:  none identified at this time      Patient Deficits:    []   Yes   [x]   No    If yes:    []   Confusion/Memory  []   Visual  []   Motor/Sensory         []   Right arm         []   Right leg         []   Left arm         []   Left leg  []   Language/Speech         []   Aphasia         []   Dysarthria         []   Swallow    NIH Stroke Scale  Level of Consciousness (1a): Alert    [place] Coma Scale  Eye Opening: Spontaneous  Best Verbal Response: Oriented  Best Motor Response: Obeys commands  T[place]mina Coma Scale Score: 15    Patient Deficit Notes:   NA        Additional CM/SW Notes: Pt lying in bed states was sent Back to [facility] by PCP at the f/u appt today when discovered new onset afib. SW following for further dc needs identified. Alvarez Montaño and/or his family were provided with choice of provider:    []   Yes   [x]   No        []   Stroke education booklet reviewed and given to patient/family/caregiver/guardian. All questions answered all questions answered appropriately and efficiently per family.       [address]  Care Management  Phone:   [phone]          Fax:   [phone]

## 2022-06-01 NOTE — PLAN OF CARE
Problem: Discharge Planning  Goal: Discharge to home or other facility with appropriate resources  Outcome: Progressing  Flowsheets  Taken 5/31/2022 2021 by Socrates Odonnell RN  Discharge to home or other facility with appropriate resources:   Identify barriers to discharge with patient and caregiver   Arrange for needed discharge resources and transportation as appropriate   Identify discharge learning needs (meds, wound care, etc)  Taken 5/31/2022 1932 by Elsie Obrien RN  Discharge to home or other facility with appropriate resources: Identify barriers to discharge with patient and caregiver     Problem: ABCDS Injury Assessment  Goal: Absence of physical injury  Outcome: Progressing     Problem: ABCDS Injury Assessment  Goal: Absence of physical injury  Outcome: Progressing     Problem: Pain  Goal: Verbalizes/displays adequate comfort level or baseline comfort level  Outcome: Progressing     Problem: Safety - Adult  Goal: Free from fall injury  Outcome: Progressing

## 2022-06-01 NOTE — PROGRESS NOTES
Philip Cheng arrived to room # [id number]. Presented with: Afib/RVR  Mental Status: Patient is oriented, alert, coherent, logical, thought processes intact and able to concentrate and follow conversation. Vitals:    05/31/22 1910   BP: 113/84   Pulse: 77   Resp: 20   Temp: 97.7 °F (36.5 °C)   SpO2: 97%     Patient safety contract and falls prevention contract reviewed with patient Yes. Oriented Patient to room. Call light within reach. Yes.   Needs, issues or concerns expressed at this time: no.      Electronically signed by Catina Rosen RN on 5/31/2022 at 8:04 PM

## 2022-06-01 NOTE — PROGRESS NOTES
Patient come to nursing care from ED with Afib /RVR . Heart Rate was 150 -170  During  nursing shift change. Patient put on Diltiazem drip . Titarated this morning @0435 to 2.5 mg/hr. Patient's heart rate is 85 at this time. Vital signs are 98/59 ,Map is 75 . Patient denies any chest pain at this time. Patient is tolerating well. Will continue to monitor the patient.

## 2022-06-01 NOTE — PROGRESS NOTES
Physician Progress Note      Nehal Olivas  CSN #:                  [id number]  :                       1955  ADMIT DATE:       2022 3:11 PM  DISCH DATE:  RESPONDING  PROVIDER #:        PATTI SHAY MD          QUERY TEXT:    Pt admitted with A Fib and has CHF documented. Pt had removal of RUL mass 1   week ago. Presents to ER from PCP office for A Fib. If possible, please   document in progress notes and discharge summary further specificity regarding   the type and acuity of CHF:    The medical record reflects the following:  Risk Factors: CAD HTN A Fib  Clinical Indicators: ECHO 10/21/21,  \"Normal left ventricular size with   severely reduced LV function and an estimated ejection fraction of   approximately 25-30%. Severe anterior and anterolateral wall hypokinesis. No   evidence of left ventricular mass or thrombus noted. Normal right ventricular   size with grossly preserved RV function. \" Pro-BNP 7.323. SOB, fatigue. CXR   Median sternotomy and CABG. No acute cardiopulmonary process. Treatment: Home medications Lasix 40 mg daily, Entresto 24-26 BID continued as   inpatient. EKG, CXR. Options provided:  -- Acute on Chronic Systolic CHF/HFrEF  -- Acute on Chronic Diastolic CHF/HFpEF  -- Acute on Chronic Systolic and Diastolic CHF  -- Chronic Systolic CHF/HFrEF  -- Chronic Diastolic CHF/HFpEF  -- Chronic Systolic and Diastolic CHF  -- Other - I will add my own diagnosis  -- Disagree - Not applicable / Not valid  -- Disagree - Clinically unable to determine / Unknown  -- Refer to Clinical Documentation Reviewer    PROVIDER RESPONSE TEXT:    This patient has chronic systolic CHF/HFrEF.     Query created by: Sybil Cevallos on 2022 11:13 AM      Electronically signed by:  Wanda Tracy MD 2022 12:48 PM

## 2022-06-02 ENCOUNTER — TELEPHONE (OUTPATIENT)
Dept: CARDIOLOGY CLINIC | Age: 67
End: 2022-06-02

## 2022-06-02 ENCOUNTER — HOSPITAL ENCOUNTER (OUTPATIENT)
Dept: INFUSION THERAPY | Age: 67
End: 2022-06-02

## 2022-06-02 VITALS
HEIGHT: 70 IN | SYSTOLIC BLOOD PRESSURE: 106 MMHG | HEART RATE: 64 BPM | RESPIRATION RATE: 18 BRPM | WEIGHT: 169.3 LBS | OXYGEN SATURATION: 97 % | DIASTOLIC BLOOD PRESSURE: 79 MMHG | BODY MASS INDEX: 24.24 KG/M2 | TEMPERATURE: 97.3 F

## 2022-06-02 DIAGNOSIS — Z98.890 STATUS POST THORACOTOMY: Primary | ICD-10-CM

## 2022-06-02 LAB
ANION GAP SERPL CALCULATED.3IONS-SCNC: 10 MMOL/L (ref 7–19)
BUN BLDV-MCNC: 12 MG/DL (ref 8–23)
CALCIUM SERPL-MCNC: 8.8 MG/DL (ref 8.8–10.2)
CHLORIDE BLD-SCNC: 103 MMOL/L (ref 98–111)
CO2: 26 MMOL/L (ref 22–29)
CREAT SERPL-MCNC: 0.9 MG/DL (ref 0.5–1.2)
EKG P AXIS: NORMAL DEGREES
EKG P-R INTERVAL: NORMAL MS
EKG Q-T INTERVAL: 418 MS
EKG QRS DURATION: 84 MS
EKG QTC CALCULATION (BAZETT): 465 MS
EKG T AXIS: 77 DEGREES
GFR AFRICAN AMERICAN: >59
GFR NON-AFRICAN AMERICAN: >60
GLUCOSE BLD-MCNC: 100 MG/DL (ref 74–109)
POTASSIUM REFLEX MAGNESIUM: 4.7 MMOL/L (ref 3.5–5)
SODIUM BLD-SCNC: 139 MMOL/L (ref 136–145)

## 2022-06-02 PROCEDURE — 93010 ELECTROCARDIOGRAM REPORT: CPT | Performed by: INTERNAL MEDICINE

## 2022-06-02 PROCEDURE — 99232 SBSQ HOSP IP/OBS MODERATE 35: CPT | Performed by: INTERNAL MEDICINE

## 2022-06-02 PROCEDURE — 93005 ELECTROCARDIOGRAM TRACING: CPT

## 2022-06-02 PROCEDURE — 2580000003 HC RX 258

## 2022-06-02 PROCEDURE — 6370000000 HC RX 637 (ALT 250 FOR IP)

## 2022-06-02 PROCEDURE — 6360000002 HC RX W HCPCS

## 2022-06-02 PROCEDURE — 6370000000 HC RX 637 (ALT 250 FOR IP): Performed by: INTERNAL MEDICINE

## 2022-06-02 PROCEDURE — 80048 BASIC METABOLIC PNL TOTAL CA: CPT

## 2022-06-02 PROCEDURE — 36415 COLL VENOUS BLD VENIPUNCTURE: CPT

## 2022-06-02 RX ORDER — METOPROLOL SUCCINATE 50 MG/1
50 TABLET, EXTENDED RELEASE ORAL EVERY MORNING
Qty: 30 TABLET | Refills: 1 | Status: SHIPPED | OUTPATIENT
Start: 2022-06-03 | End: 2022-06-02

## 2022-06-02 RX ORDER — METOPROLOL SUCCINATE 50 MG/1
50 TABLET, EXTENDED RELEASE ORAL EVERY MORNING
Status: DISCONTINUED | OUTPATIENT
Start: 2022-06-03 | End: 2022-06-02 | Stop reason: HOSPADM

## 2022-06-02 RX ORDER — METOPROLOL SUCCINATE 25 MG/1
25 TABLET, EXTENDED RELEASE ORAL NIGHTLY
Qty: 30 TABLET | Refills: 1 | Status: SHIPPED | OUTPATIENT
Start: 2022-06-02 | End: 2022-07-14

## 2022-06-02 RX ORDER — METOPROLOL SUCCINATE 25 MG/1
25 TABLET, EXTENDED RELEASE ORAL NIGHTLY
Qty: 30 TABLET | Refills: 1 | Status: SHIPPED | OUTPATIENT
Start: 2022-06-02 | End: 2022-06-02

## 2022-06-02 RX ORDER — METOPROLOL SUCCINATE 25 MG/1
25 TABLET, EXTENDED RELEASE ORAL NIGHTLY
Status: DISCONTINUED | OUTPATIENT
Start: 2022-06-02 | End: 2022-06-02 | Stop reason: HOSPADM

## 2022-06-02 RX ORDER — DILTIAZEM HYDROCHLORIDE 120 MG/1
120 CAPSULE, COATED, EXTENDED RELEASE ORAL 2 TIMES DAILY
Qty: 60 CAPSULE | Refills: 1 | Status: SHIPPED | OUTPATIENT
Start: 2022-06-02 | End: 2022-07-14

## 2022-06-02 RX ORDER — METOPROLOL SUCCINATE 25 MG/1
25 TABLET, EXTENDED RELEASE ORAL ONCE
Status: DISCONTINUED | OUTPATIENT
Start: 2022-06-02 | End: 2022-06-02 | Stop reason: HOSPADM

## 2022-06-02 RX ORDER — DILTIAZEM HYDROCHLORIDE 120 MG/1
120 CAPSULE, COATED, EXTENDED RELEASE ORAL 2 TIMES DAILY
Qty: 60 CAPSULE | Refills: 1 | Status: SHIPPED | OUTPATIENT
Start: 2022-06-02 | End: 2022-06-02

## 2022-06-02 RX ORDER — DILTIAZEM HYDROCHLORIDE 120 MG/1
120 CAPSULE, COATED, EXTENDED RELEASE ORAL 2 TIMES DAILY
Status: DISCONTINUED | OUTPATIENT
Start: 2022-06-02 | End: 2022-06-02 | Stop reason: HOSPADM

## 2022-06-02 RX ORDER — METOPROLOL SUCCINATE 50 MG/1
50 TABLET, EXTENDED RELEASE ORAL EVERY MORNING
Qty: 30 TABLET | Refills: 1 | Status: SHIPPED | OUTPATIENT
Start: 2022-06-03 | End: 2022-09-12

## 2022-06-02 RX ADMIN — Medication 250 MG: at 08:57

## 2022-06-02 RX ADMIN — SACUBITRIL AND VALSARTAN 1 TABLET: 24; 26 TABLET, FILM COATED ORAL at 08:58

## 2022-06-02 RX ADMIN — CLOPIDOGREL BISULFATE 75 MG: 75 TABLET ORAL at 08:58

## 2022-06-02 RX ADMIN — ENOXAPARIN SODIUM 80 MG: 100 INJECTION SUBCUTANEOUS at 08:57

## 2022-06-02 RX ADMIN — PANTOPRAZOLE SODIUM 40 MG: 40 TABLET, DELAYED RELEASE ORAL at 05:53

## 2022-06-02 RX ADMIN — ASPIRIN 81 MG: 81 TABLET, COATED ORAL at 08:58

## 2022-06-02 RX ADMIN — DILTIAZEM HYDROCHLORIDE 120 MG: 120 CAPSULE, COATED, EXTENDED RELEASE ORAL at 08:57

## 2022-06-02 RX ADMIN — METOPROLOL SUCCINATE 25 MG: 25 TABLET, EXTENDED RELEASE ORAL at 08:57

## 2022-06-02 RX ADMIN — SODIUM CHLORIDE, PRESERVATIVE FREE 10 ML: 5 INJECTION INTRAVENOUS at 08:56

## 2022-06-02 ASSESSMENT — PAIN SCALES - GENERAL: PAINLEVEL_OUTOF10: 0

## 2022-06-02 NOTE — TELEPHONE ENCOUNTER
Received a call from Zac Booth. Pt is being followed by [facility]. Pt is not to be scheduled with out clinic.

## 2022-06-02 NOTE — TELEPHONE ENCOUNTER
[facility] called to schedule a 2 week hfu for Oscarkay Nieto. [facility] was unable to accommodate in time frame needed. Please return their call with patients appointment at [phone]. Patient is being discharged today from [facility] and needs a HFU with Dr Roque Wayne. Please call patient with the appointment. Thank you.

## 2022-06-02 NOTE — CARE COORDINATION
Determined pt costs for eiliquis through preferred pharmacy [facility]) as $9.85 which is affordable to the pt. NATALIIA also provided the pt with the 30day first fill free coupon for use upon dc. Notified SOPHIE Dominguez and yakelin Munoz.

## 2022-06-02 NOTE — PROGRESS NOTES
Cardiology Daily Note Vanna Meyers MD      Patient:  Tati Odonnell  [id number]    Patient Active Problem List    Diagnosis Date Noted    Atrial fibrillation with RVR ([address]     Heart attack ([address]     Acute ST elevation myocardial infarction (STEMI) of anteroseptal wall ([address]     New onset atrial fibrillation ([address] 05/31/2022    S/P lobectomy of lung 05/31/2022    Lung mass 05/23/2022    Squamous cell carcinoma of right lung ([address] 05/03/2022    Lung nodule 04/20/2022    Smoking history 04/20/2022    Hx of CABG 04/20/2022    History of MI (myocardial infarction) 04/20/2022    Primary insomnia 03/25/2021    ABHINAV (generalized anxiety disorder) 03/25/2021    Essential hypertension 03/25/2021    Chronic tension-type headache, not intractable 03/25/2021       Admit Date:  5/31/2022    Admission Problem List: Present on Admission:   New onset atrial fibrillation ([address]   Squamous cell carcinoma of right lung ([address]   Smoking history   S/P lobectomy of lung   Atrial fibrillation with RVR ([address]      Cardiac Specific Data:  Specialty Problems        Cardiology Problems    Acute ST elevation myocardial infarction (STEMI) of anteroseptal wall ([address]        Heart attack ([address]        Atrial fibrillation with RVR ([address]        * (Principal) New onset atrial fibrillation ([address]        Essential hypertension              Subjective:  Mr. Janneth Gamboa seen today heart rate slightly elevated this morning feels well no complaints started on Eliquis. Blood pressure 110/82 heart 85. Objective:   /82   Pulse 85   Temp 97.2 °F (36.2 °C) (Temporal)   Resp 18   Ht 5' 10\" (1.778 m)   Wt 169 lb 4.8 oz (76.8 kg)   SpO2 96%   BMI 24.29 kg/m²       Intake/Output Summary (Last 24 hours) at 6/2/2022 0936  Last data filed at 6/2/2022 0538  Gross per 24 hour   Intake 480 ml   Output 300 ml   Net 180 ml       Prior to Admission medications    Medication Sig Start Date End Date Taking?  Authorizing Provider oxyCODONE-acetaminophen (PERCOCET)  MG per tablet Take 0.5 tablets by mouth as needed for Pain. Half tab  For pain from surgery   Yes Historical Provider, MD   atorvastatin (LIPITOR) 40 MG tablet Take 40 mg by mouth nightly   Yes Historical Provider, MD   clonazePAM (KLONOPIN) 1 MG tablet Take 1 tablet by mouth 3 times daily as needed for Anxiety for up to 30 days. Patient taking differently: Take 1 mg by mouth nightly. 5/31/22 6/30/22  CAPO Norman   butalbital-acetaminophen-caffeine (FIORICET, ESGIC) -60 MG per tablet Take 1 tablet by mouth every 6 hours as needed for Headaches 5/31/22   CAPO Norman   Metoprolol Succinate 25 MG CS24 Take 12.5 mg by mouth daily Pt is taking 1/2 pill only 5/25/22   CAPO Crowell   sacubitril-valsartan (ENTRESTO) 24-26 MG per tablet Take 1 tablet by mouth 2 times daily Pt did not have bottle to verify dose  Patient taking differently: Take 1 tablet by mouth 2 times daily  5/25/22   CAPO Crowell   furosemide (LASIX) 40 MG tablet Take 1 tablet by mouth daily 5/25/22   CAPO Crowell   spironolactone (ALDACTONE) 25 MG tablet Take 1 tablet by mouth daily 5/25/22   CAPO Crowell   melatonin 3 mg TABS tablet Take 1 tablet by mouth nightly as needed (sleep) 5/25/22   CAPO Crowell   clopidogrel (PLAVIX) 75 MG tablet Take 1 tablet by mouth daily 5/25/22   CAPO Crowell   aspirin 81 MG EC tablet Take 1 tablet by mouth daily 5/25/22   [place] CAPO Ken   magnesium gluconate (MAGONATE) 500 MG tablet Take 0.5 tablets by mouth daily  Patient taking differently: Take 250 mg by mouth daily Half tab (250mg) 5/25/22   CAPO Crowell   esomeprazole ([address]) 40 MG delayed release capsule Take 1 capsule by mouth daily 11/8/21   CAPO Ford   [facility]  Devices (ROLLATOR ULTRA-LIGHT) MISC Use to walk 10/29/21   Veryl Gordon Deras, APRN        dilTIAZem  120 mg Oral BID    apixaban  5 mg Oral BID    metoprolol succinate  25 mg Oral Nightly    [START ON 6/3/2022] metoprolol succinate  50 mg Oral QAM    metoprolol succinate  25 mg Oral Once    sodium chloride flush  5-40 mL IntraVENous 2 times per day    aspirin  81 mg Oral Daily    clopidogrel  75 mg Oral Daily    pantoprazole  40 mg Oral QAM AC    Magnesium Gluconate  250 mg Oral Daily    sacubitril-valsartan  1 tablet Oral BID       TELEMETRY: Atrial fibrillation     Physical Exam:      Physical Exam      General:  Awake, alert, NAD  Skin:  Warm and dry  Neck:  no jvd , no carotid bruits  Chest:  Clear to auscultation, no wheezing or rales  Cardiovascular:  IRRR J0E0 no murmurs, clicks, gallups, or rubs  Abdomen:  Soft nontender, nondistended, bowel sounds present  Extremities:  Edema: none      Lab Data:  CBC:   Recent Labs     05/31/22  1511   WBC 8.6   HGB 12.5*   HCT 38.3*   MCV 92.5        BMP:   Recent Labs     05/31/22  1511 06/01/22  0632 06/01/22  1031 06/02/22  0338    138  --  139   K 4.1 4.4  --  4.7   CL 99 101  --  103   CO2 27 26  --  26   PHOS  --   --  3.7  --    BUN 12 9  --  12   CREATININE 1.0 0.9  --  0.9     LIVER PROFILE:   Recent Labs     05/31/22  1511   AST 26   ALT 29   BILITOT 0.3   ALKPHOS 65     PT/INR:   Recent Labs     05/31/22  1511   PROTIME 13.2   INR 1.01     APTT:   Recent Labs     05/31/22  1511   APTT 26.5     BNP:  No results for input(s): BNP in the last 72 hours. CK, CKMB, Troponin: @LABRCNT (CKTOTAL:3, CKMB:3, TROPONINI:3)@    IMAGING:  XR CHEST (2 VW)    Result Date: 5/25/2022  EXAMINATION: Chest 2 views 5/25/2022 HISTORY: Post chest tube removal FINDINGS: Today's exam is compared to previous study of earlier the same day. A right-sided thoracostomy tube has been removed with no appreciable pneumothorax. There is volume loss within the right lung status post thoracotomy. Left lung is fully expanded and clear. 1.. Right-sided thoracostomy tube removed without complications.  There is no pneumothorax. 2. Left lung remains clear. Signed by Dr Linette Mcelroy    XR CHEST (2 VW)    Result Date: 5/19/2022  XR CHEST (2 VW) 5/19/2022 10:43 AM History: Preop thoracic surgery for resection of the upper right lung. Neoplastic lung nodule. Two-view chest x-ray. CABG changes. Heart size is within normal limits. The mediastinum has a normal appearance. The lungs are mildly hyperexpanded with no pneumonia or pneumothorax. There is mild chronic interstitial disease with scattered calcified granulomas. There is no significant pleural fluid. No congestive failure changes. Unchanged 2 cm nodule within the base of the right upper lobe. 1. Known right lung nodule. 2. Mild chronic lung changes with hyperexpansion. 3. No acute infiltrate. Signed by Dr Adenike Emerson    XR CHEST PORTABLE    Result Date: 5/31/2022  NO PRIOR REPORT AVAILABLE Exam: X-RAY OF [facility] Clinical data:Atrial fibrillation, rapid ventricular rate. Technique:Single view of the chest. Prior studies: No prior studies submitted. Findings: The lungs are grossly clear; noevidence of acute infiltrate or pleural effusion. Median sternotomy and CABG. Cardiac silhouette is within normal limits. Moderate eventration right hemidiaphragm. No acute osseous abnormality is detected. Median sternotomy and CABG. No acute cardiopulmonary process. Recommendation: Follow up as clinically indicated. Electronically Signed by Audra Mazariegos MD at [id number] 06:96:32 PM EST             XR CHEST PORTABLE    Result Date: 5/25/2022  Examination. XR CHEST PORTABLE 5/25/2022 4:41 AM History: Postop thoracic surgery. A frontal portable upright view of the chest is compared with the previous study dated 5/24/2022. There is loss of right lung volume which appears more pronounced in the previous study. There are atelectatic changes in the right lung which are more advanced since the previous study.  The elevation right diaphragm persists. Previously seen right apical pneumothorax is not visualized in this study. The left lung is unremarkable except for minimal scarring and atelectasis. The cardiomediastinal structures and distal trachea are displaced towards the right. There is moderate fullness of the right hilum which may represent an area of consolidation, infiltrate or a mass. No change. There is evidence of previous cardiac surgery. There is a persistent soft tissue air along the lower right lateral chest wall. No acute bony abnormality. 1. Loss of right lung volume appears more progressive since the previous study. 2. Right apical pneumothorax is not seen in the present study. Signed by Dr Kingston Cardoso    Result Date: 5/24/2022  EXAMINATION: Chest one view 5/24/2022 HISTORY: Chest tube placed to waterseal FINDINGS: There is volume loss on the right. A right-sided thoracostomy tube is in place. There is a tiny right apical pneumothorax. Left lung is clear except for mild left basilar atelectasis. The thoracic aorta and great vessels are ectatic. 1.. Tiny right apical pneumothorax. There is volume loss on the right post thoracotomy. A right-sided thoracostomy tube remains in place. Signed by Dr Deandra Smith    Result Date: 5/24/2022  Examination. XR CHEST PORTABLE 5/24/2022 4:37 AM History: Postop thoracic surgery. A frontal portable upright view of the chest is compared with the previous study dated 5/23/2022. The lungs are poorly expanded. There is moderate elevation right diaphragm similar to the previous study. There is no evidence of recent infiltrate, pleural effusion, pulmonary congestion or pneumothorax. The heart size is not evaluated. There is evidence of previous cardiac surgery. The previously seen endotracheal tube have been removed in the interval. No acute bony abnormality.  There is soft tissue air along the right lateral chest wall which was not noted in the previous study. 1. Poor lung expansion but no active cardiopulmonary disease. 2. Soft tissue air along the right lateral chest wall was not noted in the previous study. This may represent postsurgical changes. Clinical correlation may be obtained. Signed by Dr Rizwan Gonzalez    Result Date: 5/23/2022  XR CHEST PORTABLE 5/23/2022 12:44 PM History: Postop thoracotomy. Portable chest x-ray. Comparison is made with May 19, 2022. Endotracheal tube present. Endotracheal tube tip lies approximately 20 mm above the madelin. There is also selective right lower lobe endobronchial intubation. Right upper lobe atelectasis noted. The left lung is fully expanded and clear. The right lower lung is normally expanded. CABG changes noted. 1. Intubation. Other than mild right upper lobe atelectasis the lungs are clear. Signed by Dr Saumya Kirby        Assessment:  1. Admission 5/31/2022 irregular heart rate  2. History of tobacco usage  3. Squamous cell carcinoma right lung  4. Status post right upper lobe lobectomy 5/23/2022  5. History of non-STEMI  6. Coronary artery disease  7. Pulmonary nodule  8. Prior CABG  9. Anxiety disorder  10. Hypertension  11. Chronic headaches  12. COPD  15. CT needle biopsy 4/29/2022  14. Echocardiogram 10/21/2021 ejection fraction 25 to 30% severe anterior and anterolateral wall hypokinesis  15. Cardiac cath 10/21/2021 moderately severely impaired LV systolic function severe multivessel coronary disease PTCA proximal LAD 2.0 mm balloon CABG recommended  16. Status post CABG 10/2021 x 5 with LIMA graft       Plan:  1. Increase Cardizem CD1 20 mg p.o. twice daily  2. Increase Toprol-XL to 50 mg p.o. every morning and 25 mg p.o. every afternoon  3. Continue Eliquis 5 mg p.o. twice daily  4.  If heart rate acceptable could be discharged later today follow-up with his established cardiologist in 2 to 3 weeks    Court Hurt MD, MD 6/2/2022 9:36 AM

## 2022-06-02 NOTE — PROGRESS NOTES
[place], [place], [address]    DEPARTMENT OF HOSPITALIST MEDICINE      PROGRESS  NOTE:    NOTE: Portions of this note have been copied forward, however, changed to reflect the most current clinical status of this patient. Patient:  Philip Cheng  YOB: 1955  Date of Service: 6/1/2022  MRN: [id number]   Acct: [de-identified]   Primary Care Physician: CAPO Mtz  Advance Directive: Full Code  Admit Date: 5/31/2022       Hospital Day: 1      CHIEF COMPLAINT:  Chief Complaint   Patient presents with    Irregular Heart Beat     sent from PCP for A-fib. Heart rate 158 in traige        SUBJECTIVE:  Patient seen and evaluated at bedside during my morning rounds. His palpitations are improved. He feels tired and fatigued      [address]:    06/01/2022:  Patient seen and evaluated by both CT surgery and cardiology. His A. fib RVR has resolved. Patient has been approved by CT surgery to be started on Eliquis as per cardiology recommendations. Cardiology also adjusted his cardiac meds. 05/31/2022: History Of Present Illness: The patient is a 77 y.o. male who presented to [facility] ER with PMH CAD, CABG, HTN, hyperlipidemia, GERD, COPD, former smoker, skin cancer to nose, complaining of irregular heart rate. Patient was recently hospitalized on 5/23 due to right upper lobe lung mass. Found to have squamous cell carcinoma with evidence of distant metastatic disease on PET /CT scan. While admitted he had a minimally invasive robotic assisted right upper lobectomy with regional lymph node dissection by Dr. Terra Rawls. Patient was discharged in stable condition on 5/25. Today patient presented to PCP for hospital follow-up. He states that he has had fatigue and shortness of breath since discharge. While at PCP office patient found to have heart rate ranging from 140-180 and new onset atrial fibrillation. Denies palpitation, lightheadedness, dizziness, and chest pain. Patient was instructed to [facility] ER for further evaluation. Work-up in ER CXR no acute cardiopulmonary process, troponin negative, BNP 7323, TSH with reflex 2.91, urinalysis negative, and COVID-negative. Received digoxin 250 MCG IV, diltiazem 12.5 mg IV while in ER. Patient is to be admitted to the hospitalist service with consultation to cardiology due to new onset atrial fibrillation. Courtesy consult to cardiothoracic surgery from recent lobectomy.       REVIEW  OF  SYSTEMS:    Constitutional:  No fevers, chills, nausea, vomiting, + tiredness and fatigue   Lungs:   No hemoptysis, pleurisy, cough,+/-  SOB   Heart:  No chest pressure with exertion, +/- palpitations,    Abdomen:   No new masses, no bright red blood per rectum   Extremities:  + difficulty ambulation due to weakness, no new lesions   Neurologic: No new motor or sensory changes     14 point review of systems addressed with patient which is essentially negative except as specifically addressed above:    PAST MEDICAL HISTORY:  Past Medical History:   Diagnosis Date    Arthritis     hands    CAD (coronary artery disease)     Cancer ([address]     Septum     CHF (congestive heart failure) ([address]     sees dr. Sara Ash COPD [facility]chronic obstructive [facility] disease) [facility]     Headache     Hypertension     Lung nodule 12/2021    right upper lobe    MI (myocardial infarction) ([facility]          PAST SURGICAL HISTORY:  Past Surgical History:   Procedure Laterality Date    CARDIAC SURGERY      CORONARY ARTERY BYPASS GRAFT  10/2021    CT NEEDLE BIOPSY LUNG PERCUTANEOUS  4/29/2022    CT NEEDLE BIOPSY LUNG PERCUTANEOUS 4/29/2022 [facility] CT SCAN    LUNG REMOVAL, TOTAL Right 5/23/2022    ROBOTIC RIGHT UPPER LOBECTOMY performed by Avinash Rodríguez MD at [address] [place] [address] NOSE SURGERY  09/2016    SKIN BIOPSY      nose        FAMILY HISTORY:  Family History   Problem Relation Age of Onset    Cancer Mother     Coronary Art Dis Father     Coronary Art Dis Brother OBJECTIVE:  BP 92/70   Pulse 83   Temp 97 °F (36.1 °C) (Temporal)   Resp 20   Ht 5' 10\" (1.778 m)   Wt 168 lb 3.2 oz (76.3 kg)   SpO2 96%   BMI 24.13 kg/m²   No intake/output data recorded.     PHYSICAL  EXAMINATION:    [place]:  Awake, alert, oriented x 3, patient appears tired and fatigued   Head/Eyes:  Normocephalic, atraumatic, EOMI and PERRLA bilaterally   Respiratory:   Bilateral decreased air entry in both lung fields, mild B/L crackles, mild bilateral rales   Cardiovascular:   Irregularly irregular rate and rhythm, S1+S2+0, no murmurs/rubs   Abdomen:   Soft, non-tender, bowel sounds +ve, no organomegaly   Extremities: Moves all, decreased range of motion, no edema   Neurologic: Awake, alert, oriented x 3, cranial nerves II-XII intact, no focal neurological deficits, sensory system intact   Psychiatric: Normal mood, non-suicidal       CURRENT MEDICATIONS:  Scheduled:   dilTIAZem  120 mg Oral Daily    metoprolol succinate  25 mg Oral BID    sodium chloride flush  5-40 mL IntraVENous 2 times per day    enoxaparin  1 mg/kg SubCUTAneous BID    aspirin  81 mg Oral Daily    clopidogrel  75 mg Oral Daily    pantoprazole  40 mg Oral QAM AC    Magnesium Gluconate  250 mg Oral Daily    sacubitril-valsartan  1 tablet Oral BID        PRN:  sodium chloride flush, 5-40 mL, PRN  sodium chloride, , PRN  ondansetron, 4 mg, Q8H PRN   Or  ondansetron, 4 mg, Q6H PRN  polyethylene glycol, 17 g, Daily PRN  acetaminophen, 650 mg, Q6H PRN   Or  acetaminophen, 650 mg, Q6H PRN  clonazePAM, 1 mg, TID PRN  melatonin, 3 mg, Nightly PRN        Infusions:   phenylephrine (AMAN-SYNEPHRINE) 50mg/250mL infusion Stopped (05/31/22 1716)    dilTIAZem Stopped (06/01/22 0525)    sodium chloride         Laboratory Data:  Recent Labs     05/31/22  1511   WBC 8.6   HGB 12.5*        Recent Labs     05/31/22  1511 06/01/22  0632    138   K 4.1 4.4   CL 99 101   CO2 27 26   BUN 12 9   CREATININE 1.0 0.9   GLUCOSE 134* 101 Recent Labs     05/31/22  1511   AST 26   ALT 29   BILITOT 0.3   ALKPHOS 65     Troponin T:   Recent Labs     05/31/22  1511 05/31/22  2253 06/01/22  0632   TROPONINI <0.01 <0.01 <0.01     Pro-BNP: No results for input(s): BNP in the last 72 hours. INR:   Recent Labs     05/31/22  1511   INR 1.01     UA:  Recent Labs     05/31/22  1616   COLORU YELLOW   PHUR 7.0   CLARITYU Clear   SPECGRAV 1.009   LEUKOCYTESUR Negative   UROBILINOGEN 1.0   BILIRUBINUR Negative   BLOODU Negative   GLUCOSEU Negative     A1C: No results for input(s): LABA1C in the last 72 hours. ABG:No results for input(s): PHART, UUE3GED, PO2ART, WYW4QRO, BEART, HGBAE, K1EMSUBH, CARBOXHGBART in the last 72 hours. Imaging:    XR CHEST PORTABLE    Result Date: 5/31/2022  Median sternotomy and CABG. No acute cardiopulmonary process. Recommendation: Follow up as clinically indicated. Electronically Signed [id number]:78:27 PM EST                ASSESSMENT & PLAN:    Principal Problem:    New onset atrial fibrillation ([facility]  Active Problems:    Atrial fibrillation with RVR ([facility]    Smoking history    Squamous cell carcinoma of right lung (HCC)    S/P lobectomy of lung  Resolved Problems:    * No resolved hospital problems. *      Continue with current medications  Patient's A. fib with RVR has resolved with IV Cardizem drip  Patient weaned off from IV Cardizem and started on oral Cardizem CD  Adjusted Toprol-XL to 25 mg p.o. twice daily by cardiology  Cardiology recommended Eliquis 5 mg p.o. twice daily which has been approved by CT surgery to start  Cardiology discussed option for CURTIS cardioversion versus anticoagulation and staged cardioversion in 2 to 3 weeks with patient  5/23/2022 . ..  patient recently had minimally invasive, robotic assisted, right upper lobectomy and regional lymph node dissection for right upper lobe cancer  Follow-up closely with CT surgery and cardiology for further management recommendations  Patient will need close follow-up with oncology upon discharge  PT/OT evaluation ordered  Case management consult for DC planning      Chronic medical issues . .. Continue with home meds. Monitor patient closely while admitted. Advised very close f/u with patient's PCP as an outpatient to address chronic medical issues. Repeat labs in a.m. Electrolyte replacement as per protocol. Patient will be monitored very closely on the floor. Further recommendations as per the hospital course. The patient's management will be taken over by our covering Hospitalist Physician, Dr. Josias Lion, in a.m . .. I am signing off! Discharge Plan: To be determined as per the hospital course      Patient  is on DVT prophylaxis  Current medications reviewed  Lab work reviewed  Radiology/Chest x-ray films reviewed  Treatment recommendations from suspecialities reviewed, appreciated and agreed with  Discussed with the nurse and addressed all questions/concerns  Discussed with Patient and/or Family at the bedside in detail . .. they understand and agree with the management plan. Isaias Betancur MD  6/1/2022 8:04 PM      DISCLAIMER: This note was created with electronic voice recognition which does have occasional errors. If you have any questions regarding the content within the note please do not hesitate to contact me. .. Thanks. last specimen

## 2022-06-02 NOTE — DISCHARGE SUMMARY
[place], [place], [address]    DEPARTMENT OF HOSPITALIST MEDICINE      DISCHARGE SUMMARY:      PATIENT NAME:  Celena Randle  :  1955  MRN:  [id number]    Admission Date:   2022  3:11 PM Attending: Santa Merrill MD   Discharge Date:   2022              PCP: CAPO Dominguez  Length of Stay: 2 days     Chief Complaint on Admission:   Chief Complaint   Patient presents with    Irregular Heart Beat     sent from PCP for A-fib. Heart rate 158 in traige        Consultants:     IP CONSULT TO [address] [place] [address] [place] CONSULT TO CARDIOLOGY  IP CONSULT TO HOME CARE NEEDS       Discharge Problem List:   Principal Problem:    New onset atrial fibrillation ([facility]  Active Problems:    Atrial fibrillation with RVR ([facility]    Smoking history    Squamous cell carcinoma of right lung (HCC)    S/P lobectomy of lung  Resolved Problems:    * No resolved hospital problems. *         [address]  COURSE  AND  TREATMENT:    Hospital stay patient was seen by thoracic surgery and cardiology. He was treated with Cardizem as well as metoprolol XL and his A. fib resolved he is currently in normal sinus rhythm. As per cardiology plan for CURTIS and cardioversion within 2 to 3 weeks as outpatient. Patient will follow up with his cardiologist and cardiovascular surgery for reevaluation. His medications have been adjusted per cardiology service. 1. Increase Cardizem CD1 20 mg p.o. twice daily  2. Increase Toprol-XL to 50 mg p.o. every morning and 25 mg p.o. every afternoon  3. Continue Eliquis 5 mg p.o. twice daily    Patient verbalized understanding discharge and follow-up instructions all questions answered. History Of Present Illness: The patient is a 76 y. o. male who presented to [facility] ER with PMH CAD, CABG, HTN, hyperlipidemia, GERD, COPD, former smoker, skin cancer to nose, complaining of irregular heart rate.  Patient was recently hospitalized on  due to right upper lobe lung mass.  Found to have squamous cell carcinoma with evidence of distant metastatic disease on PET /CT scan.  While admitted he had a minimally invasive robotic assisted right upper lobectomy with regional lymph node dissection by Dr. Bethany Presley was discharged in stable condition on 5/25.   Today patient presented to PCP for hospital follow-up. [facility] states that he has had fatigue and shortness of breath since discharge.  While at PCP office patient found to have heart rate ranging from 140-180 and new onset atrial fibrillation.  Denies palpitation, lightheadedness, dizziness, and chest pain.  Patient was instructed to [facility] ER for further evaluation.  Work-up in ER CXR no acute cardiopulmonary process, troponin negative, PDV 1470, TSH with reflex 2.91, urinalysis negative, and COVID-negative.  Received digoxin 250 MCG IV, diltiazem 12.5 mg IV while in ER.  Patient is to be admitted to the hospitalist service with consultation to cardiology due to new onset atrial fibrillation.  Courtesy consult to cardiothoracic surgery from recent lobectomy. OBJECTIVE:  BP (!) 140/96   Pulse (!) 102   Temp 97.5 °F (36.4 °C) (Temporal)   Resp 18   Ht 5' 10\" (1.778 m)   Wt 169 lb 4.8 oz (76.8 kg)   SpO2 97%   BMI 24.29 kg/m²       Heart: RRR no murmurs no rubs.    Lungs: Bilateral fair air entry   Abdomen: Soft, non-tender   Extremities: No edema   Neurologic: Alert and oriented   Skin: Warm and dry           Medication List      START taking these medications    apixaban 5 MG Tabs tablet  Commonly known as: ELIQUIS  Take 1 tablet by mouth 2 times daily     dilTIAZem 120 MG extended release capsule  Commonly known as: CARDIZEM CD  Take 1 capsule by mouth in the morning and at bedtime     * metoprolol succinate 25 MG extended release tablet  Commonly known as: TOPROL XL  Take 1 tablet by mouth at bedtime for 30 doses     * metoprolol succinate 50 MG extended release tablet  Commonly known as: TOPROL XL  Take 1 tablet by mouth every morning  Start taking on: Jana 3, 2022  Replaces: Metoprolol Succinate 25 MG Cs24         * This list has 2 medication(s) that are the same as other medications prescribed for you. Read the directions carefully, and ask your doctor or other care provider to review them with you. CHANGE how you take these medications    clonazePAM 1 MG tablet  Commonly known as: KlonoPIN  Take 1 tablet by mouth 3 times daily as needed for Anxiety for up to 30 days.   What changed: when to take this     Entresto 24-26 MG per tablet  Generic drug: sacubitril-valsartan  Take 1 tablet by mouth 2 times daily Pt did not have bottle to verify dose  What changed: additional instructions     magnesium gluconate 500 MG tablet  Commonly known as: MAGONATE  Take 0.5 tablets by mouth daily  What changed: additional instructions        CONTINUE taking these medications    aspirin 81 MG EC tablet  Take 1 tablet by mouth daily     atorvastatin 40 MG tablet  Commonly known as: LIPITOR     butalbital-acetaminophen-caffeine -40 MG per tablet  Commonly known as: FIORICET, ESGIC  Take 1 tablet by mouth every 6 hours as needed for Headaches     clopidogrel 75 MG tablet  Commonly known as: PLAVIX  Take 1 tablet by mouth daily     esomeprazole 40 MG delayed release capsule  Commonly known as: NexIUM  Take 1 capsule by mouth daily     furosemide 40 MG tablet  Commonly known as: LASIX  Take 1 tablet by mouth daily     melatonin 3 mg Tabs tablet  Take 1 tablet by mouth nightly as needed (sleep)     oxyCODONE-acetaminophen  MG per tablet  Commonly known as: PERCOCET     Rollator Ultra-Light Misc  Use to walk     spironolactone 25 MG tablet  Commonly known as: ALDACTONE  Take 1 tablet by mouth daily        STOP taking these medications    Metoprolol Succinate 25 MG Cs24  Replaced by: metoprolol succinate 50 MG extended release tablet           Where to Get Your Medications      These medications were sent to [facility], [address] [place] [address] [address] [facility]  [address], [address] [place] [address]    Phone: [phone]   · apixaban 5 MG Tabs tablet  · dilTIAZem 120 MG extended release capsule  · metoprolol succinate 25 MG extended release tablet  · metoprolol succinate 50 MG extended release tablet           Laboratory Data:  Recent Labs     05/31/22  1511   WBC 8.6   HGB 12.5*        Recent Labs     05/31/22  1511 06/01/22  0632 06/02/22  0338    138 139   K 4.1 4.4 4.7   CL 99 101 103   CO2 27 26 26   BUN 12 9 12   CREATININE 1.0 0.9 0.9   GLUCOSE 134* 101 100     Recent Labs     05/31/22  1511   AST 26   ALT 29   BILITOT 0.3   ALKPHOS 65     Troponin T:   Recent Labs     05/31/22  1511 05/31/22  2253 06/01/22  0632   TROPONINI <0.01 <0.01 <0.01     Pro-BNP: No results for input(s): BNP in the last 72 hours. INR:   Recent Labs     05/31/22  1511   INR 1.01     UA:  Recent Labs     05/31/22  1616   COLORU YELLOW   PHUR 7.0   CLARITYU Clear   SPECGRAV 1.009   LEUKOCYTESUR Negative   UROBILINOGEN 1.0   BILIRUBINUR Negative   BLOODU Negative   GLUCOSEU Negative     A1C: No results for input(s): LABA1C in the last 72 hours. ABG:No results for input(s): PHART, EET9GVM, PO2ART, SZM3OKB, BEART, HGBAE, E4VBQEYC, CARBOXHGBART in the last 72 hours. Impressions of imaging performed in 48 hours before discharge:    XR CHEST PORTABLE    Result Date: 5/31/2022  Median sternotomy and CABG. No acute cardiopulmonary process. Recommendation: Follow up as clinically indicated. Electronically Signed by Joan Benavidez MD at [id number] 86:74:73 PM EST                  ISSUES TO BE ADDRESSED AT HOSPITAL FOLLOW-UP VISIT:    Advised patient to follow-up closely with PCP upon discharge for management of chronic medical issues. Please see the detailed discharge directions delivered from earlier today!     Condition on Discharge: stable  Discharge Disposition: Home    Recommended Follow Up:  Deepak Vargas, APRN  [address]  [phone]    In 1 week  For reevaluation and further medication refills    Matra Sanchez MD  [address] [phone]    In 1 week  For reevaluation    Mary Ann Marquez MD  [address]  [phone]    In 2 weeks  For reevaluation    Followup Appointments Scheduled at Time of Discharge:  Future Appointments   Date Time Provider [place]   6/6/2022 11:00 AM Claudette Balderas MD [facility]   6/16/2022 10:15 AM SCHEDULE, [facility]   6/16/2022 10:45 AM Lakshmi Campuzano MD [facility]   6/22/2022  2:00 PM Marta Sanchez MD [facility]   7/14/2022  1:15 PM Shiva Milton MD [facility]        Discharge Instructions:   Please see the discharge paperwork. Patient was seen at bedside today, and the examination shows improvement since yesterday. Detailed discharge directions delivered to the patient by myself and our nursing staff, who verbalizes understanding and is very happy and satisfied with the plan. Patient has been advised to continue all medications as prescribed and advised, and f/u with PCP within 1 week as cardiologist and thoracic surgery as scheduled. Patient is stable from medical standpoint to be discharged. Total time spent during patient evaluation and assessment, discussion with the nurse/family, addressing discharge medications/scripts and coordination of care for safe discharge was in excess of 38 minutes.       Signed Electronically:    Mana Nazario MD  12:45 PM 6/2/2022

## 2022-06-02 NOTE — DISCHARGE INSTR - DIET

## 2022-06-03 ENCOUNTER — TELEPHONE (OUTPATIENT)
Dept: PRIMARY CARE CLINIC | Age: 67
End: 2022-06-03

## 2022-06-03 DIAGNOSIS — I48.91 NEW ONSET A-FIB (HCC): ICD-10-CM

## 2022-06-03 DIAGNOSIS — R06.02 SOB (SHORTNESS OF BREATH): ICD-10-CM

## 2022-06-03 DIAGNOSIS — I49.9 IRREGULAR HEART RATE: Primary | ICD-10-CM

## 2022-06-03 NOTE — TELEPHONE ENCOUNTER
pts daughter called, she just wanted you to know that they are home from the hospital. They have his Afib under control with meds. He is doing good and is not even mad anymore. And she would like a rx for a pulse ox to [facility] please.

## 2022-06-06 ENCOUNTER — OFFICE VISIT (OUTPATIENT)
Dept: PULMONOLOGY | Age: 67
End: 2022-06-06
Payer: MEDICARE

## 2022-06-06 VITALS
WEIGHT: 166.6 LBS | OXYGEN SATURATION: 99 % | BODY MASS INDEX: 23.85 KG/M2 | DIASTOLIC BLOOD PRESSURE: 64 MMHG | HEART RATE: 80 BPM | HEIGHT: 70 IN | SYSTOLIC BLOOD PRESSURE: 104 MMHG

## 2022-06-06 DIAGNOSIS — Z95.1 HX OF CABG: ICD-10-CM

## 2022-06-06 DIAGNOSIS — C34.91 SQUAMOUS CELL CARCINOMA OF RIGHT LUNG (HCC): Primary | ICD-10-CM

## 2022-06-06 DIAGNOSIS — Z87.891 SMOKING HISTORY: ICD-10-CM

## 2022-06-06 DIAGNOSIS — I48.91 ATRIAL FIBRILLATION WITH RVR (HCC): ICD-10-CM

## 2022-06-06 PROCEDURE — 99214 OFFICE O/P EST MOD 30 MIN: CPT | Performed by: INTERNAL MEDICINE

## 2022-06-06 PROCEDURE — 1123F ACP DISCUSS/DSCN MKR DOCD: CPT | Performed by: INTERNAL MEDICINE

## 2022-06-06 ASSESSMENT — ENCOUNTER SYMPTOMS
ANAL BLEEDING: 0
ABDOMINAL PAIN: 0
APNEA: 0
WHEEZING: 0
SHORTNESS OF BREATH: 1
RHINORRHEA: 0
CHEST TIGHTNESS: 0
ABDOMINAL DISTENTION: 0
BACK PAIN: 0
COUGH: 0

## 2022-06-06 NOTE — PROGRESS NOTES
Pulmonary and Sleep Medicine    Celena Randle (:  1955) is a 79 y.o. male,Established patient, here for evaluation of the following chief complaint(s):  Follow-up (Pt came in today for a follow up post robotic lobectomy done on 22)      Referring physician:  No referring provider defined for this encounter. ASSESSMENT/PLAN:  1. Squamous cell carcinoma of right lung [facility]) Post robotic lobectomy 2022 Dr. Ricardo Marrero.   -     Pam Deiters; Future  2. Hx of CABG  3. Smoking history  4. Atrial fibrillation with RVR (HCC)        Status post lobectomy. Events of local invasion or distant metastatic disease. We will repeat CT of the chest in 6 months and every 6 months for 3 years then every year for 2 years to assure no recurrence. Dimple Ndiaye MD, [facility], [facility]    Return in about 6 months (around 2022). SUBJECTIVE/OBJECTIVE:  The patient is here for follow-up after his lobectomy. Underwent lobectomy on 2022 by Dr. Ricardo Marrero. Pathology was reviewed. No evidence of metastatic lymphadenopathy. Surgical margins are clear. 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 Prior to Visit Medications    Medication Sig Taking? Authorizing Provider   Misc. Devices (PULSE OXIMETER FOR FINGER) MISC Use as directed Yes CAPO Gutierrez   apixaban (ELIQUIS) 5 MG TABS tablet Take 1 tablet by mouth 2 times daily Yes Santa Merrill MD   dilTIAZem (CARDIZEM CD) 120 MG extended release capsule Take 1 capsule by mouth in the morning and at bedtime Yes Sonny Francis MD   metoprolol succinate (TOPROL XL) 25 MG extended release tablet Take 1 tablet by mouth at bedtime for 30 doses Yes Sonny Francis MD   metoprolol succinate (TOPROL XL) 50 MG extended release tablet Take 1 tablet by mouth every morning Yes Sonny Francis MD   clonazePAM (KLONOPIN) 1 MG tablet Take 1 tablet by mouth 3 times daily as needed for Anxiety for up to 30 days.   Patient taking differently: Take 1 mg by mouth nightly. Yes CAPO Wiley   butalbital-acetaminophen-caffeine (FIORICET, ESGIC) -40 MG per tablet Take 1 tablet by mouth every 6 hours as needed for Headaches Yes CAPO Wiley   oxyCODONE-acetaminophen (PERCOCET)  MG per tablet Take 0.5 tablets by mouth as needed for Pain. Half tab  For pain from surgery Yes Historical Provider, MD   atorvastatin (LIPITOR) 40 MG tablet Take 40 mg by mouth nightly Yes Historical Provider, MD   sacubitril-valsartan (ENTRESTO) 24-26 MG per tablet Take 1 tablet by mouth 2 times daily Pt did not have bottle to verify dose  Patient taking differently: Take 1 tablet by mouth 2 times daily  Yes CAPO Otoole   furosemide (LASIX) 40 MG tablet Take 1 tablet by mouth daily Yes CAPO Otoole   spironolactone (ALDACTONE) 25 MG tablet Take 1 tablet by mouth daily Yes CAPO Otoole   melatonin 3 mg TABS tablet Take 1 tablet by mouth nightly as needed (sleep) Yes CAPO Otoole   clopidogrel (PLAVIX) 75 MG tablet Take 1 tablet by mouth daily Yes CAPO Otoole   aspirin 81 MG EC tablet Take 1 tablet by mouth daily Yes CAPO Otoole   magnesium gluconate (MAGONATE) 500 MG tablet Take 0.5 tablets by mouth daily  Patient taking differently: Take 250 mg by mouth daily Half tab (250mg) Yes CAPO Otoole   esomeprazole (NEXIUM) 40 MG delayed release capsule Take 1 capsule by mouth daily Yes CPAO Mirza   Misc. Devices (ROLLATOR ULTRA-LIGHT) MISC Use to walk Yes CAPO Wiley        Review of Systems   Constitutional: Negative for activity change, appetite change, chills, diaphoresis and fatigue. HENT: Negative for congestion, dental problem, drooling, ear discharge, postnasal drip and rhinorrhea. Eyes: Negative for visual disturbance. Respiratory: Positive for shortness of breath. Negative for apnea, cough, chest tightness and wheezing.     Gastrointestinal: Negative for abdominal distention, abdominal pain and anal bleeding. Endocrine: Negative for cold intolerance, heat intolerance and polydipsia. Genitourinary: Negative for difficulty urinating, dysuria, enuresis and flank pain. Musculoskeletal: Negative for arthralgias, back pain and gait problem. Allergic/Immunologic: Negative for environmental allergies. Neurological: Negative for dizziness, facial asymmetry, light-headedness and headaches. Vitals:    06/06/22 1101   BP: 104/64   Pulse: 80   SpO2: 99%     BMI Readings from Last 1 Encounters:   06/06/22 23.90 kg/m²       Physical Exam  Vitals reviewed. Constitutional:       Appearance: Normal appearance. HENT:      Head: Normocephalic and atraumatic. Nose: Nose normal.   Eyes:      Extraocular Movements: Extraocular movements intact. Conjunctiva/sclera: Conjunctivae normal.   Cardiovascular:      Rate and Rhythm: Normal rate and regular rhythm. Heart sounds: No murmur heard. No friction rub. Pulmonary:      Effort: Pulmonary effort is normal. No respiratory distress. Breath sounds: Normal breath sounds. No stridor. No wheezing, rhonchi or rales. Abdominal:      General: There is no distension. Palpations: There is no mass. Tenderness: There is no abdominal tenderness. There is no guarding or rebound. Musculoskeletal:      Cervical back: Normal range of motion and neck supple. Neurological:      Mental Status: He is alert and oriented to person, place, and time. This note was generated used a voice recognition software. Errors in voice recognition may have occurred. An electronic signature was used to authenticate this note.     --Hector Ashley MD

## 2022-06-07 ENCOUNTER — OFFICE VISIT (OUTPATIENT)
Dept: PRIMARY CARE CLINIC | Age: 67
End: 2022-06-07
Payer: MEDICARE

## 2022-06-07 VITALS
OXYGEN SATURATION: 95 % | TEMPERATURE: 96.8 F | BODY MASS INDEX: 23.96 KG/M2 | HEART RATE: 76 BPM | SYSTOLIC BLOOD PRESSURE: 100 MMHG | DIASTOLIC BLOOD PRESSURE: 68 MMHG | WEIGHT: 167 LBS

## 2022-06-07 DIAGNOSIS — Z09 HOSPITAL DISCHARGE FOLLOW-UP: Primary | ICD-10-CM

## 2022-06-07 DIAGNOSIS — I48.91 ATRIAL FIBRILLATION WITH RAPID VENTRICULAR RESPONSE (HCC): ICD-10-CM

## 2022-06-07 DIAGNOSIS — C34.11 MALIGNANT NEOPLASM OF UPPER LOBE OF RIGHT LUNG (HCC): ICD-10-CM

## 2022-06-07 DIAGNOSIS — Z98.890 S/P THORACOTOMY: ICD-10-CM

## 2022-06-07 DIAGNOSIS — I48.91 NEW ONSET A-FIB (HCC): ICD-10-CM

## 2022-06-07 PROCEDURE — 1111F DSCHRG MED/CURRENT MED MERGE: CPT | Performed by: NURSE PRACTITIONER

## 2022-06-07 PROCEDURE — 99496 TRANSJ CARE MGMT HIGH F2F 7D: CPT | Performed by: NURSE PRACTITIONER

## 2022-06-07 NOTE — PROGRESS NOTES
Post-Discharge Transitional Care  Follow Up      Tavia Guo   YOB: 1955    Date of Office Visit:  6/7/2022  Date of Hospital Admission: 5/31/22  Date of Hospital Discharge: 6/2/22  Risk of hospital readmission (high >=14%. Medium >=10%) :Readmission Risk Score: 13.1 ( )      Care management risk score Rising risk (score 2-5) and Complex Care (Scores >=6): 2     Non face to face  following discharge, date last encounter closed (first attempt may have been earlier): 5/27/2022  2:48 PM    Call initiated 2 business days of discharge: Yes    ASSESSMENT/PLAN:   Hospital discharge follow-up  -     DC DISCHARGE MEDS RECONCILED W/ CURRENT OUTPATIENT MED LIST  New onset a-fib ([address]  Atrial fibrillation with rapid ventricular response (HCC)  Malignant neoplasm of upper lobe of right lung (HCC)  S/P thoracotomy    Rate is now controlled. Continue med changes as prescribed and f/u with cardiology as scheduled    Medical Decision Making: high complexity  Return in about 3 months (around 9/7/2022). Subjective:   HPI:  Follow up of Hospital problems/diagnosis(es):   Principal Problem:    New onset atrial fibrillation ([address]  Active Problems:    Atrial fibrillation with RVR (HCC)    Smoking history    Squamous cell carcinoma of right lung (HCC)    S/P lobectomy of lung  Resolved Problems:    * No resolved hospital problems. *    Inpatient course: Discharge summary reviewed- see chart. [address]  COURSE  AND  TREATMENT:     Hospital stay patient was seen by thoracic surgery and cardiology. He was treated with Cardizem as well as metoprolol XL and his A. fib resolved he is currently in normal sinus rhythm. As per cardiology plan for CURTIS and cardioversion within 2 to 3 weeks as outpatient. Patient will follow up with his cardiologist and cardiovascular surgery for reevaluation. His medications have been adjusted per cardiology service.   1. Increase Cardizem CD1 20 mg p.o. twice daily  2. Increase Toprol-XL to 50 mg p.o. every morning and 25 mg p.o. every afternoon  3. Continue Eliquis 5 mg p.o. twice daily     Patient verbalized understanding discharge and follow-up instructions all questions answered.     History Of Present Illness: The patient is a 76 y. o. male who presented to [facility] ER with PMH CAD, CABG, HTN, hyperlipidemia, GERD, COPD, former smoker, skin cancer to nose, complaining of irregular heart rate. Patient was recently hospitalized on 5/23 due to right upper lobe lung mass.  Found to have squamous cell carcinoma with evidence of distant metastatic disease on PET /CT scan.  While admitted he had a minimally invasive robotic assisted right upper lobectomy with regional lymph node dissection by Dr. Marsh Bees was discharged in stable condition on 5/25.   Today patient presented to PCP for hospital follow-up. Edvalerie Wilder states that he has had fatigue and shortness of breath since discharge.  While at PCP office patient found to have heart rate ranging from 140-180 and new onset atrial fibrillation.  Denies palpitation, lightheadedness, dizziness, and chest pain.  Patient was instructed to [facility] ER for further evaluation.  Work-up in ER CXR no acute cardiopulmonary process, troponin negative, FNA 3706, TSH with reflex 2.91, urinalysis negative, and COVID-negative.  Received digoxin 250 MCG IV, diltiazem 12.5 mg IV while in ER.  Patient is to be admitted to the hospitalist service with consultation to cardiology due to new onset atrial fibrillation.  Courtesy consult to cardiothoracic surgery from recent lobectomy. Interval history/Current status: he is feeling better. Wants to do more but still recovering from the thoracotomy . Denies chest pain. Still sore on the right side from lung surgery.      Patient Active Problem List   Diagnosis    Primary insomnia    ABHINAV (generalized anxiety disorder)    Essential hypertension    Chronic tension-type headache, not intractable    Acute ST elevation myocardial infarction (STEMI) of anteroseptal wall (HCC)    Heart attack [facility]    Lung nodule    Smoking history    Hx of CABG    History of MI (myocardial infarction)    Squamous cell carcinoma of right lung (HCC)    Lung mass    New onset atrial fibrillation ([facility]    S/P lobectomy of lung    Atrial fibrillation with RVR ([facility]       Medications listed as ordered at the time of discharge from hospital     Medication List          Accurate as of June 7, 2022 10:44 AM. If you have any questions, ask your nurse or doctor. CHANGE how you take these medications    clonazePAM 1 MG tablet  Commonly known as: KlonoPIN  Take 1 tablet by mouth 3 times daily as needed for Anxiety for up to 30 days.   What changed: when to take this     Entresto 24-26 MG per tablet  Generic drug: sacubitril-valsartan  Take 1 tablet by mouth 2 times daily Pt did not have bottle to verify dose  What changed: additional instructions     magnesium gluconate 500 MG tablet  Commonly known as: MAGONATE  Take 0.5 tablets by mouth daily  What changed: additional instructions        CONTINUE taking these medications    apixaban 5 MG Tabs tablet  Commonly known as: ELIQUIS  Take 1 tablet by mouth 2 times daily     aspirin 81 MG EC tablet  Take 1 tablet by mouth daily     atorvastatin 40 MG tablet  Commonly known as: LIPITOR     butalbital-acetaminophen-caffeine -40 MG per tablet  Commonly known as: FIORICET, ESGIC  Take 1 tablet by mouth every 6 hours as needed for Headaches     clopidogrel 75 MG tablet  Commonly known as: PLAVIX  Take 1 tablet by mouth daily     dilTIAZem 120 MG extended release capsule  Commonly known as: CARDIZEM CD  Take 1 capsule by mouth in the morning and at bedtime     esomeprazole 40 MG delayed release capsule  Commonly known as: NexIUM  Take 1 capsule by mouth daily     furosemide 40 MG tablet  Commonly known as: LASIX  Take 1 tablet by mouth daily     melatonin 3 mg Tabs tablet  Take 1 tablet by mouth nightly as needed (sleep)     * metoprolol succinate 25 MG extended release tablet  Commonly known as: TOPROL XL  Take 1 tablet by mouth at bedtime for 30 doses     * metoprolol succinate 50 MG extended release tablet  Commonly known as: TOPROL XL  Take 1 tablet by mouth every morning     oxyCODONE-acetaminophen  MG per tablet  Commonly known as: PERCOCET     * Rollator Ultra-Light Misc  Use to walk     * Pulse Oximeter For Finger Misc  Use as directed     spironolactone 25 MG tablet  Commonly known as: ALDACTONE  Take 1 tablet by mouth daily         * This list has 4 medication(s) that are the same as other medications prescribed for you. Read the directions carefully, and ask your doctor or other care provider to review them with you. Medications marked \"taking\" at this time  Outpatient Medications Marked as Taking for the 6/7/22 encounter (Office Visit) with CAPO Chery   Medication Sig Dispense Refill    Misc. Devices (PULSE OXIMETER FOR FINGER) MISC Use as directed 1 each 0    apixaban (ELIQUIS) 5 MG TABS tablet Take 1 tablet by mouth 2 times daily 60 tablet 1    dilTIAZem (CARDIZEM CD) 120 MG extended release capsule Take 1 capsule by mouth in the morning and at bedtime 60 capsule 1    metoprolol succinate (TOPROL XL) 25 MG extended release tablet Take 1 tablet by mouth at bedtime for 30 doses 30 tablet 1    metoprolol succinate (TOPROL XL) 50 MG extended release tablet Take 1 tablet by mouth every morning 30 tablet 1    clonazePAM (KLONOPIN) 1 MG tablet Take 1 tablet by mouth 3 times daily as needed for Anxiety for up to 30 days.  (Patient taking differently: Take 1 mg by mouth nightly. ) 90 tablet 0    butalbital-acetaminophen-caffeine (FIORICET, ESGIC) -40 MG per tablet Take 1 tablet by mouth every 6 hours as needed for Headaches 120 tablet 0    oxyCODONE-acetaminophen (PERCOCET)  MG per tablet Take 0.5 tablets by mouth as needed for Pain. Half tab  For pain from surgery      atorvastatin (LIPITOR) 40 MG tablet Take 40 mg by mouth nightly      sacubitril-valsartan (ENTRESTO) 24-26 MG per tablet Take 1 tablet by mouth 2 times daily Pt did not have bottle to verify dose (Patient taking differently: Take 1 tablet by mouth 2 times daily ) 60 tablet 5    furosemide (LASIX) 40 MG tablet Take 1 tablet by mouth daily 30 tablet 3    spironolactone (ALDACTONE) 25 MG tablet Take 1 tablet by mouth daily 30 tablet 3    melatonin 3 mg TABS tablet Take 1 tablet by mouth nightly as needed (sleep) 30 tablet 3    clopidogrel (PLAVIX) 75 MG tablet Take 1 tablet by mouth daily 30 tablet 3    aspirin 81 MG EC tablet Take 1 tablet by mouth daily 30 tablet 3    magnesium gluconate (MAGONATE) 500 MG tablet Take 0.5 tablets by mouth daily (Patient taking differently: Take 250 mg by mouth daily Half tab (250mg))      esomeprazole (NEXIUM) 40 MG delayed release capsule Take 1 capsule by mouth daily 30 capsule 11    Misc. Devices (ROLLATOR ULTRA-LIGHT) MISC Use to walk 1 each 0        Medications patient taking as of now reconciled against medications ordered at time of hospital discharge: Yes    A comprehensive review of systems was negative except for what was noted in the HPI.     Objective:    /68 (Site: Left Upper Arm, Position: Sitting, Cuff Size: Small Adult)   Pulse 76   Temp 96.8 °F (36 °C) (Temporal)   Wt 167 lb (75.8 kg)   SpO2 95%   BMI 23.96 kg/m²     General Appearance: alert and oriented to person, place and time, well developed and well- nourished, in no acute distress  Skin: warm and dry, no rash or erythema  Head: normocephalic and atraumatic  Eyes: pupils equal, round, and reactive to light, extraocular eye movements intact, conjunctivae normal  ENT: tympanic membrane, external ear and ear canal normal bilaterally, nose without deformity, nasal mucosa and turbinates normal without polyps  Neck: supple and non-tender without mass, no thyromegaly or thyroid nodules, no cervical lymphadenopathy  Pulmonary/Chest: clear to auscultation bilaterally- no wheezes, rales or rhonchi, normal air movement, no respiratory distress  Cardiovascular: normal rate, irregular rhythm, normal S1 and S2, no murmurs, rubs, clicks, or gallops, distal pulses intact, no carotid bruits  Abdomen: soft, non-tender, non-distended, normal bowel sounds, no masses or organomegaly  Extremities: no cyanosis, clubbing or edema  Musculoskeletal: normal range of motion, no joint swelling, deformity or tenderness  Neurologic: reflexes normal and symmetric, no cranial nerve deficit, gait, coordination and speech normal      An electronic signature was used to authenticate this note.   --Catina Flores, APRN

## 2022-06-08 ENCOUNTER — OFFICE VISIT (OUTPATIENT)
Dept: CARDIOTHORACIC SURGERY | Age: 67
End: 2022-06-08

## 2022-06-08 VITALS
BODY MASS INDEX: 23.99 KG/M2 | DIASTOLIC BLOOD PRESSURE: 78 MMHG | HEART RATE: 78 BPM | SYSTOLIC BLOOD PRESSURE: 100 MMHG | WEIGHT: 167.6 LBS | RESPIRATION RATE: 22 BRPM | HEIGHT: 70 IN | OXYGEN SATURATION: 95 %

## 2022-06-08 DIAGNOSIS — Z90.2 S/P LOBECTOMY OF LUNG: Primary | ICD-10-CM

## 2022-06-08 PROCEDURE — 99024 POSTOP FOLLOW-UP VISIT: CPT | Performed by: SURGERY

## 2022-06-08 ASSESSMENT — ENCOUNTER SYMPTOMS
DIARRHEA: 0
BACK PAIN: 0
SHORTNESS OF BREATH: 0
ABDOMINAL PAIN: 0
CONSTIPATION: 0
VOMITING: 0
COUGH: 0
NAUSEA: 0
CHEST TIGHTNESS: 0

## 2022-06-08 NOTE — PROGRESS NOTES
Subjective:      Patient ID: Lucas Amaya is a 79 y.o. male. HPI  Patient underwent RULobectomy and had a smooth postop course. He was readmitted with afib but then discharged soon thereafter. No complaints at this point. Review of Systems   Constitutional: Negative for activity change, appetite change, chills, diaphoresis, fatigue, fever and unexpected weight change. HENT: Negative for dental problem. Eyes: Negative for visual disturbance. Respiratory: Negative for cough, chest tightness and shortness of breath. No hoarseness. No hemoptysis. Cardiovascular: Negative for chest pain, palpitations and leg swelling. No orthopnea or PND. Gastrointestinal: Negative for abdominal pain, constipation, diarrhea, nausea and vomiting. Genitourinary: Negative for dysuria, frequency, hematuria and urgency. Musculoskeletal: Negative for back pain and joint swelling. Skin: Negative. Neurological: Negative for dizziness, seizures, syncope, light-headedness and headaches. Psychiatric/Behavioral: Negative for confusion and hallucinations. The patient is not nervous/anxious. Objective:   Physical Exam  HENT:      Head: Normocephalic. Right Ear: Tympanic membrane normal.      Nose: Nose normal.      Mouth/Throat:      Mouth: Mucous membranes are moist.   Cardiovascular:      Rate and Rhythm: Normal rate and regular rhythm. Pulses: Normal pulses. Pulmonary:      Breath sounds: Normal breath sounds. Abdominal:      General: Abdomen is flat. Musculoskeletal:         General: Normal range of motion. Skin:     General: Skin is warm. Capillary Refill: Capillary refill takes less than 2 seconds. Neurological:      General: No focal deficit present. Mental Status: He is alert and oriented to person, place, and time. Psychiatric:         Behavior: Behavior normal.         Assessment:      Doing well s/p robotic RULobectomy. Currently NSR.   Further f/u of afib by primary MD.      Plan:      No further thoracic surgery f/u required.         Vaishnavi Hernandez MD

## 2022-07-14 ENCOUNTER — OFFICE VISIT (OUTPATIENT)
Dept: ENT CLINIC | Age: 67
End: 2022-07-14
Payer: MEDICARE

## 2022-07-14 VITALS
HEIGHT: 70 IN | DIASTOLIC BLOOD PRESSURE: 72 MMHG | WEIGHT: 170 LBS | BODY MASS INDEX: 24.34 KG/M2 | SYSTOLIC BLOOD PRESSURE: 110 MMHG

## 2022-07-14 DIAGNOSIS — C44.301 SKIN CANCER OF NOSE: Primary | ICD-10-CM

## 2022-07-14 DIAGNOSIS — M95.0 NASAL DEFORMITY, ACQUIRED: ICD-10-CM

## 2022-07-14 PROCEDURE — 1123F ACP DISCUSS/DSCN MKR DOCD: CPT | Performed by: OTOLARYNGOLOGY

## 2022-07-14 PROCEDURE — 99213 OFFICE O/P EST LOW 20 MIN: CPT | Performed by: OTOLARYNGOLOGY

## 2022-07-14 ASSESSMENT — ENCOUNTER SYMPTOMS
RESPIRATORY NEGATIVE: 1
ALLERGIC/IMMUNOLOGIC NEGATIVE: 1
GASTROINTESTINAL NEGATIVE: 1
EYES NEGATIVE: 1

## 2022-08-01 RX ORDER — METOPROLOL SUCCINATE 25 MG/1
TABLET, EXTENDED RELEASE ORAL
Qty: 60 TABLET | Refills: 0 | OUTPATIENT
Start: 2022-08-01

## 2022-08-01 RX ORDER — DILTIAZEM HYDROCHLORIDE 120 MG/1
120 CAPSULE, COATED, EXTENDED RELEASE ORAL 2 TIMES DAILY
Qty: 120 CAPSULE | Refills: 0 | OUTPATIENT
Start: 2022-08-01 | End: 2022-08-31

## 2022-08-01 RX ORDER — METOPROLOL SUCCINATE 50 MG/1
TABLET, EXTENDED RELEASE ORAL
Qty: 60 TABLET | Refills: 0 | OUTPATIENT
Start: 2022-08-01

## 2022-09-12 ENCOUNTER — OFFICE VISIT (OUTPATIENT)
Dept: PRIMARY CARE CLINIC | Age: 67
End: 2022-09-12
Payer: MEDICARE

## 2022-09-12 VITALS
WEIGHT: 174 LBS | HEART RATE: 67 BPM | SYSTOLIC BLOOD PRESSURE: 80 MMHG | TEMPERATURE: 97.2 F | DIASTOLIC BLOOD PRESSURE: 55 MMHG | BODY MASS INDEX: 24.97 KG/M2 | OXYGEN SATURATION: 92 %

## 2022-09-12 DIAGNOSIS — F41.1 GAD (GENERALIZED ANXIETY DISORDER): ICD-10-CM

## 2022-09-12 DIAGNOSIS — G44.229 CHRONIC TENSION-TYPE HEADACHE, NOT INTRACTABLE: ICD-10-CM

## 2022-09-12 DIAGNOSIS — F51.01 PRIMARY INSOMNIA: ICD-10-CM

## 2022-09-12 DIAGNOSIS — K21.9 GASTROESOPHAGEAL REFLUX DISEASE WITHOUT ESOPHAGITIS: ICD-10-CM

## 2022-09-12 PROCEDURE — 99214 OFFICE O/P EST MOD 30 MIN: CPT | Performed by: NURSE PRACTITIONER

## 2022-09-12 PROCEDURE — 1123F ACP DISCUSS/DSCN MKR DOCD: CPT | Performed by: NURSE PRACTITIONER

## 2022-09-12 RX ORDER — BUTALBITAL, ACETAMINOPHEN AND CAFFEINE 50; 325; 40 MG/1; MG/1; MG/1
1 TABLET ORAL EVERY 6 HOURS PRN
Qty: 120 TABLET | Refills: 0 | Status: SHIPPED | OUTPATIENT
Start: 2022-09-12

## 2022-09-12 RX ORDER — CLONAZEPAM 1 MG/1
1 TABLET ORAL 3 TIMES DAILY PRN
Qty: 90 TABLET | Refills: 0 | Status: SHIPPED | OUTPATIENT
Start: 2022-09-12 | End: 2022-10-12

## 2022-09-12 RX ORDER — ESOMEPRAZOLE MAGNESIUM 40 MG/1
40 CAPSULE, DELAYED RELEASE ORAL DAILY
Qty: 30 CAPSULE | Refills: 11 | Status: SHIPPED | OUTPATIENT
Start: 2022-09-12

## 2022-09-12 ASSESSMENT — ENCOUNTER SYMPTOMS
EYE REDNESS: 0
CONSTIPATION: 0
EYE DISCHARGE: 0
SORE THROAT: 0
BLOOD IN STOOL: 0
COUGH: 0
DIARRHEA: 0
CHOKING: 0
WHEEZING: 0
RHINORRHEA: 0

## 2022-09-12 ASSESSMENT — PATIENT HEALTH QUESTIONNAIRE - PHQ9
1. LITTLE INTEREST OR PLEASURE IN DOING THINGS: 0
SUM OF ALL RESPONSES TO PHQ QUESTIONS 1-9: 0
SUM OF ALL RESPONSES TO PHQ9 QUESTIONS 1 & 2: 0
SUM OF ALL RESPONSES TO PHQ QUESTIONS 1-9: 0
2. FEELING DOWN, DEPRESSED OR HOPELESS: 0
SUM OF ALL RESPONSES TO PHQ QUESTIONS 1-9: 0
SUM OF ALL RESPONSES TO PHQ QUESTIONS 1-9: 0

## 2022-09-12 NOTE — PROGRESS NOTES
Panda Nava (:  1955) is a 79 y.o. male,Established patient, here for evaluation of the following chief complaint(s):  3 Month Follow-Up (Patient still in AFIB, he gets his cardioversion tomorrow.  )      ASSESSMENT/PLAN:    ICD-10-CM    1. Chronic tension-type headache, not intractable  G44.229 butalbital-acetaminophen-caffeine (FIORICET, ESGIC) -40 MG per tablet      2. Primary insomnia  F51.01 clonazePAM (KLONOPIN) 1 MG tablet      3. ABHINAV (generalized anxiety disorder)  F41.1 clonazePAM (KLONOPIN) 1 MG tablet      4. Gastroesophageal reflux disease without esophagitis  K21.9 esomeprazole (NEXIUM) 40 MG delayed release capsule          Return in about 3 months (around 2022). SUBJECTIVE/OBJECTIVE:  HPI  Headaches  fiorecet helps. He will have somedays and not have a HA and then some days it will hurt all day. Denies imaging and referral to neurology     Insomnia. This has improved with the klonopin. He is taking it at night. It is working to help him sleep   He is needing a refill on this medication    Reflux. Controlled on nexium. Review of Systems   Constitutional:  Negative for appetite change and unexpected weight change. HENT:  Negative for congestion, ear pain, rhinorrhea and sore throat. Eyes:  Negative for discharge and redness. Respiratory:  Negative for cough, choking and wheezing. Cardiovascular:  Negative for chest pain. Gastrointestinal:  Negative for blood in stool, constipation and diarrhea. Genitourinary:  Negative for decreased urine volume and dysuria. Musculoskeletal:  Positive for arthralgias. Skin:  Negative for rash. Neurological:  Positive for headaches. Negative for weakness. Hematological:  Negative for adenopathy. Psychiatric/Behavioral:  Positive for sleep disturbance. Negative for suicidal ideas.       BP (!) 80/55 (Site: Left Upper Arm, Position: Sitting, Cuff Size: Large Adult)   Pulse 67   Temp 97.2 °F (36.2 °C) (Temporal)   Wt 174 lb (78.9 kg)   SpO2 92%   BMI 24.97 kg/m²    Physical Exam  Vitals reviewed. Constitutional:       Appearance: He is well-developed. HENT:      Head: Normocephalic. Nose: Nasal deformity (septum removed, wears bandage over nose) present. Mouth/Throat:      Dentition: Abnormal dentition. Dental caries present. Eyes:      Conjunctiva/sclera: Conjunctivae normal.   Cardiovascular:      Rate and Rhythm: Normal rate and regular rhythm. Heart sounds: Normal heart sounds. Pulmonary:      Effort: Pulmonary effort is normal.      Breath sounds: Normal breath sounds. No wheezing or rales. Abdominal:      General: Bowel sounds are normal.      Palpations: Abdomen is soft. Tenderness: There is no abdominal tenderness. Musculoskeletal:      Cervical back: Normal range of motion and neck supple. Skin:     General: Skin is warm and dry. Comments: Multiple AK on arms, hands,ears, and face   Neurological:      Mental Status: He is alert and oriented to person, place, and time. Psychiatric:         Behavior: Behavior normal.               An electronic signature was used to authenticate this note.     --CAPO Rowe

## 2022-10-18 ENCOUNTER — TELEPHONE (OUTPATIENT)
Dept: PRIMARY CARE CLINIC | Age: 67
End: 2022-10-18

## 2022-10-18 NOTE — TELEPHONE ENCOUNTER
Called patient to schedule overdue colorectal cancer screening. Patient refused, stated he has been dealing with cancer and does not want anymore screenings.

## 2022-10-21 ENCOUNTER — TELEPHONE (OUTPATIENT)
Dept: PRIMARY CARE CLINIC | Age: 67
End: 2022-10-21

## 2022-11-14 ENCOUNTER — OFFICE VISIT (OUTPATIENT)
Dept: PRIMARY CARE CLINIC | Age: 67
End: 2022-11-14
Payer: MEDICARE

## 2022-11-14 VITALS
HEIGHT: 70 IN | HEART RATE: 88 BPM | WEIGHT: 170 LBS | DIASTOLIC BLOOD PRESSURE: 62 MMHG | TEMPERATURE: 96.7 F | BODY MASS INDEX: 24.34 KG/M2 | SYSTOLIC BLOOD PRESSURE: 90 MMHG | OXYGEN SATURATION: 96 %

## 2022-11-14 DIAGNOSIS — G43.909 MIGRAINE WITHOUT STATUS MIGRAINOSUS, NOT INTRACTABLE, UNSPECIFIED MIGRAINE TYPE: Primary | ICD-10-CM

## 2022-11-14 DIAGNOSIS — F41.1 GAD (GENERALIZED ANXIETY DISORDER): ICD-10-CM

## 2022-11-14 DIAGNOSIS — G44.229 CHRONIC TENSION-TYPE HEADACHE, NOT INTRACTABLE: ICD-10-CM

## 2022-11-14 DIAGNOSIS — J01.00 ACUTE MAXILLARY SINUSITIS, RECURRENCE NOT SPECIFIED: ICD-10-CM

## 2022-11-14 DIAGNOSIS — F51.01 PRIMARY INSOMNIA: ICD-10-CM

## 2022-11-14 PROCEDURE — 3074F SYST BP LT 130 MM HG: CPT | Performed by: NURSE PRACTITIONER

## 2022-11-14 PROCEDURE — 1123F ACP DISCUSS/DSCN MKR DOCD: CPT | Performed by: NURSE PRACTITIONER

## 2022-11-14 PROCEDURE — 99214 OFFICE O/P EST MOD 30 MIN: CPT | Performed by: NURSE PRACTITIONER

## 2022-11-14 PROCEDURE — 3078F DIAST BP <80 MM HG: CPT | Performed by: NURSE PRACTITIONER

## 2022-11-14 RX ORDER — CLONAZEPAM 1 MG/1
1 TABLET ORAL 3 TIMES DAILY PRN
Qty: 90 TABLET | Refills: 0 | Status: SHIPPED | OUTPATIENT
Start: 2022-11-14 | End: 2022-12-14

## 2022-11-14 RX ORDER — AMOXICILLIN AND CLAVULANATE POTASSIUM 875; 125 MG/1; MG/1
1 TABLET, FILM COATED ORAL 2 TIMES DAILY
Qty: 20 TABLET | Refills: 0 | Status: SHIPPED | OUTPATIENT
Start: 2022-11-14 | End: 2022-11-24

## 2022-11-14 RX ORDER — BUTALBITAL, ACETAMINOPHEN AND CAFFEINE 50; 325; 40 MG/1; MG/1; MG/1
1 TABLET ORAL EVERY 6 HOURS PRN
Qty: 120 TABLET | Refills: 0 | Status: SHIPPED | OUTPATIENT
Start: 2022-11-14

## 2022-11-14 RX ORDER — UBROGEPANT 100 MG/1
TABLET ORAL
Qty: 16 TABLET | Refills: 5 | Status: SHIPPED | OUTPATIENT
Start: 2022-11-14

## 2022-11-14 ASSESSMENT — ENCOUNTER SYMPTOMS
RHINORRHEA: 0
EYE DISCHARGE: 0
BLOOD IN STOOL: 0
SINUS PAIN: 1
WHEEZING: 0
SINUS PRESSURE: 1
SORE THROAT: 0
EYE REDNESS: 0
COUGH: 0
CONSTIPATION: 0
DIARRHEA: 0
CHOKING: 0

## 2022-11-14 NOTE — PROGRESS NOTES
Andrew Bey (:  1955) is a 79 y.o. male,Established patient, here for evaluation of the following chief complaint(s):  3 Month Follow-Up (Patient states he is having a lot of headaches and nose bleeds. Headache medication is not working. )      ASSESSMENT/PLAN:    ICD-10-CM    1. Migraine without status migrainosus, not intractable, unspecified migraine type  G43.909 Ubrogepant (UBRELVY) 100 MG TABS      2. Primary insomnia  F51.01 clonazePAM (KLONOPIN) 1 MG tablet      3. ABHINAV (generalized anxiety disorder)  F41.1 clonazePAM (KLONOPIN) 1 MG tablet      4. Chronic tension-type headache, not intractable  G44.229 butalbital-acetaminophen-caffeine (FIORICET, ESGIC) -40 MG per tablet      5. Acute maxillary sinusitis, recurrence not specified  J01.00 amoxicillin-clavulanate (AUGMENTIN) 875-125 MG per tablet          Return in about 3 months (around 2023) for awv. SUBJECTIVE/OBJECTIVE:  HPI  Headaches  fiorecet helps. He will have somedays and not have a HA and then some days it will hurt all day. Worse since having all of the sinus pressure after having covid. Denies imaging and referral to neurology     Insomnia. This has improved with the klonopin. He is taking it at night. It is working to help him sleep   He is needing a refill on this medication    Review of Systems   Constitutional:  Negative for appetite change and unexpected weight change. HENT:  Positive for sinus pressure and sinus pain. Negative for congestion, ear pain, rhinorrhea and sore throat. Eyes:  Negative for discharge and redness. Respiratory:  Negative for cough, choking and wheezing. Cardiovascular:  Negative for chest pain. Gastrointestinal:  Negative for blood in stool, constipation and diarrhea. Genitourinary:  Negative for decreased urine volume and dysuria. Skin:  Negative for rash. Neurological:  Positive for headaches. Negative for weakness. Hematological:  Negative for adenopathy. Psychiatric/Behavioral:  Negative for suicidal ideas. BP 90/62 (Site: Right Upper Arm, Position: Sitting, Cuff Size: Large Adult)   Pulse 88   Temp (!) 96.7 °F (35.9 °C) (Temporal)   Ht 5' 10\" (1.778 m)   Wt 170 lb (77.1 kg)   SpO2 96%   BMI 24.39 kg/m²    Physical Exam  Vitals reviewed. Constitutional:       Appearance: He is well-developed. HENT:      Head: Normocephalic. Nose: Nasal deformity (septum removed, wears bandage over nose) present. Mouth/Throat:      Dentition: Abnormal dentition. Dental caries present. Eyes:      Conjunctiva/sclera: Conjunctivae normal.   Cardiovascular:      Rate and Rhythm: Normal rate and regular rhythm. Heart sounds: Normal heart sounds. Pulmonary:      Effort: Pulmonary effort is normal.      Breath sounds: Normal breath sounds. No wheezing or rales. Abdominal:      General: Bowel sounds are normal.      Palpations: Abdomen is soft. Tenderness: There is no abdominal tenderness. Musculoskeletal:      Cervical back: Normal range of motion and neck supple. Skin:     General: Skin is warm and dry. Comments: Multiple AK on arms, hands,ears, and face   Neurological:      Mental Status: He is alert and oriented to person, place, and time. Psychiatric:         Behavior: Behavior normal.               An electronic signature was used to authenticate this note.     --CAPO Downey

## 2023-01-20 ENCOUNTER — TELEPHONE (OUTPATIENT)
Dept: FAMILY MEDICINE CLINIC | Age: 68
End: 2023-01-20

## 2023-02-22 NOTE — TELEPHONE ENCOUNTER
Closing encounter SUBJECTIVE:                                                      Peter White is a 2 month old male, here for a routine health maintenance visit.    Patient was roomed by: Tanja Horta    [facility] Child     Social History  Patient accompanied by:  Mother  Questions or concerns?: YES (rash on face)    Forms to complete? YES  Child lives with::  Mother, father and sisters  Languages spoken in the home:  [place]  Recent family changes/ special stressors?:  Recent birth of a baby    Safety / Health Risk  Is your child around anyone who smokes?  No    TB Exposure:     No TB exposure    Car seat < 6 years old, in  back seat, rear-facing, 5-point restraint? Yes    Home Safety Survey:      Firearms in the home?: No      Hearing / Vision  Hearing or vision concerns?  No concerns, hearing and vision subjectively normal    Daily Activities    Water source:  City water  Nutrition:  Breastmilk and formula  Breastfeeding concerns?  None, breastfeeding going well; no concerns  Formula:  Simiilac  Vitamins & Supplements:  No    Elimination       Urinary frequency:1-3 times per 24 hours     Stool frequency: 1-3 times per 24 hours     Stool consistency: soft     Elimination problems:  None    Sleep      Sleep arrangement:crib    Sleep position:  On back    Sleep pattern: wakes at night for feedings        BIRTH HISTORY   metabolic screening: Results not known at this time--FAX request to [facility] at [phone]    =======================================    DEVELOPMENT  Milestones (by observation/ exam/ report. 75-90% ile):     PERSONAL/ SOCIAL/COGNITIVE:    Regards face    Smiles responsively   LANGUAGE:    Vocalizes    Responds to sound  GROSS MOTOR:    Lift head when prone    Kicks / equal movements  FINE MOTOR/ ADAPTIVE:    Eyes follow past midline    Reflexive grasp    PROBLEM LIST  There is no problem list on file for this patient.    MEDICATIONS  No current outpatient prescriptions on file.      ALLERGY  Not on  "File    IMMUNIZATIONS  Immunization History   Administered Date(s) Administered     HepB, Unspecified 2017       HEALTH HISTORY SINCE LAST VISIT  No surgery, major illness or injury since last physical exam    ROS  GENERAL: See health history, nutrition and daily activities   SKIN:  Rashes on face and on trunk - skin is dry  HEENT: Hearing/vision: see above.  No eye, nasal, ear concerns  RESP: No cough or other concerns  CV: No concerns  GI: See nutrition and elimination. No concerns.  : See elimination. No concerns  NEURO: See development    OBJECTIVE:   EXAM  Pulse 148  Temp 97.1  F (36.2  C) (Rectal)  Ht 2' 0.02\" (0.61 m)  Wt 11 lb 14 oz (5.386 kg)  HC 15.87\" (40.3 cm)  BMI 14.48 kg/m2  82 %ile based on WHO (Boys, 0-2 years) length-for-age data using vitals from 3/1/2018.  29 %ile based on WHO (Boys, 0-2 years) weight-for-age data using vitals from 3/1/2018.  76 %ile based on WHO (Boys, 0-2 years) head circumference-for-age data using vitals from 3/1/2018.  GENERAL: Active, alert, in no acute distress.  SKIN: dry tough patches and generally dry skin on cheeks and on trunk  HEAD: Normocephalic. Normal fontanels and sutures.  EYES: Conjunctivae and cornea normal. Red reflexes present bilaterally.  EARS: Normal canals. Tympanic membranes are normal; gray and translucent.  NOSE: Normal without discharge.  MOUTH/THROAT: Clear. No oral lesions.  NECK: Supple, no masses.  LYMPH NODES: No adenopathy  LUNGS: Clear. No rales, rhonchi, wheezing or retractions  HEART: Regular rhythm. Normal S1/S2. No murmurs. Normal femoral pulses.  ABDOMEN: Soft, non-tender, not distended, no masses or hepatosplenomegaly. Normal umbilicus and bowel sounds.   GENITALIA: Normal male external genitalia. Marcelo stage I,  Testes descended bilateraly, no hernia or hydrocele.    EXTREMITIES: Hips normal with negative Ortolani and Edouard. Symmetric creases and  no deformities  NEUROLOGIC: Normal tone throughout. Normal reflexes for " age    ASSESSMENT/PLAN:   (Z00.129) Encounter for routine child health examination w/o abnormal findings  (primary encounter diagnosis)  Plan: Screening Questionnaire for Immunizations, DTAP        - HIB - IPV VACCINE, IM USE (Pentacel) [97744],        HEPATITIS B VACCINE,PED/ADOL,IM [58321],         PNEUMOCOCCAL CONJ VACCINE 13 VALENT IM [54092],        ROTAVIRUS VACC 2 DOSE ORAL, VACCINE         ADMINISTRATION, INITIAL, VACCINE         ADMINISTRATION, EACH ADDITIONAL,         cholecalciferol (VITAMIN D/D-VI-SOL) 400         UNIT/ML LIQD liquid        Normal growth and development.      (L20.83) Infantile atopic dermatitis  Plan: hydrocortisone 2.5 % cream        Discussed chronic nature of atopic dermatitis.  Discussed the use of mild hypoallergenic soaps and hypoallergenic skin moisturizers.  Discussed the use of steroid creams to treat atopic dermatitis. Discussed use of bleach in bath water to treat eczema.        Anticipatory Guidance  The following topics were discussed:  SOCIAL/ FAMILY    calming techniques    talk or sing to baby/ music  NUTRITION:    delay solid food    no honey before one year    vit D if breastfeeding  HEALTH/ SAFETY:    car seat    falls    safe crib    never jerk - shake    Preventive Care Plan  Immunizations     I provided face to face vaccine counseling, answered questions, and explained the benefits and risks of the vaccine components ordered today including:  [id number] (Pentacel ), Hep B - Pediatric, Pneumococcal 13-valent Conjugate (Prevnar ) and Rotavirus  Referrals/Ongoing Specialty care: No   See other orders in EpicCare    FOLLOW-UP:    4 month Preventive Care visit    CHASE MELENDEZ MD  [facility]

## 2023-03-27 ENCOUNTER — OFFICE VISIT (OUTPATIENT)
Dept: FAMILY MEDICINE CLINIC | Age: 68
End: 2023-03-27
Payer: MEDICARE

## 2023-03-27 VITALS
TEMPERATURE: 97.5 F | DIASTOLIC BLOOD PRESSURE: 62 MMHG | BODY MASS INDEX: 24.73 KG/M2 | WEIGHT: 172.75 LBS | HEIGHT: 70 IN | HEART RATE: 55 BPM | OXYGEN SATURATION: 97 % | SYSTOLIC BLOOD PRESSURE: 92 MMHG

## 2023-03-27 DIAGNOSIS — G43.909 MIGRAINE WITHOUT STATUS MIGRAINOSUS, NOT INTRACTABLE, UNSPECIFIED MIGRAINE TYPE: ICD-10-CM

## 2023-03-27 DIAGNOSIS — R73.09 ELEVATED GLUCOSE: ICD-10-CM

## 2023-03-27 DIAGNOSIS — I25.10 CORONARY ARTERY DISEASE INVOLVING NATIVE CORONARY ARTERY OF NATIVE HEART WITHOUT ANGINA PECTORIS: ICD-10-CM

## 2023-03-27 DIAGNOSIS — Z12.5 SCREENING PSA (PROSTATE SPECIFIC ANTIGEN): ICD-10-CM

## 2023-03-27 DIAGNOSIS — G44.229 CHRONIC TENSION-TYPE HEADACHE, NOT INTRACTABLE: ICD-10-CM

## 2023-03-27 DIAGNOSIS — F41.1 GAD (GENERALIZED ANXIETY DISORDER): ICD-10-CM

## 2023-03-27 DIAGNOSIS — F51.01 PRIMARY INSOMNIA: ICD-10-CM

## 2023-03-27 DIAGNOSIS — Z00.00 MEDICARE ANNUAL WELLNESS VISIT, SUBSEQUENT: Primary | ICD-10-CM

## 2023-03-27 DIAGNOSIS — Z79.899 MEDICATION MANAGEMENT: ICD-10-CM

## 2023-03-27 DIAGNOSIS — I10 PRIMARY HYPERTENSION: ICD-10-CM

## 2023-03-27 PROCEDURE — 3078F DIAST BP <80 MM HG: CPT | Performed by: NURSE PRACTITIONER

## 2023-03-27 PROCEDURE — G0439 PPPS, SUBSEQ VISIT: HCPCS | Performed by: NURSE PRACTITIONER

## 2023-03-27 PROCEDURE — 3074F SYST BP LT 130 MM HG: CPT | Performed by: NURSE PRACTITIONER

## 2023-03-27 PROCEDURE — 80305 DRUG TEST PRSMV DIR OPT OBS: CPT | Performed by: NURSE PRACTITIONER

## 2023-03-27 PROCEDURE — 1123F ACP DISCUSS/DSCN MKR DOCD: CPT | Performed by: NURSE PRACTITIONER

## 2023-03-27 RX ORDER — METHYLPREDNISOLONE 4 MG/1
TABLET ORAL
Qty: 1 KIT | Refills: 0 | Status: SHIPPED | OUTPATIENT
Start: 2023-03-27

## 2023-03-27 RX ORDER — BUTALBITAL, ACETAMINOPHEN AND CAFFEINE 50; 325; 40 MG/1; MG/1; MG/1
1 TABLET ORAL EVERY 6 HOURS PRN
Qty: 120 TABLET | Refills: 0 | Status: SHIPPED | OUTPATIENT
Start: 2023-03-27

## 2023-03-27 RX ORDER — DAPAGLIFLOZIN 10 MG/1
10 TABLET, FILM COATED ORAL DAILY
COMMUNITY
Start: 2023-02-27

## 2023-03-27 RX ORDER — UBROGEPANT 100 MG/1
TABLET ORAL
Qty: 16 TABLET | Refills: 5 | Status: SHIPPED | OUTPATIENT
Start: 2023-03-27

## 2023-03-27 RX ORDER — CLONAZEPAM 1 MG/1
1 TABLET ORAL 3 TIMES DAILY PRN
Qty: 90 TABLET | Refills: 0 | Status: SHIPPED | OUTPATIENT
Start: 2023-03-27 | End: 2023-04-26

## 2023-03-27 SDOH — ECONOMIC STABILITY: FOOD INSECURITY: WITHIN THE PAST 12 MONTHS, THE FOOD YOU BOUGHT JUST DIDN'T LAST AND YOU DIDN'T HAVE MONEY TO GET MORE.: NEVER TRUE

## 2023-03-27 SDOH — ECONOMIC STABILITY: INCOME INSECURITY: HOW HARD IS IT FOR YOU TO PAY FOR THE VERY BASICS LIKE FOOD, HOUSING, MEDICAL CARE, AND HEATING?: NOT HARD AT ALL

## 2023-03-27 SDOH — ECONOMIC STABILITY: FOOD INSECURITY: WITHIN THE PAST 12 MONTHS, YOU WORRIED THAT YOUR FOOD WOULD RUN OUT BEFORE YOU GOT MONEY TO BUY MORE.: NEVER TRUE

## 2023-03-27 SDOH — ECONOMIC STABILITY: HOUSING INSECURITY
IN THE LAST 12 MONTHS, WAS THERE A TIME WHEN YOU DID NOT HAVE A STEADY PLACE TO SLEEP OR SLEPT IN A SHELTER (INCLUDING NOW)?: NO

## 2023-03-27 ASSESSMENT — PATIENT HEALTH QUESTIONNAIRE - PHQ9
SUM OF ALL RESPONSES TO PHQ QUESTIONS 1-9: 0
SUM OF ALL RESPONSES TO PHQ QUESTIONS 1-9: 0
2. FEELING DOWN, DEPRESSED OR HOPELESS: 0
SUM OF ALL RESPONSES TO PHQ QUESTIONS 1-9: 0
SUM OF ALL RESPONSES TO PHQ9 QUESTIONS 1 & 2: 0
SUM OF ALL RESPONSES TO PHQ QUESTIONS 1-9: 0
1. LITTLE INTEREST OR PLEASURE IN DOING THINGS: 0

## 2023-03-27 ASSESSMENT — LIFESTYLE VARIABLES: HOW OFTEN DO YOU HAVE A DRINK CONTAINING ALCOHOL: NEVER

## 2023-03-27 NOTE — PATIENT INSTRUCTIONS

## 2023-03-27 NOTE — PROGRESS NOTES
by mouth daily  Lizabeth Rubin MD   metoprolol succinate (TOPROL XL) 25 MG extended release tablet Take 1 tablet by mouth at bedtime for 30 doses  Lizabeth Rubin MD       [facility] (Including outside providers/suppliers regularly involved in providing care):   Patient Care Team:  CAPO Sebastian as PCP - General (Family Nurse Practitioner)  CAPO Sebastian as PCP - Empaneled Provider     Reviewed and updated this visit:  Tobacco  Allergies  Meds  Problems  Med Hx  Surg Hx  Soc Hx  Fam Hx           Controlled Substance Monitoring:    Acute and Chronic Pain Monitoring:   RX Monitoring 3/27/2023   Attestation -   Periodic Controlled Substance Monitoring Possible medication side effects, risk of tolerance/dependence & alternative treatments discussed. ;No signs of potential drug abuse or diversion identified.    Chronic Pain > 80 MEDD -           CAPO Sebastian

## 2023-06-23 ENCOUNTER — OFFICE VISIT (OUTPATIENT)
Dept: FAMILY MEDICINE CLINIC | Age: 68
End: 2023-06-23
Payer: MEDICARE

## 2023-06-23 VITALS
HEIGHT: 70 IN | TEMPERATURE: 96.9 F | OXYGEN SATURATION: 96 % | BODY MASS INDEX: 24.98 KG/M2 | HEART RATE: 55 BPM | SYSTOLIC BLOOD PRESSURE: 100 MMHG | WEIGHT: 174.5 LBS | DIASTOLIC BLOOD PRESSURE: 64 MMHG

## 2023-06-23 DIAGNOSIS — J01.90 ACUTE BACTERIAL SINUSITIS: ICD-10-CM

## 2023-06-23 DIAGNOSIS — B96.89 ACUTE BACTERIAL SINUSITIS: ICD-10-CM

## 2023-06-23 DIAGNOSIS — G43.909 MIGRAINE WITHOUT STATUS MIGRAINOSUS, NOT INTRACTABLE, UNSPECIFIED MIGRAINE TYPE: Primary | ICD-10-CM

## 2023-06-23 DIAGNOSIS — F41.1 GAD (GENERALIZED ANXIETY DISORDER): ICD-10-CM

## 2023-06-23 DIAGNOSIS — G44.229 CHRONIC TENSION-TYPE HEADACHE, NOT INTRACTABLE: ICD-10-CM

## 2023-06-23 DIAGNOSIS — F51.01 PRIMARY INSOMNIA: ICD-10-CM

## 2023-06-23 PROCEDURE — 1123F ACP DISCUSS/DSCN MKR DOCD: CPT | Performed by: NURSE PRACTITIONER

## 2023-06-23 PROCEDURE — 99214 OFFICE O/P EST MOD 30 MIN: CPT | Performed by: NURSE PRACTITIONER

## 2023-06-23 PROCEDURE — 3074F SYST BP LT 130 MM HG: CPT | Performed by: NURSE PRACTITIONER

## 2023-06-23 PROCEDURE — 3078F DIAST BP <80 MM HG: CPT | Performed by: NURSE PRACTITIONER

## 2023-06-23 RX ORDER — CEFDINIR 300 MG/1
300 CAPSULE ORAL 2 TIMES DAILY
Qty: 20 CAPSULE | Refills: 0 | Status: SHIPPED | OUTPATIENT
Start: 2023-06-23 | End: 2023-07-03

## 2023-06-23 RX ORDER — BUTALBITAL, ACETAMINOPHEN AND CAFFEINE 50; 325; 40 MG/1; MG/1; MG/1
1 TABLET ORAL EVERY 6 HOURS PRN
Qty: 120 TABLET | Refills: 0 | Status: SHIPPED | OUTPATIENT
Start: 2023-06-23

## 2023-06-23 RX ORDER — CLONAZEPAM 1 MG/1
1 TABLET ORAL 3 TIMES DAILY PRN
Qty: 90 TABLET | Refills: 0 | Status: SHIPPED | OUTPATIENT
Start: 2023-06-23 | End: 2023-07-23

## 2023-06-23 RX ORDER — RIMEGEPANT SULFATE 75 MG/75MG
75 TABLET, ORALLY DISINTEGRATING ORAL EVERY OTHER DAY
Qty: 16 TABLET | Refills: 5 | Status: SHIPPED | OUTPATIENT
Start: 2023-06-23

## 2023-06-23 ASSESSMENT — ENCOUNTER SYMPTOMS
WHEEZING: 0
SINUS PAIN: 1
BLOOD IN STOOL: 0
DIARRHEA: 0
SORE THROAT: 0
SINUS PRESSURE: 1
COUGH: 0
CONSTIPATION: 0
RHINORRHEA: 0
CHOKING: 0
EYE DISCHARGE: 0
EYE REDNESS: 0

## 2023-06-23 NOTE — PROGRESS NOTES
Сергей Linder (:  1955) is a 76 y.o. male,Established patient, here for evaluation of the following chief complaint(s):  3 Month Follow-Up (Patient has a cough, ongoing for one month, patient states he does not want to take any test, says he is coughing up phlegm. Patient has been having headaches, Preeti Cadet and Fiorcet are not helping Tobi Shade on med list but patient says they did not )) and Medication Refill      ASSESSMENT/PLAN:    ICD-10-CM    1. Migraine without status migrainosus, not intractable, unspecified migraine type  G43.909 Rimegepant Sulfate (NURTEC) 75 MG TBDP      2. Primary insomnia  F51.01 clonazePAM (KLONOPIN) 1 MG tablet      3. ABHINAV (generalized anxiety disorder)  F41.1 clonazePAM (KLONOPIN) 1 MG tablet      4. Chronic tension-type headache, not intractable  G44.229 butalbital-acetaminophen-caffeine (FIORICET, ESGIC) -40 MG per tablet      5. Acute bacterial sinusitis  J01.90 cefdinir (OMNICEF) 300 MG capsule    B96.89         Controlled Substance Monitoring:    Acute and Chronic Pain Monitoring:   RX Monitoring 2023   Attestation -   Periodic Controlled Substance Monitoring Possible medication side effects, risk of tolerance/dependence & alternative treatments discussed. ;No signs of potential drug abuse or diversion identified. Chronic Pain > 80 MEDD -         Return in about 3 months (around 2023). SUBJECTIVE/OBJECTIVE:  HPI  3 Month Follow-Up (Patient has a cough, ongoing for one month, patient states he does not want to take any test, says he is coughing up phlegm. Patient has been having headaches, Preeti Cadet and Fiorcet are not helping Tobi Sh[place] on med list but patient says they did not )) and Medication Refill    Review of Systems   Constitutional:  Negative for appetite change and unexpected weight change. HENT:  Positive for sinus pressure and sinus pain. Negative for congestion, ear pain, rhinorrhea and sore throat.     Eyes:  Negative for

## 2023-09-22 ENCOUNTER — OFFICE VISIT (OUTPATIENT)
Dept: FAMILY MEDICINE CLINIC | Age: 68
End: 2023-09-22
Payer: MEDICARE

## 2023-09-22 VITALS
BODY MASS INDEX: 24.25 KG/M2 | SYSTOLIC BLOOD PRESSURE: 143 MMHG | HEART RATE: 74 BPM | DIASTOLIC BLOOD PRESSURE: 83 MMHG | WEIGHT: 169 LBS | TEMPERATURE: 98 F | OXYGEN SATURATION: 97 %

## 2023-09-22 DIAGNOSIS — F41.1 GAD (GENERALIZED ANXIETY DISORDER): ICD-10-CM

## 2023-09-22 DIAGNOSIS — G44.229 CHRONIC TENSION-TYPE HEADACHE, NOT INTRACTABLE: ICD-10-CM

## 2023-09-22 DIAGNOSIS — F51.01 PRIMARY INSOMNIA: ICD-10-CM

## 2023-09-22 DIAGNOSIS — G43.709 CHRONIC MIGRAINE WITHOUT AURA WITHOUT STATUS MIGRAINOSUS, NOT INTRACTABLE: Primary | ICD-10-CM

## 2023-09-22 PROCEDURE — 3079F DIAST BP 80-89 MM HG: CPT | Performed by: NURSE PRACTITIONER

## 2023-09-22 PROCEDURE — 3077F SYST BP >= 140 MM HG: CPT | Performed by: NURSE PRACTITIONER

## 2023-09-22 PROCEDURE — 99214 OFFICE O/P EST MOD 30 MIN: CPT | Performed by: NURSE PRACTITIONER

## 2023-09-22 PROCEDURE — 1123F ACP DISCUSS/DSCN MKR DOCD: CPT | Performed by: NURSE PRACTITIONER

## 2023-09-22 RX ORDER — CLONAZEPAM 1 MG/1
1 TABLET ORAL 3 TIMES DAILY PRN
Qty: 90 TABLET | Refills: 0 | Status: SHIPPED | OUTPATIENT
Start: 2023-09-22 | End: 2023-10-22

## 2023-09-22 RX ORDER — ATOGEPANT 60 MG/1
60 TABLET ORAL DAILY
Qty: 30 TABLET | Refills: 5 | Status: SHIPPED | OUTPATIENT
Start: 2023-09-22

## 2023-09-22 RX ORDER — BUTALBITAL, ACETAMINOPHEN AND CAFFEINE 50; 325; 40 MG/1; MG/1; MG/1
1 TABLET ORAL EVERY 6 HOURS PRN
Qty: 120 TABLET | Refills: 0 | Status: SHIPPED | OUTPATIENT
Start: 2023-09-22

## 2023-09-22 ASSESSMENT — ENCOUNTER SYMPTOMS
COUGH: 0
BLOOD IN STOOL: 0
DIARRHEA: 0
SINUS PRESSURE: 1
EYE DISCHARGE: 0
SINUS PAIN: 1
RHINORRHEA: 0
CHOKING: 0
EYE REDNESS: 0
WHEEZING: 0
CONSTIPATION: 0
SORE THROAT: 0

## 2023-09-26 ENCOUNTER — TELEPHONE (OUTPATIENT)
Dept: FAMILY MEDICINE CLINIC | Age: 68
End: 2023-09-26

## 2023-09-26 RX ORDER — DOXYCYCLINE HYCLATE 100 MG
100 TABLET ORAL 2 TIMES DAILY
Qty: 20 TABLET | Refills: 0 | Status: SHIPPED | OUTPATIENT
Start: 2023-09-26 | End: 2023-10-06

## 2023-09-26 NOTE — TELEPHONE ENCOUNTER
Received fax from [facility], patient's Iveth Garza was denied. Request patient try Aimovig or Emg[employer]. Please advise.

## 2023-09-26 NOTE — TELEPHONE ENCOUNTER
Pts daughter called, he was just seen 9/22/23 and was supposed to get an abx for sinuses/HA-needs to [address] [place] [address]    She said to tell you thank you!

## 2023-10-28 DIAGNOSIS — K21.9 GASTROESOPHAGEAL REFLUX DISEASE WITHOUT ESOPHAGITIS: ICD-10-CM

## 2023-10-30 RX ORDER — ESOMEPRAZOLE MAGNESIUM 40 MG/1
40 CAPSULE, DELAYED RELEASE ORAL DAILY
Qty: 90 CAPSULE | Refills: 3 | Status: SHIPPED | OUTPATIENT
Start: 2023-10-30

## 2023-10-30 NOTE — TELEPHONE ENCOUNTER
Received fax from pharmacy requesting refill on pts medication(s). Pt was last seen in office on 9/22/2023  and has a follow up scheduled for 12/22/2023. Will send request to  Dillon Rosas  for authorization.      Requested Prescriptions     Pending Prescriptions Disp Refills    esomeprazole (NEXIUM) 40 MG delayed release capsule [Pharmacy Med Name: Esomeprazole Magnesium 40 MG Oral Capsule Delayed Release] 90 capsule 0     Sig: Take 1 capsule by mouth once daily

## 2023-12-28 ENCOUNTER — TELEPHONE (OUTPATIENT)
Dept: FAMILY MEDICINE CLINIC | Age: 68
End: 2023-12-28

## 2023-12-28 DIAGNOSIS — I10 PRIMARY HYPERTENSION: Primary | ICD-10-CM

## 2023-12-28 DIAGNOSIS — I25.10 CORONARY ARTERY DISEASE INVOLVING NATIVE CORONARY ARTERY OF NATIVE HEART WITHOUT ANGINA PECTORIS: ICD-10-CM

## 2023-12-28 NOTE — TELEPHONE ENCOUNTER
Daughter called, Trudy Bunch no longer takes [facility], so needs a referral to [address] [place] [address].      States that Trudy Bunch is out of the office this week, so would like you to place the referral

## 2023-12-29 ENCOUNTER — TELEPHONE (OUTPATIENT)
Dept: CARDIOLOGY CLINIC | Age: 68
End: 2023-12-29

## 2023-12-29 NOTE — TELEPHONE ENCOUNTER
Called in an attempt to schedule appt from a new referral the office received. Patient stated he would have his Daughter call back to handle the appt. If patient calls back please send back to the office.

## 2024-01-23 ENCOUNTER — OFFICE VISIT (OUTPATIENT)
Dept: CARDIOLOGY CLINIC | Age: 69
End: 2024-01-23
Payer: MEDICARE

## 2024-01-23 VITALS
WEIGHT: 165 LBS | DIASTOLIC BLOOD PRESSURE: 72 MMHG | OXYGEN SATURATION: 98 % | BODY MASS INDEX: 23.62 KG/M2 | SYSTOLIC BLOOD PRESSURE: 120 MMHG | HEIGHT: 70 IN | HEART RATE: 66 BPM

## 2024-01-23 DIAGNOSIS — I48.0 PAF (PAROXYSMAL ATRIAL FIBRILLATION) (HCC): ICD-10-CM

## 2024-01-23 DIAGNOSIS — I50.22 CHRONIC SYSTOLIC (CONGESTIVE) HEART FAILURE (HCC): ICD-10-CM

## 2024-01-23 DIAGNOSIS — I25.10 CORONARY ARTERY DISEASE INVOLVING NATIVE CORONARY ARTERY OF NATIVE HEART WITHOUT ANGINA PECTORIS: Primary | ICD-10-CM

## 2024-01-23 DIAGNOSIS — I10 ESSENTIAL HYPERTENSION: ICD-10-CM

## 2024-01-23 PROCEDURE — 1123F ACP DISCUSS/DSCN MKR DOCD: CPT | Performed by: NURSE PRACTITIONER

## 2024-01-23 PROCEDURE — 3078F DIAST BP <80 MM HG: CPT | Performed by: NURSE PRACTITIONER

## 2024-01-23 PROCEDURE — 93000 ELECTROCARDIOGRAM COMPLETE: CPT | Performed by: NURSE PRACTITIONER

## 2024-01-23 PROCEDURE — 3074F SYST BP LT 130 MM HG: CPT | Performed by: NURSE PRACTITIONER

## 2024-01-23 PROCEDURE — 1036F TOBACCO NON-USER: CPT | Performed by: NURSE PRACTITIONER

## 2024-01-23 PROCEDURE — G8420 CALC BMI NORM PARAMETERS: HCPCS | Performed by: NURSE PRACTITIONER

## 2024-01-23 PROCEDURE — 99204 OFFICE O/P NEW MOD 45 MIN: CPT | Performed by: NURSE PRACTITIONER

## 2024-01-23 PROCEDURE — G8427 DOCREV CUR MEDS BY ELIG CLIN: HCPCS | Performed by: NURSE PRACTITIONER

## 2024-01-23 PROCEDURE — 3017F COLORECTAL CA SCREEN DOC REV: CPT | Performed by: NURSE PRACTITIONER

## 2024-01-23 PROCEDURE — G8484 FLU IMMUNIZE NO ADMIN: HCPCS | Performed by: NURSE PRACTITIONER

## 2024-01-23 RX ORDER — LOSARTAN POTASSIUM 50 MG/1
50 TABLET ORAL DAILY
COMMUNITY
Start: 2023-10-13

## 2024-01-23 RX ORDER — FUROSEMIDE 40 MG/1
TABLET ORAL
COMMUNITY
Start: 2023-12-19

## 2024-01-23 RX ORDER — ASPIRIN 81 MG/1
81 TABLET ORAL DAILY
COMMUNITY

## 2024-01-23 ASSESSMENT — ENCOUNTER SYMPTOMS
SORE THROAT: 0
CHEST TIGHTNESS: 0
COUGH: 0
WHEEZING: 0
SHORTNESS OF BREATH: 0

## 2024-01-23 NOTE — PROGRESS NOTES
[facility]  [address]  [address]  [facility] [address]  [phone]      Chief Complaint / Reason for Being Seen:  Establish care     1. Coronary artery disease involving native coronary artery of native heart without angina pectoris    2. Chronic systolic (congestive) heart failure    3. PAF (paroxysmal atrial fibrillation) ([facility])    4. Essential hypertension        Patient with a history of coronary artery disease/CABG x 5, ischemic cardiomyopathy, chronic systolic congestive heart failure, hypertension, dyslipidemia, atrial fib/flutter, AV block, Mobitz 2 which resolved after stopping diltiazem and decreasing metoprolol succinate. Patient had been followed at [facility] cardiology.  Transferring care to [facility].      2D echo 1/13/2022 EF 36 to 40%. 2D echo on 5/11/2023 revealed normal left ventricular systolic function with ejection fraction 61 to 65%, indeterminate left ventricular diastolic function, hypokinetic mid anterior, apical anterior, apical septal, mid anteroseptal and apex wall segments, mildly increased left atrial volume and no significant valvular heart disease. He has ischemic cardiomyopathy, chronic systolic congestive heart failure, coronary artery disease status post CABG x5 (LIMA to LAD, SVG to RCA, SVG to OM1 and sequential SVG to ramus intermedius and first diagonal) 10/22/2021 per Dr. Madhu Lay at [facility], persistent atrial fib/flutter on chronic anticoagulation, hypertension, hyperlipidemia and skin cancer.     Patient had been on Entresto but was unable to tolerate it due to fatigue.      Patient accompanied by daughter.  Denies any complaints of chest pain, pressure or tightness.  There is no shortness of breath, orthopnea or PND.  Patient denies any lightheadedness, dizziness or syncope.    Subjective:    Old records have been obtained from the referring providers.  Those records have been reviewed and summarized.      Sha Pierre is a 68 y.o. male

## 2024-03-21 ENCOUNTER — TELEPHONE (OUTPATIENT)
Dept: CARDIOLOGY CLINIC | Age: 69
End: 2024-03-21

## 2024-03-21 NOTE — TELEPHONE ENCOUNTER
Pt daughter called stating when pt gets anxious his BP gets high. Pt daughter advised to speak with PCP about anxiety and see if that helps with his BP. If PCP wants us to adjust BP meds let us know. Pt has apt with PCP next week and will let us know from that apt.

## 2024-03-22 ENCOUNTER — TELEPHONE (OUTPATIENT)
Dept: FAMILY MEDICINE CLINIC | Age: 69
End: 2024-03-22

## 2024-03-22 NOTE — TELEPHONE ENCOUNTER
Pt daughter called stating pt BP has been elevated since beginning of march- unable to get BP down believes it is due to pt spouse death month being march and the anxiety that comes with that     Most recent Readings:    140/110  148/97    Patient typically runs low daughter states he has kept a BP log and he will bring this to his Medicare Annual Wellness next week       Daughter called cardiology office yesterday 3/21/24:  Yasmin Hutchison LPN         3/21/24 10:46 AM  Note      Pt daughter called stating when pt gets anxious his BP gets high. Pt daughter advised to speak with PCP about anxiety and see if that helps with his BP. If PCP wants us to adjust BP meds let us know. Pt has apt with PCP next week and will let us know from that apt.       Daughter is unsure if patient just needs something for anxiety or if he needs additional BP medication/ adjustments     Please advise

## 2024-03-28 ENCOUNTER — OFFICE VISIT (OUTPATIENT)
Dept: FAMILY MEDICINE CLINIC | Age: 69
End: 2024-03-28
Payer: MEDICARE

## 2024-03-28 VITALS
WEIGHT: 167 LBS | DIASTOLIC BLOOD PRESSURE: 62 MMHG | TEMPERATURE: 98.4 F | HEIGHT: 70 IN | SYSTOLIC BLOOD PRESSURE: 99 MMHG | BODY MASS INDEX: 23.91 KG/M2 | OXYGEN SATURATION: 93 % | HEART RATE: 65 BPM

## 2024-03-28 DIAGNOSIS — Z53.20 COLON CANCER SCREENING DECLINED: ICD-10-CM

## 2024-03-28 DIAGNOSIS — F41.1 GAD (GENERALIZED ANXIETY DISORDER): ICD-10-CM

## 2024-03-28 DIAGNOSIS — F51.01 PRIMARY INSOMNIA: ICD-10-CM

## 2024-03-28 DIAGNOSIS — G44.229 CHRONIC TENSION-TYPE HEADACHE, NOT INTRACTABLE: ICD-10-CM

## 2024-03-28 DIAGNOSIS — Z00.00 MEDICARE ANNUAL WELLNESS VISIT, SUBSEQUENT: Primary | ICD-10-CM

## 2024-03-28 DIAGNOSIS — Z87.891 PERSONAL HISTORY OF TOBACCO USE: ICD-10-CM

## 2024-03-28 PROCEDURE — 1123F ACP DISCUSS/DSCN MKR DOCD: CPT | Performed by: NURSE PRACTITIONER

## 2024-03-28 PROCEDURE — 3074F SYST BP LT 130 MM HG: CPT | Performed by: NURSE PRACTITIONER

## 2024-03-28 PROCEDURE — 3078F DIAST BP <80 MM HG: CPT | Performed by: NURSE PRACTITIONER

## 2024-03-28 PROCEDURE — G0439 PPPS, SUBSEQ VISIT: HCPCS | Performed by: NURSE PRACTITIONER

## 2024-03-28 RX ORDER — CLONAZEPAM 1 MG/1
1 TABLET ORAL 3 TIMES DAILY PRN
Qty: 90 TABLET | Refills: 0 | Status: SHIPPED | OUTPATIENT
Start: 2024-03-28 | End: 2024-04-27

## 2024-03-28 RX ORDER — BUTALBITAL, ACETAMINOPHEN AND CAFFEINE 50; 325; 40 MG/1; MG/1; MG/1
1 TABLET ORAL EVERY 6 HOURS PRN
Qty: 120 TABLET | Refills: 0 | Status: SHIPPED | OUTPATIENT
Start: 2024-03-28

## 2024-03-28 SDOH — ECONOMIC STABILITY: FOOD INSECURITY: WITHIN THE PAST 12 MONTHS, THE FOOD YOU BOUGHT JUST DIDN'T LAST AND YOU DIDN'T HAVE MONEY TO GET MORE.: NEVER TRUE

## 2024-03-28 SDOH — ECONOMIC STABILITY: FOOD INSECURITY: WITHIN THE PAST 12 MONTHS, YOU WORRIED THAT YOUR FOOD WOULD RUN OUT BEFORE YOU GOT MONEY TO BUY MORE.: NEVER TRUE

## 2024-03-28 SDOH — ECONOMIC STABILITY: INCOME INSECURITY: HOW HARD IS IT FOR YOU TO PAY FOR THE VERY BASICS LIKE FOOD, HOUSING, MEDICAL CARE, AND HEATING?: NOT HARD AT ALL

## 2024-03-28 ASSESSMENT — PATIENT HEALTH QUESTIONNAIRE - PHQ9
2. FEELING DOWN, DEPRESSED OR HOPELESS: NOT AT ALL
SUM OF ALL RESPONSES TO PHQ9 QUESTIONS 1 & 2: 0
SUM OF ALL RESPONSES TO PHQ QUESTIONS 1-9: 0
1. LITTLE INTEREST OR PLEASURE IN DOING THINGS: NOT AT ALL
SUM OF ALL RESPONSES TO PHQ QUESTIONS 1-9: 0

## 2024-03-28 NOTE — PATIENT INSTRUCTIONS

## 2024-03-28 NOTE — PROGRESS NOTES
Medicare Annual Wellness Visit    Sha Pierre is here for Medicare AWV (Pts is here for awv. Pts has been having issues with BP being too high. )    Assessment & Plan   Medicare annual wellness visit, subsequent  Personal history of tobacco use  Patient declined ct screening  Primary insomnia  -     clonazePAM (KLONOPIN) 1 MG tablet; Take 1 tablet by mouth 3 times daily as needed for Anxiety for up to 30 days., Disp-90 tablet, R-0Normal  ABHINAV (generalized anxiety disorder)  -     clonazePAM (KLONOPIN) 1 MG tablet; Take 1 tablet by mouth 3 times daily as needed for Anxiety for up to 30 days., Disp-90 tablet, R-0Normal  Chronic tension-type headache, not intractable  -     butalbital-acetaminophen-caffeine (FIORICET, ESGIC) -40 MG per tablet; Take 1 tablet by mouth every 6 hours as needed for Headaches, Disp-120 tablet, R-0Normal  Recommendations for Preventive Services Due: see orders and patient instructions/AVS.  Recommended screening schedule for the next 5-10 years is provided to the patient in written form: see Patient Instructions/AVS.     Return in about 3 months (around 6/28/2024).     Subjective   The following acute and/or chronic problems were also addressed today:  Headaches  Started qulipta. His is not complaining to day about his headaches like he normally dose.      Insomnia  Klonopin is helping him go to sleep but he isn't staying asleep and is also taking melatonin.        Patient's complete Health Risk Assessment and screening values have been reviewed and are found in Flowsheets. The following problems were reviewed today and where indicated follow up appointments were made and/or referrals ordered.    No Positive Risk Factors identified today.                     Dentist Screen:  Have you seen the dentist within the past year?: (!) No    Intervention:  Advised to schedule with their dentist        Lung Cancer Screening:  Guidelines regarding LDCT screening for lung cancer reviewed. Patient

## 2024-03-29 RX ORDER — ATORVASTATIN CALCIUM 40 MG/1
40 TABLET, FILM COATED ORAL NIGHTLY
Qty: 90 TABLET | Refills: 3 | Status: SHIPPED | OUTPATIENT
Start: 2024-03-29

## 2024-04-18 ENCOUNTER — TELEPHONE (OUTPATIENT)
Dept: FAMILY MEDICINE CLINIC | Age: 69
End: 2024-04-18

## 2024-04-18 NOTE — PROGRESS NOTES
Spoke with daughter after being paged.  She does believe patient is currently in A-fib.  She did put a pulse ox on his finger and was noted to be fluctuating from 60-90.  Blood pressure is currently 105/60.  He ports feeling fatigued but denies any near syncopal symptoms, chest pain, or other concerning symptoms.  Daughter is aware that if patient develops any of these symptoms or develops a heart rate sustained 125 or higher he is to g o on to ER.  Patient is to call our office or cardiology first thing in the morning.  Patient is currently on metoprolol, losartan, and is anticoagulated with Eliquis.  He will hold losartan in the a.m. due to blood pressure being lower.  He is to continue metoprolol at this time.

## 2024-04-29 DIAGNOSIS — G43.709 CHRONIC MIGRAINE WITHOUT AURA WITHOUT STATUS MIGRAINOSUS, NOT INTRACTABLE: ICD-10-CM

## 2024-04-29 RX ORDER — ATOGEPANT 60 MG/1
60 TABLET ORAL DAILY
Qty: 30 TABLET | Refills: 5 | Status: SHIPPED | OUTPATIENT
Start: 2024-04-29

## 2024-04-29 RX ORDER — ATOGEPANT 60 MG/1
1 TABLET ORAL DAILY
Qty: 90 TABLET | Refills: 0 | OUTPATIENT
Start: 2024-04-29

## 2024-04-29 NOTE — TELEPHONE ENCOUNTER
Received call/My Chart Message from patient requesting refill on medication(s). Pt was last seen in office on 3/28/2024  and has a follow up scheduled for 4/29/2024. Will send request to provider for authorization.     Requested Prescriptions     Pending Prescriptions Disp Refills    Atogepant (QULIPTA) 60 MG TABS 30 tablet 5     Sig: Take 60 mg by mouth daily

## 2024-05-03 ENCOUNTER — TELEPHONE (OUTPATIENT)
Dept: FAMILY MEDICINE CLINIC | Age: 69
End: 2024-05-03

## 2024-05-03 DIAGNOSIS — K59.04 CHRONIC IDIOPATHIC CONSTIPATION: Primary | ICD-10-CM

## 2024-05-03 RX ORDER — LUBIPROSTONE 24 UG/1
24 CAPSULE ORAL 2 TIMES DAILY WITH MEALS
Qty: 60 CAPSULE | Refills: 3 | Status: SHIPPED | OUTPATIENT
Start: 2024-05-03

## 2024-05-03 NOTE — TELEPHONE ENCOUNTER
Pts dtr called, they have tried ex lax, colace, senna-katherine, all over the counter meds-stool softeners 2 a day every day. Having issues. He can not go and needs some relief please.    This started after he started Quilipta and something else she thinks

## 2024-05-06 RX ORDER — CLOPIDOGREL BISULFATE 75 MG/1
75 TABLET ORAL DAILY
Qty: 30 TABLET | Refills: 5 | Status: SHIPPED | OUTPATIENT
Start: 2024-05-06

## 2024-05-09 ENCOUNTER — OFFICE VISIT (OUTPATIENT)
Dept: FAMILY MEDICINE CLINIC | Age: 69
End: 2024-05-09
Payer: MEDICARE

## 2024-05-09 VITALS
SYSTOLIC BLOOD PRESSURE: 102 MMHG | DIASTOLIC BLOOD PRESSURE: 84 MMHG | HEART RATE: 87 BPM | WEIGHT: 176.25 LBS | BODY MASS INDEX: 25.29 KG/M2 | OXYGEN SATURATION: 96 %

## 2024-05-09 DIAGNOSIS — E55.9 VITAMIN D DEFICIENCY: ICD-10-CM

## 2024-05-09 DIAGNOSIS — I10 ESSENTIAL HYPERTENSION: Primary | ICD-10-CM

## 2024-05-09 DIAGNOSIS — Z53.20 COLONOSCOPY REFUSED: ICD-10-CM

## 2024-05-09 DIAGNOSIS — I50.22 CHRONIC SYSTOLIC (CONGESTIVE) HEART FAILURE (HCC): ICD-10-CM

## 2024-05-09 DIAGNOSIS — Z12.5 SCREENING PSA (PROSTATE SPECIFIC ANTIGEN): ICD-10-CM

## 2024-05-09 DIAGNOSIS — I10 ESSENTIAL HYPERTENSION: ICD-10-CM

## 2024-05-09 DIAGNOSIS — R73.09 ELEVATED GLUCOSE: ICD-10-CM

## 2024-05-09 LAB
25(OH)D3 SERPL-MCNC: 32.4 NG/ML
ALBUMIN SERPL-MCNC: 3.7 G/DL (ref 3.5–5.2)
ALP SERPL-CCNC: 73 U/L (ref 40–130)
ALT SERPL-CCNC: 9 U/L (ref 5–41)
ANION GAP SERPL CALCULATED.3IONS-SCNC: 12 MMOL/L (ref 7–19)
AST SERPL-CCNC: 14 U/L (ref 5–40)
BASOPHILS # BLD: 0.1 K/UL (ref 0–0.2)
BASOPHILS NFR BLD: 1.2 % (ref 0–1)
BILIRUB SERPL-MCNC: 0.3 MG/DL (ref 0.2–1.2)
BUN SERPL-MCNC: 17 MG/DL (ref 8–23)
CALCIUM SERPL-MCNC: 9.2 MG/DL (ref 8.8–10.2)
CHLORIDE SERPL-SCNC: 97 MMOL/L (ref 98–111)
CHOLEST SERPL-MCNC: 86 MG/DL (ref 160–199)
CO2 SERPL-SCNC: 24 MMOL/L (ref 22–29)
CREAT SERPL-MCNC: 1.1 MG/DL (ref 0.5–1.2)
EOSINOPHIL # BLD: 0.4 K/UL (ref 0–0.6)
EOSINOPHIL NFR BLD: 4 % (ref 0–5)
ERYTHROCYTE [DISTWIDTH] IN BLOOD BY AUTOMATED COUNT: 14.2 % (ref 11.5–14.5)
GLUCOSE SERPL-MCNC: 118 MG/DL (ref 74–109)
HBA1C MFR BLD: 5.7 % (ref 4–6)
HCT VFR BLD AUTO: 40.2 % (ref 42–52)
HDLC SERPL-MCNC: 38 MG/DL (ref 55–121)
HGB BLD-MCNC: 12.6 G/DL (ref 14–18)
IMM GRANULOCYTES # BLD: 0.1 K/UL
LDLC SERPL CALC-MCNC: 34 MG/DL
LYMPHOCYTES # BLD: 1.8 K/UL (ref 1.1–4.5)
LYMPHOCYTES NFR BLD: 18.9 % (ref 20–40)
MCH RBC QN AUTO: 28.6 PG (ref 27–31)
MCHC RBC AUTO-ENTMCNC: 31.3 G/DL (ref 33–37)
MCV RBC AUTO: 91.2 FL (ref 80–94)
MONOCYTES # BLD: 0.6 K/UL (ref 0–0.9)
MONOCYTES NFR BLD: 6.5 % (ref 0–10)
NEUTROPHILS # BLD: 6.7 K/UL (ref 1.5–7.5)
NEUTS SEG NFR BLD: 68.7 % (ref 50–65)
PLATELET # BLD AUTO: 252 K/UL (ref 130–400)
PMV BLD AUTO: 10.5 FL (ref 9.4–12.4)
POTASSIUM SERPL-SCNC: 4.5 MMOL/L (ref 3.5–5)
PROT SERPL-MCNC: 7.2 G/DL (ref 6.6–8.7)
PSA SERPL-MCNC: 4.38 NG/ML (ref 0–4)
RBC # BLD AUTO: 4.41 M/UL (ref 4.7–6.1)
SODIUM SERPL-SCNC: 133 MMOL/L (ref 136–145)
T4 FREE SERPL-MCNC: 1.04 NG/DL (ref 0.93–1.7)
TRIGL SERPL-MCNC: 69 MG/DL (ref 0–149)
TSH SERPL DL<=0.005 MIU/L-ACNC: 2.7 UIU/ML (ref 0.27–4.2)
VIT B12 SERPL-MCNC: 625 PG/ML (ref 211–946)
WBC # BLD AUTO: 9.7 K/UL (ref 4.8–10.8)

## 2024-05-09 PROCEDURE — 99214 OFFICE O/P EST MOD 30 MIN: CPT | Performed by: NURSE PRACTITIONER

## 2024-05-09 PROCEDURE — 1123F ACP DISCUSS/DSCN MKR DOCD: CPT | Performed by: NURSE PRACTITIONER

## 2024-05-09 PROCEDURE — 3079F DIAST BP 80-89 MM HG: CPT | Performed by: NURSE PRACTITIONER

## 2024-05-09 PROCEDURE — 3074F SYST BP LT 130 MM HG: CPT | Performed by: NURSE PRACTITIONER

## 2024-05-09 ASSESSMENT — ENCOUNTER SYMPTOMS
RHINORRHEA: 0
WHEEZING: 0
SORE THROAT: 0
EYE DISCHARGE: 0
COUGH: 0
EYE REDNESS: 0
CHOKING: 0
BLOOD IN STOOL: 0
DIARRHEA: 0
CONSTIPATION: 0

## 2024-05-09 NOTE — PROGRESS NOTES
Sha Pierre (:  1955) is a 68 y.o. male,Established patient, here for evaluation of the following chief complaint(s):  Fatigue and Atrial Fibrillation      ASSESSMENT/PLAN:    ICD-10-CM    1. Essential hypertension  I10 Vitamin B12     CBC with Auto Differential     Comprehensive Metabolic Panel     Lipid Panel     T4, Free     TSH      2. Chronic systolic (congestive) heart failure  I50.22 Vitamin B12     CBC with Auto Differential     Comprehensive Metabolic Panel     Lipid Panel     T4, Free     TSH      3. Screening PSA (prostate specific antigen)  Z12.5 PSA Screening      4. Vitamin D deficiency  E55.9 Vitamin D 25 Hydroxy      5. Elevated glucose  R73.09 Hemoglobin A1C      6. Colonoscopy refused  Z53.20       EKG shows sinus rhythm today.  With him being on Eliquis will check his lab work today to make sure he does not have any anemia.  He is resistant to this but agrees.    Return in about 7 weeks (around 2024).    SUBJECTIVE/OBJECTIVE:  HPI  He has been feeling fatigued lately and his daughter has been checking his pulse ox it has been running normal but his heart rate has been variable.  But she has checked her heart rate with this to and it is also been variable on her so she just wanted his heart checked.    Review of Systems   Constitutional:  Positive for fatigue. Negative for appetite change and unexpected weight change.   HENT:  Negative for congestion, ear pain, rhinorrhea and sore throat.    Eyes:  Negative for discharge and redness.   Respiratory:  Negative for cough, choking and wheezing.    Cardiovascular:  Negative for chest pain.   Gastrointestinal:  Negative for blood in stool, constipation and diarrhea.   Genitourinary:  Negative for decreased urine volume and dysuria.   Skin:  Negative for rash.   Neurological:  Negative for weakness.   Hematological:  Negative for adenopathy.   Psychiatric/Behavioral:  Positive for sleep disturbance. Negative for suicidal ideas.        BP

## 2024-05-13 ENCOUNTER — TELEPHONE (OUTPATIENT)
Dept: FAMILY MEDICINE CLINIC | Age: 69
End: 2024-05-13

## 2024-05-13 NOTE — TELEPHONE ENCOUNTER
Pt is aware of results and voiced understanding. He did state that he does not want a referral to urology as of right now.

## 2024-05-13 NOTE — TELEPHONE ENCOUNTER
----- Message from CAPO Gutierrez sent at 5/13/2024  8:27 AM CDT -----  Please inform patient results show  Cholesterol looks great continue lipitor  Sodium is mildly low. This can cause dizziness and fatigue. Ok to add a little salt in diet.   Cbc is stable from what it was 1-2 years ago. No new anemia.   Vit d and b12 are normal  Thyroid labs are normal.     Psa is elevated. This is a prostate antigen lab. I don't have a previous psa to compare it to to know if this is elevated from his baseline. But with this being greater than 4 . Recommend referral to urology for further evaluation. They will first just likely repeat the lab after an interval of time.

## 2024-05-21 DIAGNOSIS — K08.89 TOOTH ACHE: Primary | ICD-10-CM

## 2024-05-21 RX ORDER — CLINDAMYCIN HYDROCHLORIDE 300 MG/1
300 CAPSULE ORAL 3 TIMES DAILY
Qty: 30 CAPSULE | Refills: 0 | Status: SHIPPED | OUTPATIENT
Start: 2024-05-21 | End: 2024-05-31

## 2024-05-21 NOTE — TELEPHONE ENCOUNTER
Patient daughter called stating patient has developed a pretty bad tooth ache and would like to know if PCP could send something in?

## 2024-07-01 ENCOUNTER — OFFICE VISIT (OUTPATIENT)
Dept: FAMILY MEDICINE CLINIC | Age: 69
End: 2024-07-01
Payer: MEDICARE

## 2024-07-01 VITALS
TEMPERATURE: 96.9 F | SYSTOLIC BLOOD PRESSURE: 110 MMHG | WEIGHT: 173 LBS | OXYGEN SATURATION: 96 % | DIASTOLIC BLOOD PRESSURE: 80 MMHG | BODY MASS INDEX: 24.77 KG/M2 | HEIGHT: 70 IN | HEART RATE: 70 BPM

## 2024-07-01 DIAGNOSIS — F41.1 GAD (GENERALIZED ANXIETY DISORDER): ICD-10-CM

## 2024-07-01 DIAGNOSIS — K59.04 CHRONIC IDIOPATHIC CONSTIPATION: Primary | ICD-10-CM

## 2024-07-01 DIAGNOSIS — Z53.20 COLON CANCER SCREENING DECLINED: ICD-10-CM

## 2024-07-01 DIAGNOSIS — F51.01 PRIMARY INSOMNIA: ICD-10-CM

## 2024-07-01 PROCEDURE — 3079F DIAST BP 80-89 MM HG: CPT | Performed by: NURSE PRACTITIONER

## 2024-07-01 PROCEDURE — 99214 OFFICE O/P EST MOD 30 MIN: CPT | Performed by: NURSE PRACTITIONER

## 2024-07-01 PROCEDURE — 3074F SYST BP LT 130 MM HG: CPT | Performed by: NURSE PRACTITIONER

## 2024-07-01 PROCEDURE — 1123F ACP DISCUSS/DSCN MKR DOCD: CPT | Performed by: NURSE PRACTITIONER

## 2024-07-01 RX ORDER — LINACLOTIDE 290 UG/1
290 CAPSULE, GELATIN COATED ORAL
Qty: 30 CAPSULE | Refills: 5 | Status: SHIPPED | OUTPATIENT
Start: 2024-07-01

## 2024-07-01 RX ORDER — CLONAZEPAM 1 MG/1
1 TABLET ORAL 3 TIMES DAILY PRN
Qty: 90 TABLET | Refills: 0 | Status: SHIPPED | OUTPATIENT
Start: 2024-07-01 | End: 2024-07-31

## 2024-07-01 ASSESSMENT — ENCOUNTER SYMPTOMS
BLOOD IN STOOL: 0
RHINORRHEA: 0
WHEEZING: 0
DIARRHEA: 0
CONSTIPATION: 0
SORE THROAT: 0
COUGH: 0
EYE REDNESS: 0
EYE DISCHARGE: 0
CHOKING: 0

## 2024-07-01 ASSESSMENT — PATIENT HEALTH QUESTIONNAIRE - PHQ9
SUM OF ALL RESPONSES TO PHQ9 QUESTIONS 1 & 2: 0
1. LITTLE INTEREST OR PLEASURE IN DOING THINGS: NOT AT ALL
2. FEELING DOWN, DEPRESSED OR HOPELESS: NOT AT ALL
SUM OF ALL RESPONSES TO PHQ QUESTIONS 1-9: 0

## 2024-07-01 NOTE — PROGRESS NOTES
Sha Pierre (:  1955) is a 69 y.o. male,Established patient, here for evaluation of the following chief complaint(s):  3 Month Follow-Up and Diabetes Care Management      ASSESSMENT/PLAN:    ICD-10-CM    1. Chronic idiopathic constipation  K59.04 linaclotide (LINZESS) 290 MCG CAPS capsule      2. Primary insomnia  F51.01 clonazePAM (KLONOPIN) 1 MG tablet      3. ABHINAV (generalized anxiety disorder)  F41.1 clonazePAM (KLONOPIN) 1 MG tablet      4. Colon cancer screening declined  Z53.20       Change amitiza to linzess  Controlled Substance Monitoring:    Acute and Chronic Pain Monitoring:   RX Monitoring Periodic Controlled Substance Monitoring   2024  12:04 PM Possible medication side effects, risk of tolerance/dependence & alternative treatments discussed.;No signs of potential drug abuse or diversion identified.           Return in about 3 months (around 10/1/2024).    SUBJECTIVE/OBJECTIVE:  HPI  Insomnia  Klonopin is helping him go to sleep but he isn't staying asleep and is also taking melatonin.     Constipation  Amitiza is not working.     Review of Systems   Constitutional:  Positive for fatigue. Negative for appetite change and unexpected weight change.   HENT:  Negative for congestion, ear pain, rhinorrhea and sore throat.    Eyes:  Negative for discharge and redness.   Respiratory:  Negative for cough, choking and wheezing.    Cardiovascular:  Negative for chest pain.   Gastrointestinal:  Negative for blood in stool, constipation and diarrhea.   Genitourinary:  Negative for decreased urine volume and dysuria.   Skin:  Negative for rash.   Neurological:  Negative for weakness.   Hematological:  Negative for adenopathy.   Psychiatric/Behavioral:  Positive for sleep disturbance. Negative for suicidal ideas.        /80 (Site: Right Upper Arm, Position: Sitting, Cuff Size: Medium Adult)   Pulse 70   Temp 96.9 °F (36.1 °C) (Temporal)   Ht 1.778 m (5' 10\")   Wt 78.5 kg (173 lb)   SpO2 96%

## 2024-07-23 ENCOUNTER — OFFICE VISIT (OUTPATIENT)
Dept: CARDIOLOGY CLINIC | Age: 69
End: 2024-07-23
Payer: MEDICARE

## 2024-07-23 VITALS
OXYGEN SATURATION: 99 % | DIASTOLIC BLOOD PRESSURE: 62 MMHG | HEIGHT: 70 IN | HEART RATE: 64 BPM | WEIGHT: 175 LBS | SYSTOLIC BLOOD PRESSURE: 110 MMHG | BODY MASS INDEX: 25.05 KG/M2

## 2024-07-23 DIAGNOSIS — I25.10 CORONARY ARTERY DISEASE INVOLVING NATIVE CORONARY ARTERY OF NATIVE HEART WITHOUT ANGINA PECTORIS: ICD-10-CM

## 2024-07-23 DIAGNOSIS — I10 ESSENTIAL HYPERTENSION: ICD-10-CM

## 2024-07-23 DIAGNOSIS — T14.8XXA MUSCLE STRAIN: ICD-10-CM

## 2024-07-23 DIAGNOSIS — I50.22 CHRONIC SYSTOLIC HEART FAILURE (HCC): Primary | ICD-10-CM

## 2024-07-23 DIAGNOSIS — I48.0 PAF (PAROXYSMAL ATRIAL FIBRILLATION) (HCC): ICD-10-CM

## 2024-07-23 PROCEDURE — 1123F ACP DISCUSS/DSCN MKR DOCD: CPT | Performed by: NURSE PRACTITIONER

## 2024-07-23 PROCEDURE — 99214 OFFICE O/P EST MOD 30 MIN: CPT | Performed by: NURSE PRACTITIONER

## 2024-07-23 PROCEDURE — 3078F DIAST BP <80 MM HG: CPT | Performed by: NURSE PRACTITIONER

## 2024-07-23 PROCEDURE — 3074F SYST BP LT 130 MM HG: CPT | Performed by: NURSE PRACTITIONER

## 2024-07-23 RX ORDER — CYCLOBENZAPRINE HCL 10 MG
10 TABLET ORAL 3 TIMES DAILY PRN
Qty: 90 TABLET | Refills: 1 | Status: SHIPPED | OUTPATIENT
Start: 2024-07-23

## 2024-07-23 RX ORDER — CYCLOBENZAPRINE HCL 10 MG
10 TABLET ORAL 3 TIMES DAILY PRN
Qty: 90 TABLET | Refills: 0 | OUTPATIENT
Start: 2024-07-23

## 2024-07-23 ASSESSMENT — ENCOUNTER SYMPTOMS
SORE THROAT: 0
WHEEZING: 0
SHORTNESS OF BREATH: 0
COUGH: 0
CHEST TIGHTNESS: 0

## 2024-07-23 NOTE — PROGRESS NOTES
[facility]   Established Patient Office Visit  [address]  [address]  [facility] [address]  [phone]        OFFICE VISIT:  2024    Sha Pierre - : 1955    Reason For Visit:  Sha is a 69 y.o. male who is here for Follow-up    1. Chronic systolic heart failure (HCC)    2. PAF (paroxysmal atrial fibrillation) (HCC)    3. Essential hypertension    4. Coronary artery disease involving native coronary artery of native heart without angina pectoris         Patient with a history of coronary artery disease/CABG x 5, ischemic cardiomyopathy, chronic systolic congestive heart failure, hypertension, dyslipidemia, atrial fib/flutter, AV block, Mobitz 2 which resolved after stopping diltiazem and decreasing metoprolol succinate. Patient had been followed at [facility] cardiology.        2D echo 2022 EF 36 to 40%. 2D echo on 2023 revealed normal left ventricular systolic function with ejection fraction 61 to 65%, indeterminate left ventricular diastolic function, hypokinetic mid anterior, apical anterior, apical septal, mid anteroseptal and apex wall segments, mildly increased left atrial volume and no significant valvular heart disease. He has ischemic cardiomyopathy, chronic systolic congestive heart failure, coronary artery disease status post CABG x5 (LIMA to LAD, SVG to RCA, SVG to OM1 and sequential SVG to ramus intermedius and first diagonal) 10/22/2021 per Dr. Madhu Lay at [facility], persistent atrial fib/flutter on chronic anticoagulation, hypertension, hyperlipidemia and skin cancer.      Patient had been on Entresto but was unable to tolerate it due to fatigue.    Patient presents to clinic today for routine follow up. Patient with his daughter today. No complaints.     Subjective    Sha Pierre is a 69 y.o. male with the [facility]:    Patient Active Problem List    Diagnosis Date Noted    Atrial fibrillation with RVR (HCC)

## 2024-07-23 NOTE — TELEPHONE ENCOUNTER
Received fax from pharmacy requesting refill on pts medication(s). Pt was last seen in office on 7/1/2024  and has a follow up scheduled for 7/23/2024. Will send request to  Chio Deras  for authorization.     Requested Prescriptions     Pending Prescriptions Disp Refills    cyclobenzaprine (FLEXERIL) 10 MG tablet 90 tablet 1     Sig: Take 1 tablet by mouth 3 times daily as needed for Muscle spasms

## 2024-08-01 RX ORDER — LOSARTAN POTASSIUM 50 MG/1
50 TABLET ORAL DAILY
Qty: 90 TABLET | Refills: 3 | Status: SHIPPED | OUTPATIENT
Start: 2024-08-01

## 2024-08-08 RX ORDER — CLOPIDOGREL BISULFATE 75 MG/1
75 TABLET ORAL DAILY
Qty: 30 TABLET | Refills: 5 | Status: SHIPPED | OUTPATIENT
Start: 2024-08-08

## 2024-08-08 RX ORDER — METOPROLOL SUCCINATE 25 MG/1
25 TABLET, EXTENDED RELEASE ORAL NIGHTLY
Qty: 30 TABLET | Refills: 5 | Status: SHIPPED | OUTPATIENT
Start: 2024-08-08

## 2024-08-08 RX ORDER — SPIRONOLACTONE 25 MG/1
25 TABLET ORAL DAILY
Qty: 30 TABLET | Refills: 5 | Status: SHIPPED | OUTPATIENT
Start: 2024-08-08

## 2024-08-08 RX ORDER — LOSARTAN POTASSIUM 50 MG/1
50 TABLET ORAL DAILY
Qty: 90 TABLET | Refills: 3 | Status: SHIPPED | OUTPATIENT
Start: 2024-08-08

## 2024-09-05 ENCOUNTER — TELEPHONE (OUTPATIENT)
Dept: FAMILY MEDICINE CLINIC | Age: 69
End: 2024-09-05

## 2024-09-05 DIAGNOSIS — K08.89 TOOTH ACHE: Primary | ICD-10-CM

## 2024-09-05 NOTE — TELEPHONE ENCOUNTER
Pts daughter called stating his he having some issues with a tooth abscess. She is requesting an abx for this. She said that Chio usually gives him some augmentin for this.     Will send to provider for recommendations.

## 2024-09-05 NOTE — TELEPHONE ENCOUNTER
Please let patient or patient daughter know that Chio is out of the office this week.  He will need to be seen in office for evaluation and any medication for this.

## 2024-09-05 NOTE — TELEPHONE ENCOUNTER
Spoke to patients daughter. Is aware he will need to come in to be seen for us to send in an abx for abscess tooth. She said they will call back for appt.

## 2024-09-17 ENCOUNTER — OFFICE VISIT (OUTPATIENT)
Dept: FAMILY MEDICINE CLINIC | Age: 69
End: 2024-09-17
Payer: MEDICARE

## 2024-09-17 VITALS
OXYGEN SATURATION: 97 % | HEART RATE: 90 BPM | WEIGHT: 174 LBS | SYSTOLIC BLOOD PRESSURE: 118 MMHG | BODY MASS INDEX: 24.91 KG/M2 | DIASTOLIC BLOOD PRESSURE: 68 MMHG | HEIGHT: 70 IN | TEMPERATURE: 97.9 F

## 2024-09-17 DIAGNOSIS — G89.29 CHRONIC MIDLINE LOW BACK PAIN WITHOUT SCIATICA: ICD-10-CM

## 2024-09-17 DIAGNOSIS — M54.50 CHRONIC MIDLINE LOW BACK PAIN WITHOUT SCIATICA: ICD-10-CM

## 2024-09-17 DIAGNOSIS — J40 BRONCHITIS: Primary | ICD-10-CM

## 2024-09-17 PROCEDURE — 3074F SYST BP LT 130 MM HG: CPT | Performed by: NURSE PRACTITIONER

## 2024-09-17 PROCEDURE — 1123F ACP DISCUSS/DSCN MKR DOCD: CPT | Performed by: NURSE PRACTITIONER

## 2024-09-17 PROCEDURE — 99214 OFFICE O/P EST MOD 30 MIN: CPT | Performed by: NURSE PRACTITIONER

## 2024-09-17 PROCEDURE — 3078F DIAST BP <80 MM HG: CPT | Performed by: NURSE PRACTITIONER

## 2024-09-17 RX ORDER — AZITHROMYCIN 250 MG/1
TABLET, FILM COATED ORAL
Qty: 6 TABLET | Refills: 0 | Status: SHIPPED | OUTPATIENT
Start: 2024-09-17 | End: 2024-09-27

## 2024-09-17 RX ORDER — PREDNISONE 10 MG/1
TABLET ORAL
Qty: 42 TABLET | Refills: 0 | Status: SHIPPED | OUTPATIENT
Start: 2024-09-17

## 2024-09-17 SDOH — ECONOMIC STABILITY: FOOD INSECURITY: WITHIN THE PAST 12 MONTHS, YOU WORRIED THAT YOUR FOOD WOULD RUN OUT BEFORE YOU GOT MONEY TO BUY MORE.: NEVER TRUE

## 2024-09-17 SDOH — ECONOMIC STABILITY: FOOD INSECURITY: WITHIN THE PAST 12 MONTHS, THE FOOD YOU BOUGHT JUST DIDN'T LAST AND YOU DIDN'T HAVE MONEY TO GET MORE.: NEVER TRUE

## 2024-09-17 SDOH — ECONOMIC STABILITY: INCOME INSECURITY: HOW HARD IS IT FOR YOU TO PAY FOR THE VERY BASICS LIKE FOOD, HOUSING, MEDICAL CARE, AND HEATING?: NOT HARD AT ALL

## 2024-09-17 ASSESSMENT — ENCOUNTER SYMPTOMS
SHORTNESS OF BREATH: 1
RHINORRHEA: 0
SORE THROAT: 0
CHOKING: 0
COUGH: 1
EYE DISCHARGE: 0
CONSTIPATION: 0
WHEEZING: 1
BACK PAIN: 1
EYE REDNESS: 0
DIARRHEA: 0
BLOOD IN STOOL: 0

## 2024-10-01 ENCOUNTER — OFFICE VISIT (OUTPATIENT)
Dept: FAMILY MEDICINE CLINIC | Age: 69
End: 2024-10-01
Payer: MEDICARE

## 2024-10-01 DIAGNOSIS — M54.41 CHRONIC BILATERAL LOW BACK PAIN WITH BILATERAL SCIATICA: Primary | ICD-10-CM

## 2024-10-01 DIAGNOSIS — M54.42 CHRONIC BILATERAL LOW BACK PAIN WITH BILATERAL SCIATICA: Primary | ICD-10-CM

## 2024-10-01 DIAGNOSIS — F51.01 PRIMARY INSOMNIA: ICD-10-CM

## 2024-10-01 DIAGNOSIS — F41.1 GAD (GENERALIZED ANXIETY DISORDER): ICD-10-CM

## 2024-10-01 DIAGNOSIS — G44.229 CHRONIC TENSION-TYPE HEADACHE, NOT INTRACTABLE: ICD-10-CM

## 2024-10-01 DIAGNOSIS — G89.29 CHRONIC BILATERAL LOW BACK PAIN WITH BILATERAL SCIATICA: Primary | ICD-10-CM

## 2024-10-01 PROCEDURE — 1123F ACP DISCUSS/DSCN MKR DOCD: CPT | Performed by: NURSE PRACTITIONER

## 2024-10-01 PROCEDURE — 3074F SYST BP LT 130 MM HG: CPT | Performed by: NURSE PRACTITIONER

## 2024-10-01 PROCEDURE — 3078F DIAST BP <80 MM HG: CPT | Performed by: NURSE PRACTITIONER

## 2024-10-01 PROCEDURE — 99214 OFFICE O/P EST MOD 30 MIN: CPT | Performed by: NURSE PRACTITIONER

## 2024-10-01 RX ORDER — HYDROCODONE BITARTRATE AND ACETAMINOPHEN 5; 325 MG/1; MG/1
1 TABLET ORAL EVERY 6 HOURS PRN
Qty: 30 TABLET | Refills: 0 | Status: SHIPPED | OUTPATIENT
Start: 2024-10-01 | End: 2024-10-09

## 2024-10-01 RX ORDER — BUTALBITAL, ACETAMINOPHEN AND CAFFEINE 50; 325; 40 MG/1; MG/1; MG/1
1 TABLET ORAL EVERY 6 HOURS PRN
Qty: 120 TABLET | Refills: 0 | Status: SHIPPED | OUTPATIENT
Start: 2024-10-01

## 2024-10-01 RX ORDER — CLONAZEPAM 1 MG/1
1 TABLET ORAL 3 TIMES DAILY PRN
Qty: 90 TABLET | Refills: 0 | Status: SHIPPED | OUTPATIENT
Start: 2024-10-01 | End: 2024-10-31

## 2024-10-03 VITALS
DIASTOLIC BLOOD PRESSURE: 58 MMHG | OXYGEN SATURATION: 93 % | SYSTOLIC BLOOD PRESSURE: 101 MMHG | TEMPERATURE: 97.5 F | BODY MASS INDEX: 24.97 KG/M2 | HEART RATE: 76 BPM | WEIGHT: 174 LBS

## 2024-10-03 ASSESSMENT — ENCOUNTER SYMPTOMS
WHEEZING: 0
SORE THROAT: 0
EYE DISCHARGE: 0
BLOOD IN STOOL: 0
CONSTIPATION: 0
RHINORRHEA: 0
DIARRHEA: 0
BACK PAIN: 1
EYE REDNESS: 0
COUGH: 0
CHOKING: 0

## 2024-10-03 NOTE — PROGRESS NOTES
Sha Pierre (:  1955) is a 69 y.o. male,Established patient, here for evaluation of the following chief complaint(s):  3 Month Follow-Up (Pts states he is having lower back pain, worsening with time. Effecting movement and walking.)      ASSESSMENT/PLAN:    ICD-10-CM    1. Chronic bilateral low back pain with bilateral sciatica  M54.42 HYDROcodone-acetaminophen (NORCO) 5-325 MG per tablet    M54.41 MRI LUMBAR SPINE WO CONTRAST    G89.29       2. Primary insomnia  F51.01 clonazePAM (KLONOPIN) 1 MG tablet      3. ABHINAV (generalized anxiety disorder)  F41.1 clonazePAM (KLONOPIN) 1 MG tablet      4. Chronic tension-type headache, not intractable  G44.229 butalbital-acetaminophen-caffeine (FIORICET, ESGIC) -40 MG per tablet        Controlled Substance Monitoring:    Acute and Chronic Pain Monitoring:   RX Monitoring Periodic Controlled Substance Monitoring   10/3/2024  12:15 PM Possible medication side effects, risk of tolerance/dependence & alternative treatments discussed.;No signs of potential drug abuse or diversion identified.         Return in about 3 months (around 2025).    SUBJECTIVE/OBJECTIVE:  HPI  Insomnia  Klonopin is helping him go to sleep but he isn't staying asleep and is also taking melatonin.     Back pain  Having lower back pain. If he stands and works for an extended time. Then pain with hurt down his legs. Right now he is unable to stand for longer than 5 min with out his back hurting and having to sit down.     Headaches  These are much better on qulipta.   Needs a refill for esgic for as needed.     Review of Systems   Constitutional:  Positive for fatigue. Negative for appetite change and unexpected weight change.   HENT:  Negative for congestion, ear pain, rhinorrhea and sore throat.    Eyes:  Negative for discharge and redness.   Respiratory:  Negative for cough, choking and wheezing.    Cardiovascular:  Negative for chest pain.   Gastrointestinal:  Negative for blood in

## 2024-10-08 ENCOUNTER — HOSPITAL ENCOUNTER (OUTPATIENT)
Dept: MRI IMAGING | Age: 69
Discharge: HOME | End: 2024-10-08
Payer: MEDICARE

## 2024-10-08 ENCOUNTER — TELEPHONE (OUTPATIENT)
Dept: FAMILY MEDICINE CLINIC | Age: 69
End: 2024-10-08

## 2024-10-08 DIAGNOSIS — M54.42 CHRONIC BILATERAL LOW BACK PAIN WITH BILATERAL SCIATICA: ICD-10-CM

## 2024-10-08 DIAGNOSIS — G89.29 CHRONIC BILATERAL LOW BACK PAIN WITH BILATERAL SCIATICA: ICD-10-CM

## 2024-10-08 DIAGNOSIS — M54.50 CHRONIC MIDLINE LOW BACK PAIN WITHOUT SCIATICA: Primary | ICD-10-CM

## 2024-10-08 DIAGNOSIS — R93.89 ABNORMAL MRI: ICD-10-CM

## 2024-10-08 DIAGNOSIS — M54.41 CHRONIC BILATERAL LOW BACK PAIN WITH BILATERAL SCIATICA: ICD-10-CM

## 2024-10-08 DIAGNOSIS — G89.29 CHRONIC MIDLINE LOW BACK PAIN WITHOUT SCIATICA: Primary | ICD-10-CM

## 2024-10-08 PROCEDURE — 72148 MRI LUMBAR SPINE W/O DYE: CPT

## 2024-10-08 NOTE — TELEPHONE ENCOUNTER
Called patient, spoke with: Child/Children regarding the results of the patients most recent MRI.  I advised Child/Children of Chio Deras recommendations.   Child/Children did voice understanding

## 2024-10-08 NOTE — TELEPHONE ENCOUNTER
----- Message from CAPO Agee sent at 10/8/2024 12:54 PM CDT -----  There is arthritis seen on multilevels of the lower spine.   There may appear to be a disc extrusion at L4-5.   Please put in referral to dr. Chris bowen

## 2024-10-09 ENCOUNTER — TELEPHONE (OUTPATIENT)
Dept: NEUROSURGERY | Age: 69
End: 2024-10-09

## 2024-10-09 NOTE — TELEPHONE ENCOUNTER
1st attempt to contact patient in regards to new patient referral. No answer, left VM stating to call the office back at [phone].    DX:   M54.50, G89.29 (ICD-10-CM) - Chronic midline low back pain without sciatica   R93.89 (ICD-10-CM) - Abnormal MRI     -MRI LUMBAR SPINE (10/8/2024)

## 2024-10-25 DIAGNOSIS — K21.9 GASTROESOPHAGEAL REFLUX DISEASE WITHOUT ESOPHAGITIS: ICD-10-CM

## 2024-10-25 DIAGNOSIS — G43.709 CHRONIC MIGRAINE WITHOUT AURA WITHOUT STATUS MIGRAINOSUS, NOT INTRACTABLE: ICD-10-CM

## 2024-10-25 RX ORDER — ESOMEPRAZOLE MAGNESIUM 40 MG/1
40 CAPSULE, DELAYED RELEASE ORAL DAILY
Qty: 90 CAPSULE | Refills: 3 | Status: SHIPPED | OUTPATIENT
Start: 2024-10-25

## 2024-10-25 RX ORDER — ATOGEPANT 60 MG/1
1 TABLET ORAL DAILY
Qty: 90 TABLET | Refills: 3 | Status: SHIPPED | OUTPATIENT
Start: 2024-10-25

## 2024-10-25 NOTE — TELEPHONE ENCOUNTER
Received fax from pharmacy requesting refill on pts medication(s). Pt was last seen in office on 10/1/2024  and has a follow up scheduled for 1/2/2025. Will send request to  Chio Deras  for authorization.     Requested Prescriptions     Pending Prescriptions Disp Refills    QULIPTA 60 MG TABS [Pharmacy Med Name: Qulipta 60 MG Oral Tablet] 90 tablet 3     Sig: Take 1 tablet by mouth once daily

## 2024-10-25 NOTE — TELEPHONE ENCOUNTER
Received fax from pharmacy requesting refill on pts medication(s). Pt was last seen in office on 10/1/2024  and has a follow up scheduled for 1/2/2025. Will send request to  Chio Deras  for authorization.     Requested Prescriptions     Pending Prescriptions Disp Refills    esomeprazole (NEXIUM) 40 MG delayed release capsule [Pharmacy Med Name: Esomeprazole Magnesium 40 MG Oral Capsule Delayed Release] 90 capsule 3     Sig: Take 1 capsule by mouth once daily

## 2024-11-21 ENCOUNTER — OFFICE VISIT (OUTPATIENT)
Dept: NEUROSURGERY | Age: 69
End: 2024-11-21
Payer: MEDICARE

## 2024-11-21 VITALS
SYSTOLIC BLOOD PRESSURE: 122 MMHG | BODY MASS INDEX: 25.05 KG/M2 | WEIGHT: 175 LBS | HEIGHT: 70 IN | DIASTOLIC BLOOD PRESSURE: 79 MMHG | HEART RATE: 83 BPM

## 2024-11-21 DIAGNOSIS — G89.29 CHRONIC MIDLINE LOW BACK PAIN WITHOUT SCIATICA: ICD-10-CM

## 2024-11-21 DIAGNOSIS — M48.061 LUMBAR FORAMINAL STENOSIS: ICD-10-CM

## 2024-11-21 DIAGNOSIS — M43.16 SPONDYLOLISTHESIS AT L4-L5 LEVEL: ICD-10-CM

## 2024-11-21 DIAGNOSIS — M43.16 SPONDYLOLISTHESIS AT L2-L3 LEVEL: Primary | ICD-10-CM

## 2024-11-21 DIAGNOSIS — M43.17 SPONDYLOLISTHESIS AT L5-S1 LEVEL: ICD-10-CM

## 2024-11-21 DIAGNOSIS — M54.50 CHRONIC MIDLINE LOW BACK PAIN WITHOUT SCIATICA: ICD-10-CM

## 2024-11-21 PROCEDURE — 3074F SYST BP LT 130 MM HG: CPT | Performed by: NEUROLOGICAL SURGERY

## 2024-11-21 PROCEDURE — 3078F DIAST BP <80 MM HG: CPT | Performed by: NEUROLOGICAL SURGERY

## 2024-11-21 PROCEDURE — 1159F MED LIST DOCD IN RCRD: CPT | Performed by: NEUROLOGICAL SURGERY

## 2024-11-21 PROCEDURE — 99204 OFFICE O/P NEW MOD 45 MIN: CPT | Performed by: NEUROLOGICAL SURGERY

## 2024-11-21 PROCEDURE — 1123F ACP DISCUSS/DSCN MKR DOCD: CPT | Performed by: NEUROLOGICAL SURGERY

## 2024-11-21 ASSESSMENT — ENCOUNTER SYMPTOMS
GASTROINTESTINAL NEGATIVE: 1
BACK PAIN: 1
RESPIRATORY NEGATIVE: 1
EYES NEGATIVE: 1

## 2024-11-21 NOTE — PROGRESS NOTES
Review of Systems   Constitutional: Negative.    HENT: Negative.     Eyes: Negative.    Respiratory: Negative.     Cardiovascular: Negative.    Gastrointestinal: Negative.    Genitourinary: Negative.    Musculoskeletal:  Positive for back pain, joint pain and myalgias.   Skin: Negative.    Neurological: Negative.    Endo/Heme/Allergies: Negative.    Psychiatric/Behavioral: Negative.

## 2024-11-21 NOTE — PROGRESS NOTES
[facility]  Office Visit      Chief Complaint   Patient presents with    New Patient    Results     [facility] MRI lumbar    Back Pain     Patient states that he has constant low back pain especially when he is standing. Patient states that he does get some relief from sitting. Patient denies any radiating pain. Patient states he doesn't have any weakness, tingling or numbness. Patient has received hydrocodone and flexeril from his PCP but is not taking the medication anymore \"because it doesn't work\"  Patient notes difficulty walking.  Patient has not tried PT or pain management.        HISTORY OF PRESENT ILLNESS:    Sha Pierre is a 69 y.o. male with CAD, COPD, history of MI s/p CABG, RIGHT upper lobectomy for removal of cancer who presents with low back pain.  Pain is chronic and worsening.  The pain does not radiate. His pain is mostly located in the mid low back.  The patient denies numbness. No real pain with sitting.      His pain is worsened when going from a seated to standing position. His pain is worsened with walking. His pain is improved when lying flat. Overall, indicative that the patient does have a mechanical nature to their pain.       The patient has underwent a non-operative treatment course that has included:  Muscle Relaxers - flexeril  Opiates - short dose of Hydrocodone   Oral Steroids - prednisone         Of note he does not use tobacco and does take blood thinning medications (Eliquis and ASA).               Past Medical History:   Diagnosis Date    Acute ST elevation myocardial infarction (STEMI) of anteroseptal wall ([facility])     Arthritis     hands    CAD (coronary artery disease)     Cancer (HCC)     Septum     CHF (congestive heart failure) ([facility])     sees dr. haney    COPD (chronic obstructive pulmonary disease) ([facility])     Headache     Heart attack ([facility])     Hypertension     Lung nodule 12/2021    right upper lobe    MI (myocardial infarction) ([facility])        Past Surgical History:

## 2024-12-24 DIAGNOSIS — K59.04 CHRONIC IDIOPATHIC CONSTIPATION: ICD-10-CM

## 2024-12-26 RX ORDER — LINACLOTIDE 290 UG/1
290 CAPSULE, GELATIN COATED ORAL
Qty: 90 CAPSULE | Refills: 3 | Status: SHIPPED | OUTPATIENT
Start: 2024-12-26

## 2024-12-26 NOTE — TELEPHONE ENCOUNTER
Received fax from pharmacy requesting refill on pts medication(s). Pt was last seen in office on 10/1/2024  and has a follow up scheduled for Visit date not found. Will send request to  Chio Deras  for authorization.     Requested Prescriptions     Pending Prescriptions Disp Refills    linaclotide (LINZESS) 290 MCG CAPS capsule [Pharmacy Med Name: Linzess 290 MCG Oral Capsule] 90 capsule 3     Sig: Take 1 capsule by mouth every morning (before breakfast)

## 2025-01-20 DIAGNOSIS — T14.8XXA MUSCLE STRAIN: ICD-10-CM

## 2025-01-20 RX ORDER — CYCLOBENZAPRINE HCL 10 MG
10 TABLET ORAL 3 TIMES DAILY PRN
Qty: 90 TABLET | Refills: 0 | Status: SHIPPED | OUTPATIENT
Start: 2025-01-20 | End: 2025-01-21 | Stop reason: SDUPTHER

## 2025-01-20 NOTE — TELEPHONE ENCOUNTER
Sha Pierre called to request a refill on his medication.      Last office visit : 10/1/2024   Next office visit : Visit date not found     Requested Prescriptions     Pending Prescriptions Disp Refills    cyclobenzaprine (FLEXERIL) 10 MG tablet [Pharmacy Med Name: Cyclobenzaprine HCl 10 MG Oral Tablet] 90 tablet 0     Sig: Take 1 tablet by mouth three times daily as needed for muscle spasm            Sophia Baker MA

## 2025-01-21 DIAGNOSIS — T14.8XXA MUSCLE STRAIN: ICD-10-CM

## 2025-01-21 RX ORDER — CYCLOBENZAPRINE HCL 10 MG
10 TABLET ORAL 3 TIMES DAILY PRN
Qty: 90 TABLET | Refills: 0 | Status: SHIPPED | OUTPATIENT
Start: 2025-01-21

## 2025-01-21 NOTE — TELEPHONE ENCOUNTER
Pt daughter called requesting his muscle relaxer be refilled.     Requested Prescriptions     Pending Prescriptions Disp Refills    cyclobenzaprine (FLEXERIL) 10 MG tablet 90 tablet 0     Sig: Take 1 tablet by mouth 3 times daily as needed for Muscle spasms     Pt has appointment scheduled with Chio on 01/27/2025

## 2025-01-27 ENCOUNTER — OFFICE VISIT (OUTPATIENT)
Age: 70
End: 2025-01-27
Payer: MEDICARE

## 2025-01-27 VITALS
HEIGHT: 70 IN | HEART RATE: 76 BPM | OXYGEN SATURATION: 96 % | DIASTOLIC BLOOD PRESSURE: 82 MMHG | TEMPERATURE: 96.9 F | SYSTOLIC BLOOD PRESSURE: 126 MMHG | WEIGHT: 176 LBS | BODY MASS INDEX: 25.2 KG/M2

## 2025-01-27 DIAGNOSIS — F51.01 PRIMARY INSOMNIA: ICD-10-CM

## 2025-01-27 DIAGNOSIS — F41.1 GAD (GENERALIZED ANXIETY DISORDER): ICD-10-CM

## 2025-01-27 DIAGNOSIS — Z00.00 MEDICARE ANNUAL WELLNESS VISIT, SUBSEQUENT: Primary | ICD-10-CM

## 2025-01-27 DIAGNOSIS — K04.7 DENTAL ABSCESS: ICD-10-CM

## 2025-01-27 PROCEDURE — 1160F RVW MEDS BY RX/DR IN RCRD: CPT | Performed by: NURSE PRACTITIONER

## 2025-01-27 PROCEDURE — G0439 PPPS, SUBSEQ VISIT: HCPCS | Performed by: NURSE PRACTITIONER

## 2025-01-27 PROCEDURE — 3079F DIAST BP 80-89 MM HG: CPT | Performed by: NURSE PRACTITIONER

## 2025-01-27 PROCEDURE — 99213 OFFICE O/P EST LOW 20 MIN: CPT | Performed by: NURSE PRACTITIONER

## 2025-01-27 PROCEDURE — 3074F SYST BP LT 130 MM HG: CPT | Performed by: NURSE PRACTITIONER

## 2025-01-27 PROCEDURE — 1159F MED LIST DOCD IN RCRD: CPT | Performed by: NURSE PRACTITIONER

## 2025-01-27 PROCEDURE — 1123F ACP DISCUSS/DSCN MKR DOCD: CPT | Performed by: NURSE PRACTITIONER

## 2025-01-27 RX ORDER — CLONAZEPAM 1 MG/1
1 TABLET ORAL 3 TIMES DAILY PRN
Qty: 90 TABLET | Refills: 0 | Status: SHIPPED | OUTPATIENT
Start: 2025-01-27 | End: 2025-02-26

## 2025-01-27 SDOH — ECONOMIC STABILITY: FOOD INSECURITY: WITHIN THE PAST 12 MONTHS, YOU WORRIED THAT YOUR FOOD WOULD RUN OUT BEFORE YOU GOT MONEY TO BUY MORE.: NEVER TRUE

## 2025-01-27 SDOH — ECONOMIC STABILITY: FOOD INSECURITY: WITHIN THE PAST 12 MONTHS, THE FOOD YOU BOUGHT JUST DIDN'T LAST AND YOU DIDN'T HAVE MONEY TO GET MORE.: NEVER TRUE

## 2025-01-27 ASSESSMENT — PATIENT HEALTH QUESTIONNAIRE - PHQ9
SUM OF ALL RESPONSES TO PHQ9 QUESTIONS 1 & 2: 0
SUM OF ALL RESPONSES TO PHQ QUESTIONS 1-9: 0
2. FEELING DOWN, DEPRESSED OR HOPELESS: NOT AT ALL
1. LITTLE INTEREST OR PLEASURE IN DOING THINGS: NOT AT ALL
SUM OF ALL RESPONSES TO PHQ QUESTIONS 1-9: 0

## 2025-01-27 NOTE — PROGRESS NOTES
Medicare Annual Wellness Visit    Sha Pierre is here for Medicare AWV (Pts is here for annual well visit )    Assessment & Plan   Medicare annual wellness visit, subsequent  Primary insomnia  -     clonazePAM (KLONOPIN) 1 MG tablet; Take 1 tablet by mouth 3 times daily as needed for Anxiety for up to 30 days., Disp-90 tablet, R-0Normal  ABHINAV (generalized anxiety disorder)  -     clonazePAM (KLONOPIN) 1 MG tablet; Take 1 tablet by mouth 3 times daily as needed for Anxiety for up to 30 days., Disp-90 tablet, R-0Normal  Dental abscess  -     amoxicillin-clavulanate (AUGMENTIN) 875-125 MG per tablet; Take 1 tablet by mouth 2 times daily for 10 days, Disp-20 tablet, R-0Normal       Return in about 3 months (around 4/27/2025).     Subjective   The following acute and/or chronic problems were also addressed today:  Insomnia  Klonopin is helping him go to sleep but he isn't staying asleep and is also taking melatonin.     Dental abscess  Has several teeth that are broken off.     Patient's complete Health Risk Assessment and screening values have been reviewed and are found in Flowsheets. The following problems were reviewed today and where indicated follow up appointments were made and/or referrals ordered.    Positive Risk Factor Screenings with Interventions:                 Dentist Screen:  Have you seen the dentist within the past year?: (!) No    Intervention:  Advised to schedule with their dentist        Advanced Directives:  Do you have a Living Will?: (!) No    Intervention:  has NO advanced directive - information provided         CV Risk Counseling:  Patient was asked about his current diet and exercise habits, and personalized advice was provided regarding recommended lifestyle changes. Patient's individual cardiovascular disease risk factors, including advanced age (> 55 for men, > 65 for women), dyslipidemia, hypertension, male gender, and smoking/tobacco exposure, were discussed, as well as the likely

## 2025-01-27 NOTE — PATIENT INSTRUCTIONS

## 2025-02-17 RX ORDER — METOPROLOL SUCCINATE 25 MG/1
25 TABLET, EXTENDED RELEASE ORAL NIGHTLY
Qty: 90 TABLET | Refills: 0 | Status: SHIPPED | OUTPATIENT
Start: 2025-02-17

## 2025-03-17 RX ORDER — APIXABAN 5 MG/1
5 TABLET, FILM COATED ORAL 2 TIMES DAILY
Qty: 60 TABLET | Refills: 1 | Status: SHIPPED | OUTPATIENT
Start: 2025-03-17

## 2025-03-18 RX ORDER — ATORVASTATIN CALCIUM 40 MG/1
40 TABLET, FILM COATED ORAL NIGHTLY
Qty: 90 TABLET | Refills: 0 | Status: SHIPPED | OUTPATIENT
Start: 2025-03-18

## 2025-03-19 RX ORDER — ATORVASTATIN CALCIUM 40 MG/1
40 TABLET, FILM COATED ORAL NIGHTLY
Qty: 90 TABLET | Refills: 0 | OUTPATIENT
Start: 2025-03-19

## 2025-04-15 ENCOUNTER — TELEPHONE (OUTPATIENT)
Dept: CARDIOLOGY CLINIC | Age: 70
End: 2025-04-15

## 2025-04-15 ENCOUNTER — TELEPHONE (OUTPATIENT)
Age: 70
End: 2025-04-15

## 2025-04-15 RX ORDER — METOPROLOL SUCCINATE 25 MG/1
25 TABLET, EXTENDED RELEASE ORAL NIGHTLY
Qty: 90 TABLET | Refills: 0 | Status: SHIPPED | OUTPATIENT
Start: 2025-04-15

## 2025-04-15 RX ORDER — CLOPIDOGREL BISULFATE 75 MG/1
75 TABLET ORAL DAILY
Qty: 90 TABLET | Refills: 0 | Status: SHIPPED | OUTPATIENT
Start: 2025-04-15

## 2025-04-15 NOTE — TELEPHONE ENCOUNTER
Patient received letter from insurance cyclobenzaprine 10mg not going to pay for it unless PA is done.    I do see the PA in the chart was started. Will send to clinic.

## 2025-04-15 NOTE — TELEPHONE ENCOUNTER
Patient last seen 7.23.24, cancelled 1.23.25 appt. Refills requested today from pharmacy, can be refilled today but will need appt for further refills. Please contact patient to schedule.

## 2025-04-21 RX ORDER — CLOPIDOGREL BISULFATE 75 MG/1
75 TABLET ORAL DAILY
Qty: 90 TABLET | Refills: 0 | OUTPATIENT
Start: 2025-04-21

## 2025-05-01 ENCOUNTER — OFFICE VISIT (OUTPATIENT)
Age: 70
End: 2025-05-01
Payer: MEDICARE

## 2025-05-01 VITALS
TEMPERATURE: 97.5 F | HEART RATE: 58 BPM | BODY MASS INDEX: 24.68 KG/M2 | WEIGHT: 172 LBS | OXYGEN SATURATION: 96 % | DIASTOLIC BLOOD PRESSURE: 62 MMHG | SYSTOLIC BLOOD PRESSURE: 101 MMHG

## 2025-05-01 DIAGNOSIS — G89.29 CHRONIC MIDLINE LOW BACK PAIN WITHOUT SCIATICA: ICD-10-CM

## 2025-05-01 DIAGNOSIS — M54.50 CHRONIC MIDLINE LOW BACK PAIN WITHOUT SCIATICA: ICD-10-CM

## 2025-05-01 DIAGNOSIS — K59.04 CHRONIC IDIOPATHIC CONSTIPATION: ICD-10-CM

## 2025-05-01 DIAGNOSIS — F51.01 PRIMARY INSOMNIA: ICD-10-CM

## 2025-05-01 DIAGNOSIS — G44.229 CHRONIC TENSION-TYPE HEADACHE, NOT INTRACTABLE: ICD-10-CM

## 2025-05-01 DIAGNOSIS — I10 PRIMARY HYPERTENSION: ICD-10-CM

## 2025-05-01 DIAGNOSIS — F41.1 GAD (GENERALIZED ANXIETY DISORDER): Primary | ICD-10-CM

## 2025-05-01 DIAGNOSIS — Z53.20 COLONOSCOPY REFUSED: ICD-10-CM

## 2025-05-01 PROCEDURE — 3078F DIAST BP <80 MM HG: CPT | Performed by: NURSE PRACTITIONER

## 2025-05-01 PROCEDURE — 3074F SYST BP LT 130 MM HG: CPT | Performed by: NURSE PRACTITIONER

## 2025-05-01 PROCEDURE — 99214 OFFICE O/P EST MOD 30 MIN: CPT | Performed by: NURSE PRACTITIONER

## 2025-05-01 PROCEDURE — 1160F RVW MEDS BY RX/DR IN RCRD: CPT | Performed by: NURSE PRACTITIONER

## 2025-05-01 PROCEDURE — 1159F MED LIST DOCD IN RCRD: CPT | Performed by: NURSE PRACTITIONER

## 2025-05-01 PROCEDURE — 1123F ACP DISCUSS/DSCN MKR DOCD: CPT | Performed by: NURSE PRACTITIONER

## 2025-05-01 RX ORDER — CLONAZEPAM 1 MG/1
1 TABLET ORAL 3 TIMES DAILY PRN
Qty: 90 TABLET | Refills: 0 | Status: SHIPPED | OUTPATIENT
Start: 2025-05-01 | End: 2025-05-31

## 2025-05-01 NOTE — ASSESSMENT & PLAN NOTE
Orders:    clonazePAM (KLONOPIN) 1 MG tablet; Take 1 tablet by mouth 3 times daily as needed for Anxiety for up to 30 days.

## 2025-05-01 NOTE — PROGRESS NOTES
Sha Pierre (:  1955) is a 69 y.o. male, Established patient, here for evaluation of the following chief complaint(s):  3 Month Follow-Up (Pts is here for 3 month follow up. Has been having dizzy spells. )         Assessment & Plan  1. Dizziness: Hypotensive state may be contributing to dizziness upon standing.  - Blood pressure readings have been consistently low, with a recent measurement of 101/62.  - Consultation with cardiologist scheduled for next week to discuss potential reduction of losartan dosage to half a tablet.  - Monitoring of blood pressure at home is advised.    2. Headaches.  - Currently taking Qulipta for headaches.  - Continued monitoring of headache frequency and severity.    3. Constipation.  - Currently taking Linzess for constipation but only uses it when he feels the need to go.  - Recommended to take Linzess daily as prescribed to manage symptoms more effectively.  - Patient education provided on the importance of regular medication adherence.  - Monitoring of bowel movement regularity and effectiveness of Linzess.    4. Back pain: Reports worsening back pain.  - Advised to avoid heavy lifting and strenuous activities.  - Continued monitoring of pain levels and functional limitations.    Follow-up  - Consultation with cardiologist scheduled for next week.  Controlled Substance Monitoring:    Acute and Chronic Pain Monitoring:   RX Monitoring Periodic Controlled Substance Monitoring   2025   2:22 PM Possible medication side effects, risk of tolerance/dependence & alternative treatments discussed.;No signs of potential drug abuse or diversion identified.         Assessment & Plan  Primary insomnia   Chronic, at goal (stable), continue current treatment plan    Orders:    clonazePAM (KLONOPIN) 1 MG tablet; Take 1 tablet by mouth 3 times daily as needed for Anxiety for up to 30 days.    ABHINAV (generalized anxiety disorder)       Orders:    clonazePAM (KLONOPIN) 1 MG tablet; Take

## 2025-05-06 ENCOUNTER — OFFICE VISIT (OUTPATIENT)
Dept: CARDIOLOGY CLINIC | Age: 70
End: 2025-05-06
Payer: MEDICARE

## 2025-05-06 VITALS
HEART RATE: 99 BPM | WEIGHT: 173 LBS | BODY MASS INDEX: 24.77 KG/M2 | DIASTOLIC BLOOD PRESSURE: 80 MMHG | OXYGEN SATURATION: 97 % | HEIGHT: 70 IN | SYSTOLIC BLOOD PRESSURE: 120 MMHG

## 2025-05-06 DIAGNOSIS — I48.0 PAF (PAROXYSMAL ATRIAL FIBRILLATION) (HCC): ICD-10-CM

## 2025-05-06 DIAGNOSIS — I25.10 CORONARY ARTERY DISEASE INVOLVING NATIVE CORONARY ARTERY OF NATIVE HEART WITHOUT ANGINA PECTORIS: ICD-10-CM

## 2025-05-06 DIAGNOSIS — I10 ESSENTIAL HYPERTENSION: ICD-10-CM

## 2025-05-06 DIAGNOSIS — I50.22 CHRONIC SYSTOLIC HEART FAILURE (HCC): Primary | ICD-10-CM

## 2025-05-06 PROCEDURE — 3074F SYST BP LT 130 MM HG: CPT | Performed by: NURSE PRACTITIONER

## 2025-05-06 PROCEDURE — 3079F DIAST BP 80-89 MM HG: CPT | Performed by: NURSE PRACTITIONER

## 2025-05-06 PROCEDURE — 1123F ACP DISCUSS/DSCN MKR DOCD: CPT | Performed by: NURSE PRACTITIONER

## 2025-05-06 PROCEDURE — 99214 OFFICE O/P EST MOD 30 MIN: CPT | Performed by: NURSE PRACTITIONER

## 2025-05-06 PROCEDURE — 93000 ELECTROCARDIOGRAM COMPLETE: CPT | Performed by: NURSE PRACTITIONER

## 2025-05-06 PROCEDURE — 1159F MED LIST DOCD IN RCRD: CPT | Performed by: NURSE PRACTITIONER

## 2025-05-06 RX ORDER — ATORVASTATIN CALCIUM 40 MG/1
40 TABLET, FILM COATED ORAL NIGHTLY
Qty: 90 TABLET | Refills: 3 | Status: SHIPPED | OUTPATIENT
Start: 2025-05-06

## 2025-05-06 RX ORDER — SPIRONOLACTONE 25 MG/1
25 TABLET ORAL DAILY
Qty: 90 TABLET | Refills: 3 | Status: SHIPPED | OUTPATIENT
Start: 2025-05-06

## 2025-05-06 RX ORDER — LOSARTAN POTASSIUM 50 MG/1
25 TABLET ORAL DAILY
Qty: 90 TABLET | Refills: 3 | Status: SHIPPED | OUTPATIENT
Start: 2025-05-06

## 2025-05-06 RX ORDER — CLOPIDOGREL BISULFATE 75 MG/1
75 TABLET ORAL DAILY
Qty: 90 TABLET | Refills: 3 | Status: SHIPPED | OUTPATIENT
Start: 2025-05-06

## 2025-05-06 RX ORDER — METOPROLOL SUCCINATE 25 MG/1
25 TABLET, EXTENDED RELEASE ORAL NIGHTLY
Qty: 90 TABLET | Refills: 3 | Status: SHIPPED | OUTPATIENT
Start: 2025-05-06

## 2025-05-06 ASSESSMENT — ENCOUNTER SYMPTOMS
SHORTNESS OF BREATH: 0
WHEEZING: 0
COUGH: 0
CHEST TIGHTNESS: 0
SORE THROAT: 0

## 2025-05-06 NOTE — PROGRESS NOTES
[facility]   Established Patient Office Visit  [address]  [address]  [facility] [address]  [phone]        OFFICE VISIT:  2025    Sha Pierre - : 1955    Reason For Visit:  Sha is a 69 y.o. male who is here for Follow-up and Medication Refill    1. Chronic systolic heart failure (HCC)    2. Coronary artery disease involving native coronary artery of native heart without angina pectoris    3. PAF (paroxysmal atrial fibrillation) (HCC)    4. Essential hypertension        Patient with a history of coronary artery disease/CABG x 5, ischemic cardiomyopathy, chronic systolic congestive heart failure, hypertension, dyslipidemia, atrial fib/flutter, AV block, Mobitz 2 which resolved after stopping diltiazem and decreasing metoprolol succinate. Patient had been followed at [facility] cardiology.        2D echo 2022 EF 36 to 40%. 2D echo on 2023 revealed normal left ventricular systolic function with ejection fraction 61 to 65%, indeterminate left ventricular diastolic function, hypokinetic mid anterior, apical anterior, apical septal, mid anteroseptal and apex wall segments, mildly increased left atrial volume and no significant valvular heart disease. He has ischemic cardiomyopathy, chronic systolic congestive heart failure, coronary artery disease status post CABG x5 (LIMA to LAD, SVG to RCA, SVG to OM1 and sequential SVG to ramus intermedius and first diagonal) 10/22/2021 per Dr. Madhu Lay at [facility], persistent atrial fib/flutter on chronic anticoagulation, hypertension, hyperlipidemia and skin cancer.      Patient had been on Entresto but was unable to tolerate it due to fatigue.    Patient presents to clinic today for routine follow-up.  Patient accompanied by daughter.  Patient denies any chest pain, pressure or tightness.  There is no shortness of breath, orthopnea or PND.  Patient has had some lightheadedness upon standing.  Blood pressures have been

## 2025-07-12 DIAGNOSIS — T14.8XXA MUSCLE STRAIN: ICD-10-CM

## 2025-07-14 RX ORDER — CYCLOBENZAPRINE HCL 10 MG
10 TABLET ORAL 3 TIMES DAILY PRN
Qty: 90 TABLET | Refills: 3 | Status: SHIPPED | OUTPATIENT
Start: 2025-07-14

## 2025-07-14 NOTE — TELEPHONE ENCOUNTER
Received fax from pharmacy requesting refill on pts medication(s). Pt was last seen in office on 5/1/2025  and has a follow up scheduled for 8/1/2025. Will send request to  Chio Deras  for patient.     Requested Prescriptions     Pending Prescriptions Disp Refills    cyclobenzaprine (FLEXERIL) 10 MG tablet [Pharmacy Med Name: Cyclobenzaprine HCl 10 MG Oral Tablet] 90 tablet 3     Sig: Take 1 tablet by mouth 3 times daily as needed for Muscle spasms

## 2025-08-01 ENCOUNTER — OFFICE VISIT (OUTPATIENT)
Age: 70
End: 2025-08-01
Payer: MEDICARE

## 2025-08-01 VITALS
SYSTOLIC BLOOD PRESSURE: 105 MMHG | WEIGHT: 173 LBS | HEIGHT: 70 IN | OXYGEN SATURATION: 96 % | HEART RATE: 88 BPM | DIASTOLIC BLOOD PRESSURE: 64 MMHG | BODY MASS INDEX: 24.77 KG/M2 | TEMPERATURE: 97.2 F

## 2025-08-01 DIAGNOSIS — F51.01 PRIMARY INSOMNIA: ICD-10-CM

## 2025-08-01 DIAGNOSIS — F41.1 GAD (GENERALIZED ANXIETY DISORDER): ICD-10-CM

## 2025-08-01 PROCEDURE — 1123F ACP DISCUSS/DSCN MKR DOCD: CPT | Performed by: NURSE PRACTITIONER

## 2025-08-01 PROCEDURE — 1160F RVW MEDS BY RX/DR IN RCRD: CPT | Performed by: NURSE PRACTITIONER

## 2025-08-01 PROCEDURE — 3078F DIAST BP <80 MM HG: CPT | Performed by: NURSE PRACTITIONER

## 2025-08-01 PROCEDURE — 99213 OFFICE O/P EST LOW 20 MIN: CPT | Performed by: NURSE PRACTITIONER

## 2025-08-01 PROCEDURE — 3074F SYST BP LT 130 MM HG: CPT | Performed by: NURSE PRACTITIONER

## 2025-08-01 PROCEDURE — 1159F MED LIST DOCD IN RCRD: CPT | Performed by: NURSE PRACTITIONER

## 2025-08-01 RX ORDER — CLONAZEPAM 1 MG/1
1 TABLET ORAL 3 TIMES DAILY PRN
Qty: 90 TABLET | Refills: 0 | Status: SHIPPED | OUTPATIENT
Start: 2025-08-01 | End: 2025-08-31

## 2025-08-01 ASSESSMENT — ENCOUNTER SYMPTOMS
EYE REDNESS: 0
RHINORRHEA: 0
EYE DISCHARGE: 0
COUGH: 0
SORE THROAT: 0
DIARRHEA: 0
WHEEZING: 0
BLOOD IN STOOL: 0
BACK PAIN: 1
CONSTIPATION: 0
CHOKING: 0

## 2025-08-01 NOTE — PROGRESS NOTES
Sha Pierre (:  1955) is a 70 y.o. male, Established patient, here for evaluation of the following chief complaint(s):  3 Month Follow-Up (Pts is here for 3 months follow up. States he has been having more dizzy spells. States he seen cardiology, states EKG showed he was in and out of AFIB. Cardiology said they were not concerned at this time. )         Assessment & Plan      Assessment & Plan  Primary insomnia       Orders:    clonazePAM (KLONOPIN) 1 MG tablet; Take 1 tablet by mouth 3 times daily as needed for Anxiety for up to 30 days.    ABHINAV (generalized anxiety disorder)       Orders:    clonazePAM (KLONOPIN) 1 MG tablet; Take 1 tablet by mouth 3 times daily as needed for Anxiety for up to 30 days.      Results    No results found for this visit on 25.         Return in about 3 months (around 2025).       Subjective   History of Present Illness  Insomnia  He is sleeping ok with klonopin.   Sometimes takes melatonin with it.       Prior to Visit Medications    Medication Sig Taking? Authorizing Provider   clonazePAM (KLONOPIN) 1 MG tablet Take 1 tablet by mouth 3 times daily as needed for Anxiety for up to 30 days. Yes Chio Deras APRN   cyclobenzaprine (FLEXERIL) 10 MG tablet Take 1 tablet by mouth 3 times daily as needed for Muscle spasms Yes Chio Deras APRN   losartan (COZAAR) 50 MG tablet Take 0.5 tablets by mouth daily Yes Sara Subramanian APRN - NP   atorvastatin (LIPITOR) 40 MG tablet Take 1 tablet by mouth nightly Yes Sara Subramanian APRN - NP   clopidogrel (PLAVIX) 75 MG tablet Take 1 tablet by mouth daily Yes Sara Subramanian APRN - NP   apixaban (ELIQUIS) 5 MG TABS tablet Take 1 tablet by mouth 2 times daily Yes Sara Subramanian APRN - NP   metoprolol succinate (TOPROL XL) 25 MG extended release tablet Take 1 tablet by mouth at bedtime Yes Sara Subramanian APRN - NP   spironolactone (ALDACTONE) 25 MG tablet Take 1 tablet by mouth daily Yes Sara Subramanian APRN - NP

## 2025-08-08 DIAGNOSIS — K04.7 ABSCESSED TOOTH: Primary | ICD-10-CM

## 2025-08-08 RX ORDER — AMOXICILLIN 500 MG/1
500 CAPSULE ORAL 3 TIMES DAILY
Qty: 30 CAPSULE | Refills: 0 | Status: SHIPPED | OUTPATIENT
Start: 2025-08-08 | End: 2025-08-18

## (undated) DEVICE — SYSTEM SKIN CLSR 22CM DERMBND PRINEO

## (undated) DEVICE — PACK,UNIVERSAL,NO GOWNS: Brand: MEDLINE

## (undated) DEVICE — DRAIN SURG L3/8-1/2IN DIA3/16IN SIL CARD CONN 1:1 BLAK

## (undated) DEVICE — TOWEL,OR,DSP,ST,BLUE,DLX,4/PK,20PK/CS: Brand: MEDLINE

## (undated) DEVICE — SUTURE PERMA-HAND 1 L30IN NONABSORBABLE BLK SILK BRAID W/O SA87G

## (undated) DEVICE — COVER,MAYO STAND,STERILE: Brand: MEDLINE

## (undated) DEVICE — SUREFORM 45 CURVED-TIP: Brand: SUREFORM

## (undated) DEVICE — TUBE ET 39FR DIA5.3MM L POLYUR TRACH CUF SNAP LOK CONN DISP

## (undated) DEVICE — PAD,ARMBOARD,CONV,FOAM,2X8X20",12PR/CS: Brand: MEDLINE

## (undated) DEVICE — CONNECTOR DRNGE W3/8-0.5XH3/16XL3/16IN 2:1 SIL CARD STR

## (undated) DEVICE — GLOVE SURG SZ 75 L12IN FNGR THK79MIL GRN LTX FREE

## (undated) DEVICE — SUTURE MCRYL SZ 4-0 L18IN ABSRB UD L19MM PS-2 3/8 CIR PRIM Y496G

## (undated) DEVICE — PLEDGET SURG W0.375XL0.062IN THK1.5MM WHT SFT PTFE RECT

## (undated) DEVICE — SUTURE PERMAHAND SZ 2-0 L30IN NONABSORBABLE BLK SH L26MM C016D

## (undated) DEVICE — JP CHANNEL DRAIN, 24FR HUBLESS: Brand: CARDINAL HEALTH

## (undated) DEVICE — EXCEL 10FT (3.05 M) INSUFFLATION TUBING SET WITH 0.1 MICRON FILTER: Brand: EXCEL

## (undated) DEVICE — DRAIN SURG SGL COLL PT TB FOR ATS BG OASIS

## (undated) DEVICE — SUREFORM 45 RELOAD GREEN: Brand: SUREFORM

## (undated) DEVICE — NEEDLE SPNL 18GA L3.5IN W/ QNCKE SHARPER BVL DURA CLICK

## (undated) DEVICE — SEALANT TISS 10 CC FIBRIN VISTASEAL

## (undated) DEVICE — SHEET,DRAPE,53X77,STERILE: Brand: MEDLINE

## (undated) DEVICE — GLOVE SURG SZ 75 L12IN FNGR THK94MIL TRNSLUC YEL LTX

## (undated) DEVICE — GLOVE SURG SZ 6 L12IN FNGR THK79MIL GRN LTX FREE

## (undated) DEVICE — COVER LT HNDL BLU PLAS

## (undated) DEVICE — AGENT HEMSTAT W2XL4IN OXIDIZED REGENERATED CELOS ABSRB

## (undated) DEVICE — ADHESIVE SKIN CLSR 0.7ML TOP DERMBND ADV

## (undated) DEVICE — GLOVE SURG SZ 65 CRM LTX FREE POLYISOPRENE POLYMER BEAD ANTI

## (undated) DEVICE — DRAPE,UTILITY,XL,4/PK,STERILE: Brand: MEDLINE

## (undated) DEVICE — SUTURE VCRL SZ 0 L36IN ABSRB UD L36MM CT-1 1/2 CIR J946H

## (undated) DEVICE — GLOVE SURG SZ 75 CRM LTX FREE POLYISOPRENE POLYMER BEAD ANTI

## (undated) DEVICE — DRESSING HEMSTAT W1X2IN ABSRB SURGCEL SNOW

## (undated) DEVICE — TROCARS: Brand: KII® OPTICAL ACCESS SYSTEM

## (undated) DEVICE — SUTURE VCRL SZ 2-0 L27IN ABSRB UD L26MM SH 1/2 CIR J417H

## (undated) DEVICE — ARM DRAPE

## (undated) DEVICE — CANNULA SEAL

## (undated) DEVICE — LOOP VES W25MM THK1MM MAXI RED SIL FLD REPELLENT 100 PER

## (undated) DEVICE — SOLUTION IRRIG 1000ML STRL H2O USP PLAS POUR BTL

## (undated) DEVICE — SOLUTION ANTIFOG VIS SYS CLEARIFY LAPSCP

## (undated) DEVICE — REDUCER: Brand: ENDOWRIST

## (undated) DEVICE — POUCH SPEC RETRV SHFT 15MM 13X23CM GRN POLYUR DBL RNG HNDL

## (undated) DEVICE — GLIDESCOPE BFLEX 3.8 BRONCHOSCOPE

## (undated) DEVICE — KIT,ANTI FOG,W/SPONGE & FLUID,SOFT PACK: Brand: MEDLINE

## (undated) DEVICE — GLOVE SURG SZ 65 L12IN FNGR THK79MIL GRN LTX FREE

## (undated) DEVICE — TIP COVER ACCESSORY

## (undated) DEVICE — SUTURE PROL SZ 4-0 L36IN NONABSORBABLE BLU L26MM SH 1/2 CIR 8521H

## (undated) DEVICE — STAPLER INT L340MM 45MM STD 12 FIRING B FRM PWR + GRIPPING

## (undated) DEVICE — 3M™ IOBAN™ 2 ANTIMICROBIAL INCISE DRAPE 6650EZ: Brand: IOBAN™ 2

## (undated) DEVICE — LOOP VES W1.3MM THK0.9MM MINI WHT SIL FLD REPELLENT

## (undated) DEVICE — COLUMN DRAPE

## (undated) DEVICE — BANDAGE GZ W2XL75IN ST RAYON POLY CNFRM STRTCH LTWT

## (undated) DEVICE — PUMP SUC IRR TBNG L10FT W/ HNDPC ASSEMB STRYKEFLOW 2

## (undated) DEVICE — CLIP INT L POLYMER LOK LIG HEM O LOK

## (undated) DEVICE — AIRSEAL 12 MM ACCESS PORT AND PALM GRIP OBTURATOR WITH BLADELESS OPTICAL TIP, 120 MM LENGTH: Brand: AIRSEAL

## (undated) DEVICE — SEAL

## (undated) DEVICE — BLADE LARYNSCP SZ 4 TI DISP SPECTRM DVM

## (undated) DEVICE — GOWN,PREVENTION PLUS,XL,ST,24/CS: Brand: MEDLINE

## (undated) DEVICE — GENERAL LAP CDS

## (undated) DEVICE — GLOVE SURG SZ 6 L12IN FNGR THK126MIL CRM LTX FREE

## (undated) DEVICE — RELOAD STPL L45MM H2-4.1MM THCK TISS GRN GRIPPING SURF B

## (undated) DEVICE — DRESSING TRNSPAR W4XL10IN FLM MIC POR SURESITE 123

## (undated) DEVICE — TOTAL TRAY, 16FR 10ML SIL FOLEY, URN: Brand: MEDLINE

## (undated) DEVICE — GAUZE,SPONGE,4"X4",8PLY,STRL,LF,10/TRAY: Brand: MEDLINE

## (undated) DEVICE — APPLICATOR LAP 35 CM 2 RIGID VISTASEAL

## (undated) DEVICE — Z INACTIVE USE 2735373 APPLICATOR FBR LAIN COT WOOD TIP ECONOMICAL

## (undated) DEVICE — SUREFORM 45 RELOAD WHITE: Brand: SUREFORM

## (undated) DEVICE — SUTURE PERMAHAND SZ 2-0 L18IN NONABSORBABLE BLK L26MM FS 685G